# Patient Record
Sex: FEMALE | Race: BLACK OR AFRICAN AMERICAN | Employment: OTHER | URBAN - METROPOLITAN AREA
[De-identification: names, ages, dates, MRNs, and addresses within clinical notes are randomized per-mention and may not be internally consistent; named-entity substitution may affect disease eponyms.]

---

## 2017-03-21 ENCOUNTER — HOSPITAL ENCOUNTER (OUTPATIENT)
Dept: CT IMAGING | Age: 72
Discharge: HOME | End: 2017-03-21
Attending: INTERNAL MEDICINE
Payer: MEDICARE

## 2017-03-21 DIAGNOSIS — Z12.11 ENCOUNTER FOR SCREENING FOR MALIGNANT NEOPLASM OF COLON: ICD-10-CM

## 2017-03-21 PROCEDURE — 74261 CT COLONOGRAPHY DX: CPT

## 2017-04-10 ENCOUNTER — HOSPITAL ENCOUNTER (OUTPATIENT)
Dept: MAMMOGRAPHY | Age: 72
Discharge: HOME | End: 2017-04-10
Attending: NURSE PRACTITIONER
Payer: MEDICARE

## 2017-04-10 DIAGNOSIS — Z78.0 POSTMENOPAUSAL: ICD-10-CM

## 2017-04-10 PROCEDURE — 77080 DXA BONE DENSITY AXIAL: CPT

## 2017-12-08 ENCOUNTER — HOSPITAL ENCOUNTER (OUTPATIENT)
Dept: PHYSICAL THERAPY | Age: 72
Discharge: HOME | End: 2017-12-08
Attending: NURSE PRACTITIONER
Payer: MEDICARE

## 2017-12-08 DIAGNOSIS — R42 VERTIGO: ICD-10-CM

## 2017-12-08 PROCEDURE — G8979 MOBILITY GOAL STATUS: HCPCS

## 2017-12-08 PROCEDURE — 97163 PT EVAL HIGH COMPLEX 45 MIN: CPT

## 2017-12-08 PROCEDURE — G8978 MOBILITY CURRENT STATUS: HCPCS

## 2017-12-08 NOTE — PROGRESS NOTES
Ambulatory/Rehab Services H2 Model Falls Risk Assessment    Risk Factor Pts. ·   Confusion/Disorientation/Impulsivity  []    4 ·   Symptomatic Depression  []   2 ·   Altered Elimination  []   1 ·   Dizziness/Vertigo  [x]   1 ·   Gender (Male)  []   1 ·   Any administered antiepileptics (anticonvulsants):  []   2 ·   Any administered benzodiazepines:  []   1 ·   Visual Impairment (specify):  []   1 ·   Portable Oxygen Use  []   1 ·   Orthostatic ? BP  []   1 ·   History of Recent Falls (within 3 mos.)  []   5     Ability to Rise from Chair (choose one) Pts. ·   Ability to rise in a single movement  [x]   0 ·   Pushes up, successful in one attempt  []   1 ·   Multiple attempts, but successful  []   3 ·   Unable to rise without assistance  []   4   Total: (5 or greater = High Risk) 1     Falls Prevention Plan:   []                Physical Limitations to Exercise (specify):   []                Mobility Assistance Device (type):   []                Exercise/Equipment Adaptation (specify):    ©2010 [facility] [address] Patent #[id number].  Federal Law prohibits the replication, distribution or use without written permission from [facility] [employer]

## 2017-12-08 NOTE — THERAPY EVALUATION
Hien Lowe  : 1945  Primary: [place]  Secondary:  Therapy Center at [facility], [address] [address],[address], [address] [facility]  [phone]   [phone]        OUTPATIENT PHYSICAL THERAPY:Initial Assessment 2017    ICD-10: Treatment Diagnosis: Difficulty in walking, not elsewhere classified (R26.2)  Precautions/Allergies:   Sulfa (sulfonamide antibiotics)   Fall Risk Score: 1 (? 5 = High Risk)  MD Orders: Evaluate and treat MEDICAL/REFERRING DIAGNOSIS:  Vertigo [R42]   DATE OF ONSET: Around two months   REFERRING PHYSICIAN: Maryann Ball NP  RETURN PHYSICIAN APPOINTMENT: unknown      INITIAL ASSESSMENT:  Ms. Hans Hoang presents with dizziness, imbalance, and difficulty walking due to peripheral weakness. Patient is at higher fall risk based on score received on Dynamic Gait Index and multiple falls on SMART Equitest Sensory Organization test.  Patient would benefit from skilled physical therapy to address problems and goals. Thank you for this referral.     PROBLEM LIST (Impacting functional limitations):  1. Decreased Ambulation Ability/Technique  2. Decreased Balance  3. Decreased [place] with Home Exercise Program  4. Increased dizziness INTERVENTIONS PLANNED:  1. Balance Exercise  2. Gait Training  3. Home Exercise Program (HEP)  4. Habituation exercises   TREATMENT PLAN:  Effective Dates: 2017 TO 3/2/2018. Frequency/Duration: 1-2 times a week for 12 weeks  GOALS: (Goals have been discussed and agreed upon with patient.)  Short-Term Functional Goals: Time Frame: 2 weeks   1. Patient will be independent with home exercise program to improve balance. Discharge Goals: Time Frame: 12 weeks   1. Patient will increase score on Dynamic Gait Index to greater than or equal to 22/24 demonstrating improved balance and decreased fall risk with daily activities.    2. Patient will be able to maintain her balance two out of three times on conditions 5 and 6 of SMART Equitest Sensory Organization test indicating improved vestibular function. 3. Patient will report less dizziness with daily activities indicating improvement. Rehabilitation Potential For Stated Goals: Excellent  Regarding Kaylin Lowe's therapy, I certify that the treatment plan above will be carried out by a therapist or under their direction. Thank you for this referral,  Gerda Daniel PT     Referring Physician Signature: Maryann Ball NP              Date                    The information in this section was collected on 12/8/2017 (except where otherwise noted). HISTORY:   History of Present Injury/Illness (Reason for Referral):  Patient reports dizziness began around two months ago. Dizziness comes and goes. Aggravating factor are laying down and sitting up. Patient describes it as room spinning lasting a few seconds. Patient rates current dizziness as 0/10 and current pain level as 0/10. Patient uses no assistive device and has had no falls. Patient denies tinnitus. Past Medical History/Comorbidities:   Ms. Hans Hoang  has a past medical history of Arthritis; Glaucoma; Sarcoidosis; and Sarcoidosis of lung ([address] She also has no past medical history of Aneurysm ([address] Coagulation disorder ([address] Difficult intubation; Heart failure ([address] Malignant hyperthermia due to anesthesia; Morbid obesity ([address] Nausea & vomiting; Other ill-defined conditions([address]); Pseudocholinesterase deficiency; Psychiatric disorder; Unspecified adverse effect of anesthesia; or Unspecified sleep apnea. Ms. Hans Hoang  has a past surgical history that includes hysterectomy (1979); open cholecystectomy (1978); breast biopsy (Left, 1980); appendectomy; and colonoscopy (2017). Social History/Living Environment:     Lives with spouse. Prior Level of Function/Work/Activity:  Independent.   Retired   Dominant Side:         RIGHT  Personal Factors:          Sex:  female        Age:  67 y.o.  Current Medications:       Current Outpatient Prescriptions:     brimonidine-timolol (COMBIGAN) 0.2-0.5 % drop ophthalmic solution, 1 Drop every twelve (12) hours. , Disp: , Rfl:     LOTEPREDNOL ETABONATE (LOTEMAX OP), Apply  to eye., Disp: , Rfl:     PYRIDOXINE HCL, VITAMIN B6, (VITAMIN B-6 PO), Take  by mouth., Disp: , Rfl:     cholecalciferol, VITAMIN D3, (VITAMIN D3) 5,000 unit tab tablet, Take 1 Tab by mouth daily. , Disp: 90 Tab, Rfl: 3    multivitamin (ONE A DAY) tablet, Take 1 Tab by mouth daily. , Disp: , Rfl:    Date Last Reviewed:  12/8/2017   Number of Personal Factors/Comorbidities that affect the Plan of Care: 3+: HIGH COMPLEXITY   EXAMINATION:   Observation/Orthostatic Postural Assessment: Forward head/rounded shoulders posture   Functional Mobility:         Gait/Ambulation:  Patient ambulates with normal gait pattern except walking with head turns and walking with pivot turns. Postural Control & Balance:  · Hernandez Balance Scale:  Not tested. · Dynamic Gait Index:  19/24.   (A score less than or equal to19/24 is abnormal and predictive of falls)     · [employer] Sensory Organization Test:  Objective findings indicate Normal use of somatosensory, Normal use of visual, & Decreased use of vestibular inputs to balance. Center of gravity is shifted to the left. See scanned report. Patient is unable to maintain her balance on conditions 5 and 6 of this test.    Oculomotor Exam:  · Eye Range of Motion:  full range of motion  · Cervical Range of Motion:   full range of motion  · Spontaneous nystagmus:  NO  · Gaze holding nystagmus:  NO  · Smooth Pursuit:      []Smooth Eye Movements    []Delayed Eye Movements     [x]Within Normal Limits     []Other(comment): · Voluntary Saccades:      []Smooth Eye Movements    []Delayed Eye Movements     [x]Within Normal Limits     []Other(comment):   Vestibular Ocular Reflex Testing:  · Dynamic Visual Acuity Test:  Not tested.   · Head Thrust Test: Negative to the right. Negative to the left. Position Tests:  · Hallpike-Chon testing presented as negative indicating that Benign Paroxysmal Positional Vertigo (BPPV) is absent bilaterally. Body Structures Involved:  1. Nerves  2. Eyes and Ears  3. Muscles Body Functions Affected:  1. Neuromusculoskeletal Activities and Participation Affected:  1. General Tasks and Demands  2. Mobility  3. Community, Social and [place]   Number of elements (examined above) that affect the Plan of Care: 4+: HIGH COMPLEXITY   CLINICAL PRESENTATION:   Presentation: Evolving clinical presentation with unstable and unpredictable characteristics: HIGH COMPLEXITY   CLINICAL DECISION MAKING:   Outcome Measure: Tool Used: Dynamic Gait Index  Score:  Initial: 19/24 Most Recent: X/24 (Date: -- )   Interpretation of Score: Each section is scored on a 0-3 scale, 0 representing the patients inability to perform the task and 3 representing independence. The scores of each section are added together for a total score of 24. Any score below 19 indicates increased risk for falls. Score 24 23-19 18-15 14-10 9-5 4-1 0   Modifier CH CI CJ CK CL CM CN     ? Mobility - Walking and Moving Around:     - CURRENT STATUS: CI - 1%-19% impaired, limited or restricted    - GOAL STATUS: CI - 1%-19% impaired, limited or restricted    - D/C STATUS:  ---------------To be determined---------------  Medical Necessity:   · Patient is expected to demonstrate progress in balance and dizziness to improve safety during activities of daily living. Reason for Services/Other Comments:  · Patient continues to require skilled intervention due to higher fall risk with daily activities.    Use of outcome tool(s) and clinical judgement create a POC that gives a: Difficult prediction of patient's progress: HIGH COMPLEXITY            TREATMENT:   (In addition to Assessment/Re-Assessment sessions the following treatments were rendered)  Pre-treatment Symptoms/Complaints:  Imbalance, vertigo, and difficulty walking  Pain: Initial: Pain Intensity 1: 0  Post Session:  0     Initial Evaluation:  60 minutes  Patient given home exercise program consisting of habituation exercises. [employer]  Treatment/Session Assessment:    · Response to Treatment:  Tolerated without issues. · Compliance with Program/Exercises: Will assess as treatment progresses. · Recommendations/Intent for next treatment session: \"Next visit will focus on advancements to more challenging activities\".   Total Treatment Duration:  PT Patient Time In/Time Out  Time In: 0800  Time Out: 0900    Oswaldo Worley PT

## 2017-12-12 ENCOUNTER — HOSPITAL ENCOUNTER (OUTPATIENT)
Dept: MAMMOGRAPHY | Age: 72
Discharge: HOME | End: 2017-12-12
Attending: NURSE PRACTITIONER
Payer: MEDICARE

## 2017-12-12 DIAGNOSIS — Z12.31 ENCOUNTER FOR SCREENING MAMMOGRAM FOR BREAST CANCER: ICD-10-CM

## 2017-12-12 PROCEDURE — 77067 SCR MAMMO BI INCL CAD: CPT

## 2017-12-15 ENCOUNTER — HOSPITAL ENCOUNTER (OUTPATIENT)
Dept: PHYSICAL THERAPY | Age: 72
Discharge: HOME | End: 2017-12-15
Attending: NURSE PRACTITIONER
Payer: MEDICARE

## 2017-12-15 PROCEDURE — 97112 NEUROMUSCULAR REEDUCATION: CPT

## 2017-12-15 NOTE — PROGRESS NOTES
Oracio Lowe  : 1945  Primary: [place]  Secondary:  Therapy Center at [address] [place] [address],[address], [address] [facility]  [phone]   [phone]        OUTPATIENT PHYSICAL THERAPY:Daily Note 12/15/2017    ICD-10: Treatment Diagnosis: Difficulty in walking, not elsewhere classified (R26.2)  Precautions/Allergies:   Sulfa (sulfonamide antibiotics)   Fall Risk Score: 1 (? 5 = High Risk)  MD Orders: Evaluate and treat MEDICAL/REFERRING DIAGNOSIS:  Dizziness and giddiness [R42]   DATE OF ONSET: Around two months   REFERRING PHYSICIAN: Bruno Dykes NP  RETURN PHYSICIAN APPOINTMENT: unknown      INITIAL ASSESSMENT:  Ms. Makeda Willard presents with dizziness, imbalance, and difficulty walking due to peripheral weakness. Patient is at higher fall risk based on score received on Dynamic Gait Index and multiple falls on SMART Equitest Sensory Organization test.  Patient would benefit from skilled physical therapy to address problems and goals. Thank you for this referral.     PROBLEM LIST (Impacting functional limitations):  1. Decreased Ambulation Ability/Technique  2. Decreased Balance  3. Decreased [place] with Home Exercise Program  4. Increased dizziness INTERVENTIONS PLANNED:  1. Balance Exercise  2. Gait Training  3. Home Exercise Program (HEP)  4. Habituation exercises   TREATMENT PLAN:  Effective Dates: 2017 TO 3/2/2018. Frequency/Duration: 1-2 times a week for 12 weeks  GOALS: (Goals have been discussed and agreed upon with patient.)  Short-Term Functional Goals: Time Frame: 2 weeks   1. Patient will be independent with home exercise program to improve balance. Discharge Goals: Time Frame: 12 weeks   1. Patient will increase score on Dynamic Gait Index to greater than or equal to 22/24 demonstrating improved balance and decreased fall risk with daily activities.    2. Patient will be able to maintain her balance two out of three times on conditions 5 and 6 of SMART Equitest Sensory Organization test indicating improved vestibular function. 3. Patient will report less dizziness with daily activities indicating improvement. Rehabilitation Potential For Stated Goals: Excellent  Regarding Kaylin Lowe's therapy, I certify that the treatment plan above will be carried out by a therapist or under their direction. Thank you for this referral,  Joel Hoffman PT     Referring Physician Signature: Len Gonzalez NP              Date                    The information in this section was collected on 12/8/2017 (except where otherwise noted). HISTORY:   History of Present Injury/Illness (Reason for Referral):  Patient reports dizziness began around two months ago. Dizziness comes and goes. Aggravating factor are laying down and sitting up. Patient describes it as room spinning lasting a few seconds. Patient rates current dizziness as 0/10 and current pain level as 0/10. Patient uses no assistive device and has had no falls. Patient denies tinnitus. Past Medical History/Comorbidities:   Ms. Brittany Moreland  has a past medical history of Arthritis; Glaucoma; Sarcoidosis; and Sarcoidosis of lung ([address] She also has no past medical history of Aneurysm ([address] Coagulation disorder ([address] Difficult intubation; Heart failure ([address] Malignant hyperthermia due to anesthesia; Morbid obesity ([address] Nausea & vomiting; Other ill-defined conditions([address]); Pseudocholinesterase deficiency; Psychiatric disorder; Unspecified adverse effect of anesthesia; or Unspecified sleep apnea. Ms. Brittany Moreland  has a past surgical history that includes hysterectomy (1979); open cholecystectomy (1978); breast biopsy (Left, 1980); appendectomy; and colonoscopy (2017). Social History/Living Environment:     Lives with spouse. Prior Level of Function/Work/Activity:  Independent.   Retired   Dominant Side:         RIGHT  Personal Factors:          Sex:  female        Age:  67 y.o.  Current Medications:       Current Outpatient Prescriptions:     brimonidine-timolol (COMBIGAN) 0.2-0.5 % drop ophthalmic solution, 1 Drop every twelve (12) hours. , Disp: , Rfl:     LOTEPREDNOL ETABONATE (LOTEMAX OP), Apply  to eye., Disp: , Rfl:     PYRIDOXINE HCL, VITAMIN B6, (VITAMIN B-6 PO), Take  by mouth., Disp: , Rfl:     cholecalciferol, VITAMIN D3, (VITAMIN D3) 5,000 unit tab tablet, Take 1 Tab by mouth daily. , Disp: 90 Tab, Rfl: 3    multivitamin (ONE A DAY) tablet, Take 1 Tab by mouth daily. , Disp: , Rfl:    Date Last Reviewed:  12/15/2017   Number of Personal Factors/Comorbidities that affect the Plan of Care: 3+: HIGH COMPLEXITY   EXAMINATION:   Observation/Orthostatic Postural Assessment: Forward head/rounded shoulders posture   Functional Mobility:         Gait/Ambulation:  Patient ambulates with normal gait pattern except walking with head turns and walking with pivot turns. Postural Control & Balance:  · Hernandez Balance Scale:  Not tested. · Dynamic Gait Index:  19/24.   (A score less than or equal to19/24 is abnormal and predictive of falls)     · [employer] Sensory Organization Test:  Objective findings indicate Normal use of somatosensory, Normal use of visual, & Decreased use of vestibular inputs to balance. Center of gravity is shifted to the left. See scanned report. Patient is unable to maintain her balance on conditions 5 and 6 of this test.    Oculomotor Exam:  · Eye Range of Motion:  full range of motion  · Cervical Range of Motion:   full range of motion  · Spontaneous nystagmus:  NO  · Gaze holding nystagmus:  NO  · Smooth Pursuit:      []Smooth Eye Movements    []Delayed Eye Movements     [x]Within Normal Limits     []Other(comment): · Voluntary Saccades:      []Smooth Eye Movements    []Delayed Eye Movements     [x]Within Normal Limits     []Other(comment):   Vestibular Ocular Reflex Testing:  · Dynamic Visual Acuity Test:  Not tested.   · Head Thrust Test: Negative to the right. Negative to the left. Position Tests:  · Hallpike-Chon testing presented as negative indicating that Benign Paroxysmal Positional Vertigo (BPPV) is absent bilaterally. Body Structures Involved:  1. Nerves  2. Eyes and Ears  3. Muscles Body Functions Affected:  1. Neuromusculoskeletal Activities and Participation Affected:  1. General Tasks and Demands  2. Mobility  3. Community, Social and [place]   Number of elements (examined above) that affect the Plan of Care: 4+: HIGH COMPLEXITY   CLINICAL PRESENTATION:   Presentation: Evolving clinical presentation with unstable and unpredictable characteristics: HIGH COMPLEXITY   CLINICAL DECISION MAKING:   Outcome Measure: Tool Used: Dynamic Gait Index  Score:  Initial: 19/24 Most Recent: X/24 (Date: -- )   Interpretation of Score: Each section is scored on a 0-3 scale, 0 representing the patients inability to perform the task and 3 representing independence. The scores of each section are added together for a total score of 24. Any score below 19 indicates increased risk for falls. Score 24 23-19 18-15 14-10 9-5 4-1 0   Modifier CH CI CJ CK CL CM CN     ? Mobility - Walking and Moving Around:     - CURRENT STATUS: CI - 1%-19% impaired, limited or restricted    - GOAL STATUS: CI - 1%-19% impaired, limited or restricted    - D/C STATUS:  ---------------To be determined---------------  Medical Necessity:   · Patient is expected to demonstrate progress in balance and dizziness to improve safety during activities of daily living. Reason for Services/Other Comments:  · Patient continues to require skilled intervention due to higher fall risk with daily activities.    Use of outcome tool(s) and clinical judgement create a POC that gives a: Difficult prediction of patient's progress: HIGH COMPLEXITY            TREATMENT:   (In addition to Assessment/Re-Assessment sessions the following treatments were rendered)  Pre-treatment Symptoms/Complaints:  Dizziness 0/10. Feeling good. \"I want to cancel next week's appointment and then come the week after\". Pain: Initial: Pain Intensity 1: 0  Post Session:  0     NEUROMUSCULAR RE-EDUCATION: (40 minutes):  Exercise/activities per grid below to improve balance and dizziness. Required minimal verbal cues to promote dynamic balance in standing. Balance/Vestibular Treatment:   Activity   Date  12/15/18 Date Date Date Date Date   Activity/Exercise   Sets/reps/equipment Sets/reps/  equipment Sets/reps/  equipment Sets/reps/  equipment Sets/reps/  equipment Sets/reps/  equipment   Walking with head turns     4 laps        Walking with head up & down     4 laps        Step ups             Step taps             Marching           Sidestepping           Crossovers           Chunky           Walking  backwards             Tandem walking           Weaving in/out of cones     4 laps        Picking up cones             Sports cord             [employer] ball           Figure 8s    3 reps x 2        Circles right/left   3 reps x 2        Walking with 360 degree turns   2 circles x 2 reps        Spirals   2 reps        Weight shifting:    Left & Right     Tiltboard        Weight shifting: Forward & Backward      Tiltboard        Static Standing Balance     Tiltboard/sanddune eyes open with head turns; eyes closed        Standing with feet apart             Standing with feet together             Standing with feet semitandem           Standing with feet tandem           Single leg stance           X1/X2 Viewing exercises             Hallpike-Chon testing for BPPV (Benign Paroxysmal Positional Vertigo)             Carver-Daroff exercises           Canalith Repositioning treatment/Epley Maneuver  for BPPV (Benign Paroxysmal Positional Vertigo)           Smart Equitest Training: See scanned report. Patient given home exercise program consisting of habituation exercises. [employer]  Treatment/Session Assessment:    · Response to Treatment:  Patient reports no dizziness with exercises. Continue to progress to tolerance. · Compliance with Program/Exercises: Compliant. · Recommendations/Intent for next treatment session: \"Next visit will focus on advancements to more challenging activities\".   Total Treatment Duration:  PT Patient Time In/Time Out  Time In: 0805  Time Out: Ed Bryant, PT

## 2017-12-22 ENCOUNTER — APPOINTMENT (OUTPATIENT)
Dept: PHYSICAL THERAPY | Age: 72
End: 2017-12-22
Attending: NURSE PRACTITIONER
Payer: MEDICARE

## 2017-12-28 ENCOUNTER — HOSPITAL ENCOUNTER (OUTPATIENT)
Dept: PHYSICAL THERAPY | Age: 72
Discharge: HOME | End: 2017-12-28
Attending: NURSE PRACTITIONER
Payer: MEDICARE

## 2017-12-28 NOTE — PROGRESS NOTES
West Lowe  : 1945  Primary: [place]  Secondary:  [facility] at [facility], [address] [place] [address],[address], [facility].  [phone]   [phone]     OUTPATIENT DAILY NOTE    NAME/AGE/GENDER: Arleen Dodson is a 67 y.o. female. DATE: 2017    Patient cancelled for appointment today due to car trouble. Will plan to follow up on next scheduled visit.     David Rousseau, PT

## 2018-01-04 ENCOUNTER — HOSPITAL ENCOUNTER (OUTPATIENT)
Dept: PHYSICAL THERAPY | Age: 73
Discharge: HOME | End: 2018-01-04
Attending: NURSE PRACTITIONER
Payer: MEDICARE

## 2018-01-04 PROCEDURE — G8980 MOBILITY D/C STATUS: HCPCS

## 2018-01-04 PROCEDURE — 97112 NEUROMUSCULAR REEDUCATION: CPT

## 2018-01-04 PROCEDURE — G8979 MOBILITY GOAL STATUS: HCPCS

## 2018-01-04 NOTE — THERAPY DISCHARGE
Mary Lowe  : 1945  Primary: [employer] [place] [employer] [place] [employer]m*  Secondary:  [facility] [place] [id number] [facility], [address] [place] [address],[address] [facility], [place] [address].  [phone]   [phone]        OUTPATIENT PHYSICAL [address] Note and Discharge 2018    ICD-10: Treatment Diagnosis: Difficulty in walking, not elsewhere classified (R26.2)  Precautions/Allergies:   Sulfa (sulfonamide antibiotics)   Fall Risk Score: 1 (? 5 = High Risk)  MD Orders: Evaluate and treat MEDICAL/REFERRING DIAGNOSIS:  Dizziness and giddiness [R42]   DATE OF ONSET: Around two months   REFERRING PHYSICIAN: Franko Monreal NP  RETURN PHYSICIAN APPOINTMENT: unknown      INITIAL ASSESSMENT:  Ms. Jenna Crawford presents with dizziness, imbalance, and difficulty walking due to peripheral weakness. Patient is at higher fall risk based on score received on Dynamic Gait Index and multiple falls on SMART Equitest Sensory Organization test.  Patient would benefit from skilled physical therapy to address problems and goals. Thank you for this referral.    Discharge note on 2018:  Patient attended three scheduled physical therapy appointments from 2017 to 2018. Patient cancelled one appointment due to illness. Patient is very compliant with home exercise program.  Patient reports having no dizziness with daily activities. Patient discharged due to meeting all of her goals. Thank you for this referral.     PROBLEM LIST (Impacting functional limitations):  1. Decreased Ambulation Ability/Technique  2. Decreased Balance  3. Decreased [place] with Home Exercise Program  4. Increased dizziness INTERVENTIONS PLANNED:  1. Balance Exercise  2. Gait Training  3. Home Exercise Program (HEP)  4. Habituation exercises   TREATMENT PLAN:  Effective Dates: 2017 TO 3/2/2018. Frequency/Duration: 1-2 times a week for 12 weeks. Patient discharged due to meeting all of her goals.     GOALS: (Goals have been discussed and agreed upon with patient.)  Short-Term Functional Goals: Time Frame: 2 weeks   1. Patient will be independent with home exercise program to improve balance. Goal met. Discharge Goals: Time Frame: 12 weeks   1. Patient will increase score on Dynamic Gait Index to greater than or equal to 22/24 demonstrating improved balance and decreased fall risk with daily activities. Goal met. 2. Patient will be able to maintain her balance two out of three times on conditions 5 and 6 of SMART Equitest Sensory Organization test indicating improved vestibular function. Goal met. 3. Patient will report less dizziness with daily activities indicating improvement. Goal met. Rehabilitation Potential For Stated Goals: Excellent  Regarding Kaylin Lowe's therapy, I certify that the treatment plan above will be carried out by a therapist or under their direction. Thank you for this referral,  Jose M Beebe PT     Referring Physician Signature: Lyla Mcleod NP              Date                    The information in this section was collected on 12/8/2017 (except where otherwise noted). HISTORY:   History of Present Injury/Illness (Reason for Referral):  Patient reports dizziness began around two months ago. Dizziness comes and goes. Aggravating factor are laying down and sitting up. Patient describes it as room spinning lasting a few seconds. Patient rates current dizziness as 0/10 and current pain level as 0/10. Patient uses no assistive device and has had no falls. Patient denies tinnitus. Past Medical History/Comorbidities:   Ms. Mayra Pink  has a past medical history of Arthritis; Glaucoma; Sarcoidosis; and Sarcoidosis of lung ([address] She also has no past medical history of Aneurysm ([address] Coagulation disorder ([address] Difficult intubation; Heart failure ([address] Malignant hyperthermia due to anesthesia; Morbid obesity ([address] Nausea & vomiting; Other ill-defined conditions(799.89);  Pseudocholinesterase deficiency; Psychiatric disorder; Unspecified adverse effect of anesthesia; or Unspecified sleep apnea. Ms. Lyndell Blizzard  has a past surgical history that includes hx hysterectomy (1979); hx open cholecystectomy (1978); hx breast biopsy (Left, 1980); hx appendectomy; and hx colonoscopy (2017). Social History/Living Environment:     Lives with spouse. Prior Level of Function/Work/Activity:  Independent. Retired   Dominant Side:         RIGHT  Personal Factors:          Sex:  female        Age:  67 y.o. Current Medications:       Current Outpatient Prescriptions:     brimonidine-timolol (COMBIGAN) 0.2-0.5 % drop ophthalmic solution, 1 Drop every twelve (12) hours. , Disp: , Rfl:     LOTEPREDNOL ETABONATE (LOTEMAX OP), Apply  to eye., Disp: , Rfl:     PYRIDOXINE HCL, VITAMIN B6, (VITAMIN B-6 PO), Take  by mouth., Disp: , Rfl:     cholecalciferol, VITAMIN D3, (VITAMIN D3) 5,000 unit tab tablet, Take 1 Tab by mouth daily. , Disp: 90 Tab, Rfl: 3    multivitamin (ONE A DAY) tablet, Take 1 Tab by mouth daily. , Disp: , Rfl:    Date Last Reviewed:  1/4/2018   Number of Personal Factors/Comorbidities that affect the Plan of Care: 3+: HIGH COMPLEXITY   EXAMINATION:   Observation/Orthostatic Postural Assessment: Forward head/rounded shoulders posture   Functional Mobility:         Gait/Ambulation:  Patient ambulates with normal gait pattern. Postural Control & Balance:  · Hernandez Balance Scale:  Not tested. · Dynamic Gait Index:  23/24.   (A score less than or equal to19/24 is abnormal and predictive of falls). Score improved from 19/24 on initial evaluation. · [employer] Sensory Organization Test:  Objective findings indicate Normal use of somatosensory, Normal use of visual, & Decreased use of vestibular inputs to balance. Center of gravity is shifted to the left. See scanned report. Overall, score has improved. Vestibular inputs have increased compared to initial evaluation.       Oculomotor Exam:  · Eye Range of Motion:  full range of motion  · Cervical Range of Motion:   full range of motion  · Spontaneous nystagmus:  NO  · Gaze holding nystagmus:  NO  · Smooth Pursuit:      []Smooth Eye Movements    []Delayed Eye Movements     [x]Within Normal Limits     []Other(comment): · Voluntary Saccades:      []Smooth Eye Movements    []Delayed Eye Movements     [x]Within Normal Limits     []Other(comment):   Vestibular Ocular Reflex Testing:  · Dynamic Visual Acuity Test:  Not tested. · Head Thrust Test: Negative to the right. Negative to the left. Position Tests:  · Hallpike[place] testing presented as negative indicating that Benign Paroxysmal Positional Vertigo (BPPV) is absent bilaterally. Body Structures Involved:  1. Nerves  2. Eyes and Ears  3. Muscles Body Functions Affected:  1. Neuromusculoskeletal Activities and Participation Affected:  1. General Tasks and Demands  2. Mobility  3. Community, Social and [place]   Number of elements (examined above) that affect the Plan of Care: 4+: HIGH COMPLEXITY   CLINICAL PRESENTATION:   Presentation: Evolving clinical presentation with unstable and unpredictable characteristics: HIGH COMPLEXITY   CLINICAL DECISION MAKING:   Outcome Measure: Tool Used: Dynamic Gait Index  Score:  Initial: 19/24 Most Recent: 23/24 (Date: 1-4-2018 )   Interpretation of Score: Each section is scored on a 0-3 scale, 0 representing the patients inability to perform the task and 3 representing independence. The scores of each section are added together for a total score of 24. Any score below 19 indicates increased risk for falls. Score 24 23-19 18-15 14-10 9-5 4-1 0   Modifier CH CI CJ CK CL CM CN     ?  Mobility - Walking and Moving Around:     - CURRENT STATUS: CI - 1%-19% impaired, limited or restricted    - GOAL STATUS: CI - 1%-19% impaired, limited or restricted    - D/C STATUS:  CI - 1%-19% impaired, limited or restricted  ·    Use of outcome tool(s) and clinical judgement create a POC that gives a: Difficult prediction of patient's progress: HIGH COMPLEXITY            TREATMENT:   (In addition to Assessment/Re-Assessment sessions the following treatments were rendered)  Pre-treatment Symptoms/Complaints:  Dizziness 0/10. Doing well  Pain: Initial: Pain Intensity 1: 0  Post Session:  0     NEUROMUSCULAR RE-EDUCATION: (30 minutes):  Exercise/activities per grid below to improve balance and dizziness. Required minimal verbal cues to promote dynamic balance in standing. Balance/Vestibular Treatment:   Activity   Date  12/15/17 Date  1/4/18 Date Date Date Date   Activity/Exercise   Sets/reps/equipment Sets/reps/  equipment Sets/reps/  equipment Sets/reps/  equipment Sets/reps/  equipment Sets/reps/  equipment   Walking with head turns     4 laps 4 laps       Walking with head up & down     4 laps 4 laps       Walking with head diagonals      4 laps       Step taps             Marching           Sidestepping           Crossovers           [place]           Walking  backwards             Tandem walking           Weaving in/out of cones     4 laps        Picking up cones             Sports cord             [employer] ball           Figure 8s    3 reps x 2 4 reps x 2       Circles right/left   3 reps x 2 4   reps x 2       Walking with 360 degree turns   2 circles x 2 reps 3 circles x 2 reps       Spirals   2 reps        Weight shifting:    Left & Right     Tiltboard        Weight shifting:   Forward & Backward      Tiltboard        Static Standing Balance     Tiltboard/sanddune eyes open with head turns; eyes closed        Standing with feet apart             Standing with feet together             Standing with feet semitandem           Standing with feet tandem           Single leg stance           X1/X2 Viewing exercises             Hallpike-[place] testing for BPPV (Benign Paroxysmal Positional Vertigo)             Zackary medley Canalith Repositioning treatment/Epley Maneuver  for BPPV (Benign Paroxysmal Positional Vertigo)           Smart Equitest Training: See scanned report. Patient given home exercise program consisting of habituation exercises. [employer]  Treatment/Session Assessment:    · Response to Treatment:  Patient reports no dizziness with exercises. · Compliance with Program/Exercises: Compliant. · Recommendations/Intent for next treatment session: Patient discharged from physical therapy due to meeting all of her goals.   Total Treatment Duration:  PT Patient Time In/Time Out  Time In: 0815  Time Out: Alesha Huff PT

## 2018-01-05 ENCOUNTER — HOSPITAL ENCOUNTER (OUTPATIENT)
Dept: PHYSICAL THERAPY | Age: 73
End: 2018-01-05
Attending: NURSE PRACTITIONER
Payer: MEDICARE

## 2018-12-13 ENCOUNTER — HOSPITAL ENCOUNTER (OUTPATIENT)
Dept: MAMMOGRAPHY | Age: 73
Discharge: HOME | End: 2018-12-13
Attending: NURSE PRACTITIONER
Payer: MEDICARE

## 2018-12-13 DIAGNOSIS — Z12.39 SCREENING BREAST EXAMINATION: ICD-10-CM

## 2018-12-13 PROCEDURE — 77067 SCR MAMMO BI INCL CAD: CPT

## 2018-12-27 PROBLEM — E11.9 CONTROLLED TYPE 2 DIABETES MELLITUS WITHOUT COMPLICATION, WITHOUT LONG-TERM CURRENT USE OF INSULIN (HCC): Status: ACTIVE | Noted: 2018-12-27

## 2018-12-27 PROBLEM — J84.9 INTERSTITIAL LUNG DISEASE (HCC): Status: ACTIVE | Noted: 2018-12-27

## 2019-01-10 ENCOUNTER — HOSPITAL ENCOUNTER (OUTPATIENT)
Dept: GENERAL RADIOLOGY | Age: 74
Discharge: HOME | End: 2019-01-10
Payer: MEDICARE

## 2019-01-10 DIAGNOSIS — D86.9 SARCOIDOSIS: ICD-10-CM

## 2019-01-10 PROBLEM — J96.11 CHRONIC RESPIRATORY FAILURE WITH HYPOXIA (HCC): Status: ACTIVE | Noted: 2019-01-10

## 2019-01-10 PROBLEM — Z22.39 PSEUDOMONAS AERUGINOSA COLONIZATION: Chronic | Status: ACTIVE | Noted: 2019-01-10

## 2019-01-10 PROBLEM — I27.20 PULMONARY HYPERTENSION (HCC): Status: ACTIVE | Noted: 2019-01-10

## 2019-01-10 PROBLEM — J47.9 BRONCHIECTASIS WITHOUT COMPLICATION (HCC): Status: ACTIVE | Noted: 2019-01-10

## 2019-01-10 PROCEDURE — 71046 X-RAY EXAM CHEST 2 VIEWS: CPT

## 2019-12-23 ENCOUNTER — HOSPITAL ENCOUNTER (OUTPATIENT)
Dept: MAMMOGRAPHY | Age: 74
Discharge: HOME | End: 2019-12-23
Attending: NURSE PRACTITIONER
Payer: MEDICARE

## 2019-12-23 DIAGNOSIS — Z12.31 VISIT FOR SCREENING MAMMOGRAM: ICD-10-CM

## 2019-12-23 PROCEDURE — 77067 SCR MAMMO BI INCL CAD: CPT

## 2020-11-27 ENCOUNTER — TRANSCRIBE ORDER (OUTPATIENT)
Dept: SCHEDULING | Age: 75
End: 2020-11-27

## 2020-11-27 DIAGNOSIS — Z12.31 VISIT FOR SCREENING MAMMOGRAM: Primary | ICD-10-CM

## 2020-12-23 ENCOUNTER — HOSPITAL ENCOUNTER (OUTPATIENT)
Dept: GENERAL RADIOLOGY | Age: 75
Discharge: HOME | End: 2020-12-23
Attending: INTERNAL MEDICINE | Admitting: INTERNAL MEDICINE
Payer: MEDICARE

## 2020-12-23 VITALS
BODY MASS INDEX: 25.69 KG/M2 | DIASTOLIC BLOOD PRESSURE: 80 MMHG | WEIGHT: 145 LBS | HEIGHT: 63 IN | TEMPERATURE: 98 F | HEART RATE: 81 BPM | SYSTOLIC BLOOD PRESSURE: 156 MMHG | OXYGEN SATURATION: 96 % | RESPIRATION RATE: 16 BRPM

## 2020-12-23 DIAGNOSIS — R04.2 HEMOPTYSIS: ICD-10-CM

## 2020-12-23 DIAGNOSIS — J47.9 BRONCHIECTASIS WITHOUT COMPLICATION (HCC): ICD-10-CM

## 2020-12-23 PROCEDURE — 31622 DX BRONCHOSCOPE/WASH: CPT | Performed by: INTERNAL MEDICINE

## 2020-12-23 PROCEDURE — 2709999900 HC NON-CHARGEABLE SUPPLY: Performed by: INTERNAL MEDICINE

## 2020-12-23 PROCEDURE — 77030012699 HC VLV SUC CNTRL OCOA -A: Performed by: INTERNAL MEDICINE

## 2020-12-23 PROCEDURE — 99152 MOD SED SAME PHYS/QHP 5/>YRS: CPT | Performed by: INTERNAL MEDICINE

## 2020-12-23 PROCEDURE — 74011000250 HC RX REV CODE- 250: Performed by: INTERNAL MEDICINE

## 2020-12-23 PROCEDURE — 74011250636 HC RX REV CODE- 250/636: Performed by: INTERNAL MEDICINE

## 2020-12-23 PROCEDURE — 76040000019: Performed by: INTERNAL MEDICINE

## 2020-12-23 PROCEDURE — 71046 X-RAY EXAM CHEST 2 VIEWS: CPT

## 2020-12-23 RX ORDER — MIDAZOLAM HYDROCHLORIDE 1 MG/ML
.25-5 INJECTION, SOLUTION INTRAMUSCULAR; INTRAVENOUS
Status: DISCONTINUED | OUTPATIENT
Start: 2020-12-23 | End: 2020-12-23 | Stop reason: HOSPADM

## 2020-12-23 RX ORDER — FENTANYL CITRATE 50 UG/ML
50 INJECTION, SOLUTION INTRAMUSCULAR; INTRAVENOUS
Status: DISCONTINUED | OUTPATIENT
Start: 2020-12-23 | End: 2020-12-23 | Stop reason: HOSPADM

## 2020-12-23 RX ORDER — DIPHENHYDRAMINE HYDROCHLORIDE 50 MG/ML
25 INJECTION, SOLUTION INTRAMUSCULAR; INTRAVENOUS ONCE
Status: DISCONTINUED | OUTPATIENT
Start: 2020-12-23 | End: 2020-12-23 | Stop reason: HOSPADM

## 2020-12-23 RX ORDER — SODIUM CHLORIDE 0.9 % (FLUSH) 0.9 %
5-40 SYRINGE (ML) INJECTION AS NEEDED
Status: DISCONTINUED | OUTPATIENT
Start: 2020-12-23 | End: 2020-12-23 | Stop reason: HOSPADM

## 2020-12-23 RX ORDER — NALOXONE HYDROCHLORIDE 0.4 MG/ML
0.4 INJECTION, SOLUTION INTRAMUSCULAR; INTRAVENOUS; SUBCUTANEOUS
Status: DISCONTINUED | OUTPATIENT
Start: 2020-12-23 | End: 2020-12-23 | Stop reason: HOSPADM

## 2020-12-23 RX ORDER — FLUMAZENIL 0.1 MG/ML
0.2 INJECTION INTRAVENOUS
Status: DISCONTINUED | OUTPATIENT
Start: 2020-12-23 | End: 2020-12-23 | Stop reason: HOSPADM

## 2020-12-23 RX ORDER — LIDOCAINE HYDROCHLORIDE 20 MG/ML
JELLY TOPICAL ONCE
Status: COMPLETED | OUTPATIENT
Start: 2020-12-23 | End: 2020-12-23

## 2020-12-23 RX ORDER — LIDOCAINE HYDROCHLORIDE 40 MG/ML
SOLUTION TOPICAL ONCE
Status: DISCONTINUED | OUTPATIENT
Start: 2020-12-23 | End: 2020-12-23 | Stop reason: HOSPADM

## 2020-12-23 RX ORDER — SODIUM CHLORIDE 0.9 % (FLUSH) 0.9 %
5-40 SYRINGE (ML) INJECTION EVERY 8 HOURS
Status: DISCONTINUED | OUTPATIENT
Start: 2020-12-23 | End: 2020-12-23 | Stop reason: HOSPADM

## 2020-12-23 RX ADMIN — LIDOCAINE HYDROCHLORIDE: 20 JELLY TOPICAL at 15:42

## 2020-12-23 RX ADMIN — MIDAZOLAM 2 MG: 1 INJECTION INTRAMUSCULAR; INTRAVENOUS at 15:40

## 2020-12-23 RX ADMIN — FENTANYL CITRATE 50 MCG: 50 INJECTION, SOLUTION INTRAMUSCULAR; INTRAVENOUS at 15:45

## 2020-12-23 RX ADMIN — FENTANYL CITRATE 50 MCG: 50 INJECTION, SOLUTION INTRAMUSCULAR; INTRAVENOUS at 15:40

## 2020-12-23 NOTE — PROCEDURES
PROCEDURE    Bronchoscopy with airway inspection    INDICATION   Hemoptysis in background of fibrocystic sarcoidosis and bronchiectasis    EQUIPMENT:  Olympus T 180 Bronchhoscope. ANESTHESIA    Concious sedation with: Fentanyl 100 mcg IV; Versed 2mg IV; Lidocaine 180 mg to tracheo-bronchial tree and vocal cordsAIRWAY INSPECTION    After obtaining informed consent, using a bite block/ET tube adapter, an Olympus T 180 video bronchoscope was  introduced into the trachea through the vocal chords, without complication. RIGHT    LOCATION NORM/ABNORM DESCRIPTION   VOCAL CORDS NL    TRACHEA NL    ROBERTO CARLOS NL    RMSB NL    RUL NL    BI NL    RML NL    SUP SEGM RLL NL    MED BASAL NL    ANTERIOR BASAL NL    LATERAL BASAL NL    POSTERIOR BASAL NL                                              LEFT    LOCATION NORMAL/ABNORMAL TYPE   LMSB NL    DONAL NL    LINGULA NL Clot in orifice, no active bleeding either spontaneous or with provocation with suction and saline flushes   SUPERIOR DIVISION NL    SUPERIOR SEG LLL NL    GEREMIAS-MEDIAL LLL NL    LATERAL LLL NL    POSTERIOR LLL NL      The following samples were obtained:    The procedure was completed  without complication and the patient tolerated the procedure well. EBL: none    Recommendations:  Repeat bronchoscopy as needed for recurrent symptoms and if active bleeding noted- proceed with IR bronchial artery embolization  Cough suppression for now.     Manuela Boxer, MD

## 2020-12-23 NOTE — ROUTINE PROCESS
VSS. No complaints noted. Education given and reviewed with . Pt wheeled out to car via wheelchair by [address] [facility].

## 2020-12-23 NOTE — INTERVAL H&P NOTE
Update History & Physical    Date of Surgery Update:  Annabel Kingston was seen and examined. History and physical has been reviewed. The patient has been examined.  There have been no significant clinical changes since the completion of the originally dated History and Physical.    Signed By: Ginny Giordano MD     December 23, 2020 3:40 PM

## 2020-12-23 NOTE — H&P (VIEW-ONLY)
[address] [facility]. [facility], [address] [facility] 
[phone] Patient Name:  Aminata Hope YOB: 1945 Office Visit 12/23/2020 Chief Complaint Patient presents with  Hemoptysis HISTORY OF PRESENT ILLNESS:   
This is a pleasant lady who I am seeing for the first time with her . 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 She is usually followed by Dr. Oh Herndon. Please note she has basically been on a combination of albuterol inhaler in order to help clear secretions. Patient has noticed that she started having hemoptysis recently that was rather significant and she even brought a sample with her today. It is fresh red blood that is at least 3 cc. She indicated that she has been coughing up half a cup of blood per day recently. She is quite concerned about it. She indicates she does not take any aspirin or any other blood thinners. She indicates she takes turmeric which they told her in the past as a natural blood thinner. Currently denies any shortness of breath, wheezing, fever, chills, night sweats or hemoptysis. She has not been taking any steroids. She is not on antibiotics. She is wondering what to do. She denies any epistaxis or recent chest wall trauma DIAGNOSTIC TESTS: 
 CT of chest: None CXR:  12/23/20chronic diffuse changes similar to prior x-ray from a year ago. Spirometry: None Exercise oximetry: None Past Medical History:  
Diagnosis Date  Arthritis   
 osteo  Glaucoma  Sarcoidosis  Sarcoidosis of lung ([facility] Problem List  Date Reviewed: 12/23/2020 Codes Class Noted Bronchiectasis without complication ([facility] TJU-08-IA: J47.9 ICD-9-CM: 494.0  1/10/2019 Pulmonary hypertension (HCC) ICD-10-CM: I27.20 ICD-9-CM: 416.8  1/10/2019 Chronic respiratory failure with hypoxia [facility]) ICD-10-CM: J96.11 
ICD-9-CM: 518.83, 799.02  1/10/2019 Pseudomonas aeruginosa colonization (Chronic) ICD-10-CM: Z22.39 ICD-9-CM: V02.59  1/10/2019 Controlled type 2 diabetes mellitus without complication, without long-term current use of insulin ([facility] ICD-10-CM: E11.9 ICD-9-CM: 250.00  12/27/2018 Interstitial lung disease ([facility] ICD-10-CM: J84.9 ICD-9-CM: 709  12/27/2018 Sarcoidosis of lung ([facility] ICD-10-CM: D86.0 ICD-9-CM: 135, 517.8  12/20/2016 Hypercholesteremia ICD-10-CM: E78.00 ICD-9-CM: 272.0  12/20/2016 Past Surgical History:  
Procedure Laterality Date  HX APPENDECTOMY [address]  HX COLONOSCOPY  2017 [address] [place] [address] [address] Social History Socioeconomic History  Marital status:  Spouse name: Not on file  Number of children: Not on file  Years of education: Not on file  Highest education level: Not on file Occupational History  Not on file Social Needs  Financial resource strain: Not on file  Food insecurity Worry: Not on file Inability: Not on file  Transportation needs Medical: Not on file Non-medical: Not on file Tobacco Use  Smoking status: Never Smoker  Smokeless tobacco: Never Used Substance and Sexual Activity  Alcohol use: Yes Comment: occassional wine  Drug use: No  
 Sexual activity: Not on file Lifestyle  Physical activity Days per week: Not on file Minutes per session: Not on file  Stress: Not on file Relationships  Social connections Talks on phone: Not on file Gets together: Not on file Attends [employer] service: Not on file Active member of club or organization: Not on file Attends meetings of clubs or organizations: Not on file Relationship status: Not on file  Intimate partner violence Fear of current or ex partner: Not on file Emotionally abused: Not on file Physically abused: Not on file Forced sexual activity: Not on file Other Topics Concern  Not on file Social History Narrative  Not on file Family History Problem Relation Age of Onset  Breast Cancer Mother 79  
 Cancer Mother  Hypertension Mother  Diabetes Mother  High Cholesterol Mother  Elevated Lipids Father  Hypertension Sister  High Cholesterol Sister  Diabetes Maternal Grandmother  Diabetes Maternal Grandfather  Hypertension Sister Allergies Allergen Reactions  Sulfa (Sulfonamide Antibiotics) Itching Current Outpatient Medications Medication Sig  
 HYDROcodone-homatropine (HYCODAN) 5-1.5 mg/5 mL (5 mL) oral solution Take 5 mL by mouth four (4) times daily as needed for Cough (hemoptysis) for up to 30 days. Max Daily Amount: 20 mL.  cholecalciferol, vitamin D3, (Vitamin D3) 50 mcg (2,000 unit) tab Take 1 Tab by mouth daily.  albuterol (PROVENTIL VENTOLIN) 2.5 mg /3 mL (0.083 %) nebu Take 3 mL by inhalation every four (4) hours. Dx D86.9  
 turmeric/turmeric ext/pepr ext (TURMERIC-TURMERIC EXT-PEPPER) 500-3 mg cap Take  by mouth.  calcium-cholecalciferol, d3, (CALCIUM 600 + D) 600-125 mg-unit tab Take  by mouth.  OXYGEN-AIR DELIVERY SYSTEMS 2 L by Nasal route nightly.  pyridoxine, vitamin B6, (VITAMIN B-6) 100 mg tablet Take 100 mg by mouth.  Coenzyme Q10 400 mg cap Take 400 mg by mouth.  OTHER 500 mg daily. Indications: Tumeric  brimonidine-timolol (COMBIGAN) 0.2-0.5 % drop ophthalmic solution 1 Drop every twelve (12) hours.  multivitamin (ONE A DAY) tablet Take 1 Tab by mouth daily.  LOTEPREDNOL ETABONATE (LOTEMAX OP) Apply  to eye. No current facility-administered medications for this visit. Review of Systems Constitutional: Negative for chills, diaphoresis, fever, malaise/fatigue and weight loss. Respiratory: Positive for cough, hemoptysis and wheezing. Negative for sputum production and shortness of breath. Cardiovascular: Positive for chest pain. Negative for palpitations, claudication, leg swelling and PND. Gastrointestinal: Negative for abdominal pain, constipation, diarrhea, heartburn, nausea and vomiting. Neurological: Negative for dizziness, tremors, seizures, weakness and headaches. PHYSICAL EXAM: 
 
Visit Vitals /80 Pulse 84 Temp 98 °F (36.7 °C) (Temporal) Resp 20 Ht 5' 3\" (1.6 m) Wt 145 lb (65.8 kg) SpO2 91% BMI 25.69 kg/m² Constitutional:  the patient is well developed and in no acute distress EENMT:  Sclera clear, pupils equal, oral mucosa moist, no blood in nares or posterior pharynx Respiratory: Mild crackles bilaterally. No overt rhonchi. No wheezing Cardiovascular:  RRR without M,G,R 
Gastrointestinal: soft and non-tender; with positive bowel sounds. Musculoskeletal: warm without cyanosis. There is no lower leg edema. Skin:  no jaundice or rashes, no wounds Neurologic: no gross neuro deficits Psychiatric:  alert and oriented x 3 
 
 
 
ASSESSMENT:  (Medical Decision Making) ICD-10-CM ICD-9-CM 1. Hemoptysis  R04.2 786.30 HYDROcodone-homatropine (HYCODAN) 5-1.5 mg/5 mL (5 mL) oral solution 2. Sarcoidosis  D86.9 135 3. Bronchiectasis without complication ([facility]  A91.4 494.0   
4. Interstitial lung disease ([facility]  J84.9 515   
5. Cough  R05 786.2 HYDROcodone-homatropine (HYCODAN) 5-1.5 mg/5 mL (5 mL) oral solution PLAN: 
 
 --Patient currently with moderate her marker for but usually can become massive if patient keeps on bleeding. Trying to coordinate with bronchoscopy suite if we need to do a bronchoscopy now to localize if she does have any area of bleeding. Please note she has been n.p.o. If not able to do so may need to admit patient. If hemoptysis persists may even need interventional radiology for bronchial artery embolization. Further recommendations pending discussion with bronchoscopy sleep and Dr. Rell Willams shortly. --will give hycodan as a cough suppressant 
--check CBC/INR 
--may need CT chest 
 
I was able to get in touch with Dr. Noemy Finney. Agrees patient will need bronchoscopy for localization of bleeding. If this is more significant, will likely need embolization by interventional radiology. Staff is now arranging for the patient to get the bronchoscopy done today. I conveyed this to the patient and her  today. Did go to the [employer]. Patient is using schedule II medications appropriately with no evidence of abuse/misuse. New medications ordered appropriately Nallely Smalls MD 
 
Over 50% of today's office visit was spent in face to face time reviewing test results, prognosis, importance of compliance, education about disease process, benefits of medications, instructions for management of acute flare-ups, and follow up plans. Total face to face time spent with patient was 40 minutes. Electronically signed and dictated. Please note if there are errors this is  likely a result of the dictation software. Orders Placed This Encounter  HYDROcodone-homatropine (HYCODAN) 5-1.5 mg/5 mL (5 mL) oral solution Sig: Take 5 mL by mouth four (4) times daily as needed for Cough (hemoptysis) for up to 30 days. Max Daily Amount: 20 mL. Dispense:  240 mL   Refill:  0

## 2020-12-23 NOTE — DISCHARGE INSTRUCTIONS
RESPIRATORY CARE - BRONCHOSCOPY - DISCHARGE INSTRUCTIONS      You received a lot of numbing medication for your throat and nose, and you also received medication to make you sleepy during your procedure. Because of this and because the bronchoscopy may have irritated your airways, we ask that you follow these directions:    1. Do not eat or drink until  5:00 PM .   After that, you may have what you please. You may want to try some liquids first, because your throat may be a little sore. 2. Medication may cause drowsiness for several hours, therefore:  · Do not drive or operate machinery for remainder of the day. · No alcohol today  · Do not make any important or legal decisions for 24 hours  · Do not sign any legal documents for 24 hours    3. You may cough up more mucus than usual and you may see some blood, but                   this is expected and should subside by the following day. 4. If severe throat irritation, coughing, or bleeding continue, call your doctor. 5.         If you run a fever greater than 102, call [facility] at [phone]. 6.         Dr. Faby Cardona has asked that you:                A. Call the doctor's office at [phone] for follow up appointment ***. Discharge Medications:  {Medication reconciliation information is now added to the patient's AVS automatically when it is printed. There is no need to use this SmartLink in discharge instructions.   Highlight this text and delete it to clear this message}

## 2021-01-05 ENCOUNTER — HOSPITAL ENCOUNTER (OUTPATIENT)
Dept: CT IMAGING | Age: 76
Discharge: HOME | End: 2021-01-05
Attending: NURSE PRACTITIONER
Payer: MEDICARE

## 2021-01-05 DIAGNOSIS — R04.2 HEMOPTYSIS: ICD-10-CM

## 2021-01-05 PROCEDURE — 71250 CT THORAX DX C-: CPT

## 2021-01-06 ENCOUNTER — HOSPITAL ENCOUNTER (OUTPATIENT)
Dept: INTERVENTIONAL RADIOLOGY/VASCULAR | Age: 76
Discharge: HOME | End: 2021-01-06
Attending: INTERNAL MEDICINE
Payer: MEDICARE

## 2021-01-06 VITALS
DIASTOLIC BLOOD PRESSURE: 70 MMHG | TEMPERATURE: 99.9 F | SYSTOLIC BLOOD PRESSURE: 120 MMHG | HEART RATE: 103 BPM | WEIGHT: 139 LBS | BODY MASS INDEX: 24.63 KG/M2 | RESPIRATION RATE: 16 BRPM | OXYGEN SATURATION: 100 % | HEIGHT: 63 IN

## 2021-01-06 DIAGNOSIS — R04.2 HEMOPTYSIS: ICD-10-CM

## 2021-01-06 LAB — GLUCOSE BLD STRIP.AUTO-MCNC: 146 MG/DL (ref 65–100)

## 2021-01-06 PROCEDURE — 37244 VASC EMBOLIZE/OCCLUDE BLEED: CPT

## 2021-01-06 PROCEDURE — C1894 INTRO/SHEATH, NON-LASER: HCPCS

## 2021-01-06 PROCEDURE — 74011000250 HC RX REV CODE- 250: Performed by: RADIOLOGY

## 2021-01-06 PROCEDURE — 74011250636 HC RX REV CODE- 250/636: Performed by: RADIOLOGY

## 2021-01-06 PROCEDURE — 36245 INS CATH ABD/L-EXT ART 1ST: CPT

## 2021-01-06 PROCEDURE — C1769 GUIDE WIRE: HCPCS

## 2021-01-06 PROCEDURE — 77030004524 HC CATH ANGI DX FLS COOK -B

## 2021-01-06 PROCEDURE — 82962 GLUCOSE BLOOD TEST: CPT

## 2021-01-06 PROCEDURE — 99153 MOD SED SAME PHYS/QHP EA: CPT

## 2021-01-06 PROCEDURE — 77030016064 HC PARTIC EMBSPHR BSPH -D

## 2021-01-06 PROCEDURE — C1887 CATHETER, GUIDING: HCPCS

## 2021-01-06 PROCEDURE — 74011000636 HC RX REV CODE- 636: Performed by: RADIOLOGY

## 2021-01-06 PROCEDURE — 75605 CONTRAST EXAM THORACIC AORTA: CPT

## 2021-01-06 PROCEDURE — 77030010507 HC ADH SKN DERMBND J&J -B

## 2021-01-06 PROCEDURE — 77030004521 HC CATH ANGI DX COOK -B

## 2021-01-06 PROCEDURE — C1760 CLOSURE DEV, VASC: HCPCS

## 2021-01-06 PROCEDURE — 99152 MOD SED SAME PHYS/QHP 5/>YRS: CPT

## 2021-01-06 RX ORDER — SODIUM CHLORIDE 9 MG/ML
25 INJECTION, SOLUTION INTRAVENOUS CONTINUOUS
Status: DISCONTINUED | OUTPATIENT
Start: 2021-01-06 | End: 2021-01-10 | Stop reason: HOSPADM

## 2021-01-06 RX ORDER — SODIUM CHLORIDE 9 MG/ML
50 INJECTION, SOLUTION INTRAVENOUS CONTINUOUS
Status: DISCONTINUED | OUTPATIENT
Start: 2021-01-06 | End: 2021-01-10 | Stop reason: HOSPADM

## 2021-01-06 RX ORDER — FENTANYL CITRATE 50 UG/ML
25-100 INJECTION, SOLUTION INTRAMUSCULAR; INTRAVENOUS
Status: DISCONTINUED | OUTPATIENT
Start: 2021-01-06 | End: 2021-01-10 | Stop reason: HOSPADM

## 2021-01-06 RX ORDER — HEPARIN SODIUM 200 [USP'U]/100ML
20-40 INJECTION, SOLUTION INTRAVENOUS
Status: DISCONTINUED | OUTPATIENT
Start: 2021-01-06 | End: 2021-01-10 | Stop reason: HOSPADM

## 2021-01-06 RX ORDER — MIDAZOLAM HYDROCHLORIDE 1 MG/ML
.5-2 INJECTION, SOLUTION INTRAMUSCULAR; INTRAVENOUS
Status: DISCONTINUED | OUTPATIENT
Start: 2021-01-06 | End: 2021-01-10 | Stop reason: HOSPADM

## 2021-01-06 RX ORDER — LIDOCAINE HYDROCHLORIDE 20 MG/ML
1-10 INJECTION, SOLUTION INFILTRATION; PERINEURAL ONCE
Status: COMPLETED | OUTPATIENT
Start: 2021-01-06 | End: 2021-01-06

## 2021-01-06 RX ADMIN — HEPARIN SODIUM 20 UNITS/HR: 200 INJECTION, SOLUTION INTRAVENOUS at 10:42

## 2021-01-06 RX ADMIN — FENTANYL CITRATE 25 MCG: 50 INJECTION, SOLUTION INTRAMUSCULAR; INTRAVENOUS at 10:50

## 2021-01-06 RX ADMIN — MIDAZOLAM 0.5 MG: 1 INJECTION INTRAMUSCULAR; INTRAVENOUS at 11:06

## 2021-01-06 RX ADMIN — IOPAMIDOL 210 ML: 612 INJECTION, SOLUTION INTRAVENOUS at 11:43

## 2021-01-06 RX ADMIN — HEPARIN SODIUM 20 UNITS/HR: 200 INJECTION, SOLUTION INTRAVENOUS at 10:40

## 2021-01-06 RX ADMIN — MIDAZOLAM 1 MG: 1 INJECTION INTRAMUSCULAR; INTRAVENOUS at 10:29

## 2021-01-06 RX ADMIN — MIDAZOLAM 0.5 MG: 1 INJECTION INTRAMUSCULAR; INTRAVENOUS at 11:13

## 2021-01-06 RX ADMIN — FENTANYL CITRATE 25 MCG: 50 INJECTION, SOLUTION INTRAMUSCULAR; INTRAVENOUS at 11:06

## 2021-01-06 RX ADMIN — LIDOCAINE HYDROCHLORIDE 7 ML: 20 INJECTION, SOLUTION INFILTRATION; PERINEURAL at 10:46

## 2021-01-06 RX ADMIN — MIDAZOLAM 0.5 MG: 1 INJECTION INTRAMUSCULAR; INTRAVENOUS at 10:50

## 2021-01-06 RX ADMIN — HEPARIN SODIUM 20 UNITS/HR: 200 INJECTION, SOLUTION INTRAVENOUS at 10:41

## 2021-01-06 RX ADMIN — HEPARIN SODIUM 20 UNITS/HR: 200 INJECTION, SOLUTION INTRAVENOUS at 10:43

## 2021-01-06 RX ADMIN — FENTANYL CITRATE 25 MCG: 50 INJECTION, SOLUTION INTRAMUSCULAR; INTRAVENOUS at 11:24

## 2021-01-06 RX ADMIN — FENTANYL CITRATE 50 MCG: 50 INJECTION, SOLUTION INTRAMUSCULAR; INTRAVENOUS at 10:29

## 2021-01-06 RX ADMIN — SODIUM CHLORIDE 25 ML/HR: 900 INJECTION, SOLUTION INTRAVENOUS at 10:37

## 2021-01-06 NOTE — H&P
History and Physical    Patient: Tommy Diaz MRN: [id number]  SSN: [id number]    YOB: 1945  Age: 76 y.o. Sex: female      Subjective:      Tommy Diaz is a 76 y.o. female who has pulmonary fibrosis and persistent hemoptysis. Recent bronchoscopy showing source is from lingula. Presents for JOAQUIN.  NPO. Past Medical History:   Diagnosis Date    Arthritis     osteo    Glaucoma     Sarcoidosis     Sarcoidosis of lung ([address]      Past Surgical History:   Procedure Laterality Date    HX APPENDECTOMY      HX BREAST BIOPSY Left 1980    HX COLONOSCOPY  2017    HX HYSTERECTOMY  1979    HX OPEN CHOLECYSTECTOMY  1978      Family History   Problem Relation Age of Onset    Breast Cancer Mother 79    Cancer Mother     Hypertension Mother     Diabetes Mother     High Cholesterol Mother     Elevated Lipids Father     Hypertension Sister     High Cholesterol Sister     Diabetes Maternal Grandmother     Diabetes Maternal Grandfather     Hypertension Sister      Social History     Tobacco Use    Smoking status: Never Smoker    Smokeless tobacco: Never Used   Substance Use Topics    Alcohol use: Yes     Comment: occassional wine      Prior to Admission medications    Medication Sig Start Date End Date Taking? Authorizing Provider   HYDROcodone-homatropine (HYCODAN) 5-1.5 mg/5 mL (5 mL) oral solution Take 5 mL by mouth four (4) times daily as needed for Cough (hemoptysis) for up to 30 days. Max Daily Amount: 20 mL. 12/23/20 1/22/21  Elba James MD   cholecalciferol, vitamin D3, (Vitamin D3) 50 mcg (2,000 unit) tab Take 1 Tab by mouth daily. 6/23/20   Chayito Mace NP   albuterol (PROVENTIL VENTOLIN) 2.5 mg /3 mL (0.083 %) nebu Take 3 mL by inhalation every four (4) hours. Dx D86.9 2/11/20   Jacoby Landrum MD   turmeric/turmeric ext/pepr ext (TURMERIC-TURMERIC EXT-PEPPER) 500-3 mg cap Take  by mouth.     Provider, Historical   calcium-cholecalciferol, d3, (CALCIUM 600 + D) 600-125 mg-unit tab Take  by mouth. Provider, Historical   OXYGEN-AIR DELIVERY SYSTEMS 2 L by Nasal route nightly. Provider, Historical   pyridoxine, vitamin B6, (VITAMIN B-6) 100 mg tablet Take 100 mg by mouth. Provider, Historical   Coenzyme Q10 400 mg cap Take 400 mg by mouth. Provider, Historical   OTHER 500 mg daily. Indications: Tumeric    Provider, Historical   brimonidine-timolol (COMBIGAN) 0.2-0.5 % drop ophthalmic solution 1 Drop every twelve (12) hours. Provider, Historical   LOTEPREDNOL ETABONATE (LOTEMAX OP) Apply  to eye. Provider, Historical   multivitamin (ONE A DAY) tablet Take 1 Tab by mouth daily. Provider, Historical        Allergies   Allergen Reactions    Sulfa (Sulfonamide Antibiotics) Itching       Review of Systems:  Pertinent items are noted in the History of Present Illness. Objective:     Vitals:    01/06/21 0918   BP: 110/68   Pulse: 91   Resp: 18   Temp: 99.9 °F (37.7 °C)   SpO2: 97%   Weight: 63 kg (139 lb)   Height: 5' 3\" (1.6 m)        Physical Exam:  LUNG: clear to auscultation bilaterally  HEART: murmur    Assessment:     Hospital Problems  Date Reviewed: 12/23/2020    None          Plan:     Bronchial arteriogram with possible embolization. Moderate sedation.     Signed By: Supriya Peters MD     January 6, 2021

## 2021-01-06 NOTE — DISCHARGE INSTRUCTIONS
[address]  Department of Interventional Radiology  [facility]  [phone] Office  [phone] Fax    Embolization Discharge Instructions          Home Care Instructions: You can resume your regular diet. Do not tub bathe, swim, drink alcohol, or make any important legal decisions for the next 48 hours. Do not lift anything heavier than a gallon of milk for 5 days. If you take Glucophage (metformin) for diabetes, do not take it for the next 48 hours. Follow-Up Instructions:  Please see your ordering doctor as he/she has requested. Call If:   You should call your Physician and/or the Radiology Nurse if you have any signs of infection like fever, drainage, redness, and/or swelling. If the puncture site should ooze, please call. Also call if you have any pain, decreased sensation, numbness, tingling, swelling, or change in color to the affected extremity. SEEK IMMEDIATE MEDICAL CARE IF YOUR PUNCTURE SITE STARTS TO BLEED. APPLY ENOUGH FIRM PRESSURE TO THE SITE WITH THE TIPS OF YOUR FINGERS TO STOP THE BLEEDING. Arterial bleeding is a medical emergency and should be evaluated immediately. Call your doctor immediately if you develop a fever greater than 101.5°F, shaking, chills, or dizziness. Also notify your doctor if you develop severe right upper abdominal pain or nausea/vomiting and the symptoms are not relieved by medication. To Reach Us: If you have any questions about your procedure, please call the Interventional Radiology department at [phone]. After business hours (5pm) and weekends, call the answering service at [phone] and ask for the Radiologist on call to be paged. Si tiene Preguntas acerca del procedimiento, por favor llame al departamento de Radiología Intervencional al [phone]. Después de horas de oficina (5 pm) y los fines de [place], llamar al Javed Saxena al [phone] y pregunte por el [facility]. Interventional Radiology General Nurse Discharge    After general anesthesia or intravenous sedation, for 24 hours or while taking prescription Narcotics:  · Limit your activities  · Do not drive and operate hazardous machinery  · Do not make important personal or business decisions  · Do  not drink alcoholic beverages  · If you have not urinated within 8 hours after discharge, please contact your surgeon on call. * Please give a list of your current medications to your Primary Care Provider. * Please update this list whenever your medications are discontinued, doses are     changed, or new medications (including over-the-counter products) are added. * Please carry medication information at all times in case of emergency situations. These are general instructions for a healthy lifestyle:    No smoking/ No tobacco products/ Avoid exposure to second hand smoke  Surgeon General's Warning:  Quitting smoking now greatly reduces serious risk to your health. Obesity, smoking, and sedentary lifestyle greatly increases your risk for illness  A healthy diet, regular physical exercise & weight monitoring are important for maintaining a healthy lifestyle    You may be retaining fluid if you have a history of heart failure or if you experience any of the following symptoms:  Weight gain of 3 pounds or more overnight or 5 pounds in a week, increased swelling in our hands or feet or shortness of breath while lying flat in bed. Please call your doctor as soon as you notice any of these symptoms; do not wait until your next office visit. Recognize signs and symptoms of STROKE:  F-face looks uneven    A-arms unable to move or move unevenly    S-speech slurred or non-existent    T-time-call 911 as soon as signs and symptoms begin-DO NOT go       Back to bed or wait to see if you get better-TIME IS BRAIN.

## 2021-01-06 NOTE — PROCEDURES
Department of Interventional Radiology  [phone]        Interventional Radiology Brief Procedure Note    Patient: Steve Lee MRN: [id number]  SSN: [id number]    YOB: 1945  Age: 76 y.o. Sex: female      Date of Procedure: 1/6/2021    Pre-Procedure Diagnosis: hemoptysis    Post-Procedure Diagnosis: SAME    Procedure(s): Bronchial artery embolization    Brief Description of Procedure: as above    Performed By: Meng Stanford MD     Assistants: None    Anesthesia:Moderate Sedation    Estimated Blood Loss: Less than 10ml    Specimens:  None    Implants: microspheres    Findings: hypertrophied abnormal left bronchial artery.   Successful embolization    Complications: None    Recommendations: routine post care     Follow Up: as needed    Signed By: Meng Stanford MD     January 6, 2021

## 2021-02-12 ENCOUNTER — APPOINTMENT (OUTPATIENT)
Dept: ULTRASOUND IMAGING | Age: 76
End: 2021-02-12
Payer: MEDICARE

## 2021-02-12 ENCOUNTER — HOSPITAL ENCOUNTER (EMERGENCY)
Age: 76
Discharge: HOME | End: 2021-02-12
Attending: EMERGENCY MEDICINE
Payer: MEDICARE

## 2021-02-12 VITALS
TEMPERATURE: 98 F | HEART RATE: 88 BPM | RESPIRATION RATE: 16 BRPM | OXYGEN SATURATION: 96 % | SYSTOLIC BLOOD PRESSURE: 161 MMHG | BODY MASS INDEX: 25.69 KG/M2 | HEIGHT: 63 IN | DIASTOLIC BLOOD PRESSURE: 81 MMHG | WEIGHT: 145 LBS

## 2021-02-12 DIAGNOSIS — M79.89 LEG SWELLING: Primary | ICD-10-CM

## 2021-02-12 PROCEDURE — 93971 EXTREMITY STUDY: CPT

## 2021-02-12 PROCEDURE — 99284 EMERGENCY DEPT VISIT MOD MDM: CPT

## 2021-02-12 NOTE — ED NOTES
I have reviewed discharge instructions with the patient. The patient verbalized understanding. Patient left ED via Discharge Method: ambulatory to Home with (spouse). Opportunity for questions and clarification provided. Patient given 0 scripts. To continue your aftercare when you leave the hospital, you may receive an automated call from our care team to check in on how you are doing. This is a free service and part of our promise to provide the best care and service to meet your aftercare needs.  If you have questions, or wish to unsubscribe from this service please call [phone]. Thank you for Choosing our [facility] Emergency Department.

## 2021-02-12 NOTE — DISCHARGE INSTRUCTIONS
Salt/sodium restriction  We have given you referral to cardiology for follow-up of this and some recurring mild chest issues  Ultrasound shows no evidence of deep venous thrombosis/clot in your leg

## 2021-02-12 NOTE — ED TRIAGE NOTES
Patient advises that she had IR procedure on 1/6 for bronchial arteriogram with access in right groin. Patient advises she started with swelling to right leg with worse area being her foot. Patient also complaining of some shortness of breath. Patient noted with oxygen at 79% initially and then states she was suppose to be on oxygen as needed. Patient placed on oxygen at 3 lpm via NC with increase to 96%. Patient advises complete relief of her breathing difficulty at this time. Mask on during triage.

## 2021-02-13 NOTE — ED PROVIDER NOTES
Here with some soreness to right foot. No acute injury. Had vasc eval quite some time ago . Very slight swelling to right ankle. No CP or SOB. Very slight right ankle region swelling with less on left. No trauma. No other changes. The history is provided by the patient. Post-Op Problem  The problem has not changed since onset. Pertinent negatives include no chest pain, no abdominal pain and no shortness of breath. Nothing aggravates the symptoms. Nothing relieves the symptoms.         Past Medical History:   Diagnosis Date    Arthritis     osteo    Glaucoma     Sarcoidosis     [address]osis of lung [address] [address]        Past Surgical History:   Procedure Laterality Date    HX APPENDECTOMY      HX BREAST BIOPSY Left 1980    HX COLONOSCOPY  2017    HX HYSTERECTOMY  1979    HX OPEN CHOLECYSTECTOMY  1978    IR OCCL [address]  1/6/2021         Family History:   Problem Relation Age of Onset    Breast Cancer Mother 79    Cancer Mother     Hypertension Mother     Diabetes Mother     High Cholesterol Mother     Elevated Lipids Father     Hypertension Sister     High Cholesterol Sister     Diabetes Maternal Grandmother     Diabetes Maternal Grandfather     Hypertension Sister        Social History     Socioeconomic History    Marital status:      Spouse name: Not on file    Number of children: Not on file    Years of education: Not on file    Highest education level: Not on file   Occupational History    Not on file   Social Needs    Financial resource strain: Not on file    Food insecurity     Worry: Not on file     Inability: Not on file    Transportation needs     Medical: Not on file     Non-medical: Not on file   Tobacco Use    Smoking status: Never Smoker    Smokeless tobacco: Never Used   Substance and Sexual Activity    Alcohol use: Yes     Comment: occassional wine    Drug use: No    Sexual activity: Not on file   Lifestyle    Physical activity     Days per week: Not on file     Minutes per session: Not on file    Stress: Not on file   Relationships    Social connections     Talks on phone: Not on file     Gets together: Not on file     Attends [employer] service: Not on file     Active member of club or organization: Not on file     Attends meetings of clubs or organizations: Not on file     Relationship status: Not on file    Intimate partner violence     Fear of current or ex partner: Not on file     Emotionally abused: Not on file     Physically abused: Not on file     Forced sexual activity: Not on file   Other Topics Concern    Not on file   Social History Narrative    Not on file         ALLERGIES: Sulfa (sulfonamide antibiotics)    Review of Systems   Constitutional: Negative for chills and fever. Respiratory: Negative for cough and shortness of breath. Cardiovascular: Negative for chest pain. Gastrointestinal: Negative for abdominal pain. All other systems reviewed and are negative. Vitals:    02/12/21 1518 02/12/21 1526 02/12/21 1725 02/12/21 1823   BP: (!) 174/92   (!) 161/81   Pulse: 99   88   Resp: 16   16   Temp: 97.9 °F (36.6 °C)   98 °F (36.7 °C)   SpO2: (!) 79% 96% 96% 96%   Weight: 65.8 kg (145 lb)      Height: 5' 3\" (1.6 m)               Physical Exam  Vitals signs and nursing note reviewed. Constitutional:       General: She is not in acute distress. Appearance: Normal appearance. She is well-developed and normal weight. She is not ill-appearing, toxic-appearing or diaphoretic. Comments: Please note low oxygen sats correct with correct placement and actual waveform to mid-90s on RA. HENT:      Head: Atraumatic. Eyes:      General: No scleral icterus. Neck:      Musculoskeletal: Neck supple. Cardiovascular:      Rate and Rhythm: Normal rate. Pulmonary:      Effort: Pulmonary effort is normal. No respiratory distress. Skin:     General: Skin is warm and dry. Capillary Refill: Capillary refill takes 2 to 3 seconds. Neurological:      General: No focal deficit present. Mental Status: She is alert. Mental status is at baseline. Motor: No weakness. Coordination: Coordination normal.   Psychiatric:         Thought Content: Thought content normal.          MDM  Number of Diagnoses or Management Options  Leg swelling  Diagnosis management comments: Symmetrical LE exam with slight delay capillary bilaterall but no ischemic changes/pallor/ ... Sent for ltrasound and no DVT. Exam actually quite benign at present.  Will have follow as outpatient       Amount and/or Complexity of Data Reviewed  Tests in the radiology section of CPT®: reviewed and ordered    Risk of Complications, Morbidity, and/or Mortality  Presenting problems: moderate  Diagnostic procedures: low  Management options: moderate    Patient Progress  Patient progress: stable         Procedures

## 2021-03-12 ENCOUNTER — HOSPITAL ENCOUNTER (OUTPATIENT)
Dept: LAB | Age: 76
Discharge: HOME | End: 2021-03-12
Payer: MEDICARE

## 2021-03-12 DIAGNOSIS — R06.09 DYSPNEA ON EXERTION: ICD-10-CM

## 2021-03-12 LAB
ALBUMIN SERPL-MCNC: 3.3 G/DL (ref 3.2–4.6)
ALBUMIN/GLOB SERPL: 0.6 {RATIO} (ref 1.2–3.5)
ALP SERPL-CCNC: 94 U/L (ref 50–136)
ALT SERPL-CCNC: 19 U/L (ref 12–65)
ANION GAP SERPL CALC-SCNC: 1 MMOL/L (ref 7–16)
AST SERPL-CCNC: 22 U/L (ref 15–37)
BASOPHILS # BLD: 0.1 K/UL (ref 0–0.2)
BASOPHILS NFR BLD: 1 % (ref 0–2)
BILIRUB SERPL-MCNC: 0.3 MG/DL (ref 0.2–1.1)
BNP SERPL-MCNC: 550 PG/ML
BUN SERPL-MCNC: 10 MG/DL (ref 8–23)
CALCIUM SERPL-MCNC: 10.1 MG/DL (ref 8.3–10.4)
CHLORIDE SERPL-SCNC: 98 MMOL/L (ref 98–107)
CO2 SERPL-SCNC: 39 MMOL/L (ref 21–32)
CREAT SERPL-MCNC: 0.65 MG/DL (ref 0.6–1)
DIFFERENTIAL METHOD BLD: ABNORMAL
EOSINOPHIL # BLD: 0.2 K/UL (ref 0–0.8)
EOSINOPHIL NFR BLD: 3 % (ref 0.5–7.8)
ERYTHROCYTE [DISTWIDTH] IN BLOOD BY AUTOMATED COUNT: 14.5 % (ref 11.9–14.6)
GLOBULIN SER CALC-MCNC: 5.3 G/DL (ref 2.3–3.5)
GLUCOSE SERPL-MCNC: 108 MG/DL (ref 65–100)
HCT VFR BLD AUTO: 38.6 % (ref 35.8–46.3)
HGB BLD-MCNC: 11 G/DL (ref 11.7–15.4)
IMM GRANULOCYTES # BLD AUTO: 0 K/UL (ref 0–0.5)
IMM GRANULOCYTES NFR BLD AUTO: 0 % (ref 0–5)
LYMPHOCYTES # BLD: 2.6 K/UL (ref 0.5–4.6)
LYMPHOCYTES NFR BLD: 34 % (ref 13–44)
MCH RBC QN AUTO: 24.6 PG (ref 26.1–32.9)
MCHC RBC AUTO-ENTMCNC: 28.5 G/DL (ref 31.4–35)
MCV RBC AUTO: 86.4 FL (ref 79.6–97.8)
MONOCYTES # BLD: 0.7 K/UL (ref 0.1–1.3)
MONOCYTES NFR BLD: 9 % (ref 4–12)
NEUTS SEG # BLD: 4.3 K/UL (ref 1.7–8.2)
NEUTS SEG NFR BLD: 55 % (ref 43–78)
NRBC # BLD: 0 K/UL (ref 0–0.2)
PLATELET # BLD AUTO: 305 K/UL (ref 150–450)
PMV BLD AUTO: 10.6 FL (ref 9.4–12.3)
POTASSIUM SERPL-SCNC: 4.3 MMOL/L (ref 3.5–5.1)
PROT SERPL-MCNC: 8.6 G/DL (ref 6.3–8.2)
RBC # BLD AUTO: 4.47 M/UL (ref 4.05–5.2)
SODIUM SERPL-SCNC: 138 MMOL/L (ref 136–145)
WBC # BLD AUTO: 7.9 K/UL (ref 4.3–11.1)

## 2021-03-12 PROCEDURE — 36415 COLL VENOUS BLD VENIPUNCTURE: CPT

## 2021-03-12 PROCEDURE — 85025 COMPLETE CBC W/AUTO DIFF WBC: CPT

## 2021-03-12 PROCEDURE — 80053 COMPREHEN METABOLIC PANEL: CPT

## 2021-03-12 PROCEDURE — 83880 ASSAY OF NATRIURETIC PEPTIDE: CPT

## 2021-03-15 NOTE — PROGRESS NOTES
Please call patient. LAbs OK. She was supposed to have echo and see me after. I dont see that scheduled. Did it get set up?   Thank you

## 2021-04-02 NOTE — PROGRESS NOTES
Patient pre-assessment complete for [place] [facility] scheduled for [facility], arrival time 0600. Patient verified using . Patient instructed to bring all home medications in labeled bottles on the day of procedure. NPO status reinforced. Patient informed to take a full dose aspirin 325mg  or 81 mg x 4 on the day of procedure. Patient instructed to HOLD all medications including vitamins & supplements. Patient instructed ok to take morning eyedrops. Patient verbalizes understanding of all instructions & denies any questions at this time.

## 2021-04-05 ENCOUNTER — HOSPITAL ENCOUNTER (OUTPATIENT)
Dept: CARDIAC CATH/INVASIVE PROCEDURES | Age: 76
Discharge: HOME | End: 2021-04-05
Attending: INTERNAL MEDICINE | Admitting: INTERNAL MEDICINE
Payer: MEDICARE

## 2021-04-05 VITALS
HEART RATE: 76 BPM | DIASTOLIC BLOOD PRESSURE: 73 MMHG | WEIGHT: 139 LBS | SYSTOLIC BLOOD PRESSURE: 124 MMHG | HEIGHT: 63 IN | BODY MASS INDEX: 24.63 KG/M2 | TEMPERATURE: 98.6 F | OXYGEN SATURATION: 96 %

## 2021-04-05 DIAGNOSIS — D86.0 SARCOIDOSIS OF LUNG (HCC): ICD-10-CM

## 2021-04-05 DIAGNOSIS — R06.09 DYSPNEA ON EXERTION: ICD-10-CM

## 2021-04-05 DIAGNOSIS — J84.9 INTERSTITIAL LUNG DISEASE (HCC): ICD-10-CM

## 2021-04-05 DIAGNOSIS — I27.20 PULMONARY HYPERTENSION (HCC): ICD-10-CM

## 2021-04-05 LAB
ALBUMIN SERPL-MCNC: 2.9 G/DL (ref 3.2–4.6)
ALBUMIN/GLOB SERPL: 0.6 {RATIO} (ref 1.2–3.5)
ALP SERPL-CCNC: 74 U/L (ref 50–136)
ALT SERPL-CCNC: 17 U/L (ref 12–65)
ANION GAP SERPL CALC-SCNC: 5 MMOL/L (ref 7–16)
AST SERPL-CCNC: 17 U/L (ref 15–37)
ATRIAL RATE: 67 BPM
BILIRUB SERPL-MCNC: 0.3 MG/DL (ref 0.2–1.1)
BUN SERPL-MCNC: 14 MG/DL (ref 8–23)
CALCIUM SERPL-MCNC: 9.1 MG/DL (ref 8.3–10.4)
CALCULATED P AXIS, ECG09: 49 DEGREES
CALCULATED R AXIS, ECG10: -39 DEGREES
CALCULATED T AXIS, ECG11: -34 DEGREES
CHLORIDE SERPL-SCNC: 102 MMOL/L (ref 98–107)
CO2 SERPL-SCNC: 34 MMOL/L (ref 21–32)
CREAT SERPL-MCNC: 0.69 MG/DL (ref 0.6–1)
DIAGNOSIS, 93000: NORMAL
ERYTHROCYTE [DISTWIDTH] IN BLOOD BY AUTOMATED COUNT: 15.3 % (ref 11.9–14.6)
GLOBULIN SER CALC-MCNC: 5 G/DL (ref 2.3–3.5)
GLUCOSE SERPL-MCNC: 116 MG/DL (ref 65–100)
HCT VFR BLD AUTO: 34.8 % (ref 35.8–46.3)
HGB BLD-MCNC: 10.3 G/DL (ref 11.7–15.4)
INR PPP: 1.1
MAGNESIUM SERPL-MCNC: 2 MG/DL (ref 1.8–2.4)
MCH RBC QN AUTO: 24.3 PG (ref 26.1–32.9)
MCHC RBC AUTO-ENTMCNC: 29.6 G/DL (ref 31.4–35)
MCV RBC AUTO: 82.1 FL (ref 79.6–97.8)
NRBC # BLD: 0 K/UL (ref 0–0.2)
P-R INTERVAL, ECG05: 130 MS
PLATELET # BLD AUTO: 241 K/UL (ref 150–450)
PMV BLD AUTO: 10.1 FL (ref 9.4–12.3)
POTASSIUM SERPL-SCNC: 3.9 MMOL/L (ref 3.5–5.1)
PROT SERPL-MCNC: 7.9 G/DL (ref 6.3–8.2)
PROTHROMBIN TIME: 14.3 SEC (ref 12.5–14.7)
Q-T INTERVAL, ECG07: 404 MS
QRS DURATION, ECG06: 74 MS
QTC CALCULATION (BEZET), ECG08: 426 MS
RBC # BLD AUTO: 4.24 M/UL (ref 4.05–5.2)
SODIUM SERPL-SCNC: 141 MMOL/L (ref 136–145)
VENTRICULAR RATE, ECG03: 67 BPM
WBC # BLD AUTO: 6.8 K/UL (ref 4.3–11.1)

## 2021-04-05 PROCEDURE — 93460 R&L HRT ART/VENTRICLE ANGIO: CPT

## 2021-04-05 PROCEDURE — 85027 COMPLETE CBC AUTOMATED: CPT

## 2021-04-05 PROCEDURE — 99152 MOD SED SAME PHYS/QHP 5/>YRS: CPT

## 2021-04-05 PROCEDURE — C1894 INTRO/SHEATH, NON-LASER: HCPCS

## 2021-04-05 PROCEDURE — 99153 MOD SED SAME PHYS/QHP EA: CPT

## 2021-04-05 PROCEDURE — C1751 CATH, INF, PER/CENT/MIDLINE: HCPCS

## 2021-04-05 PROCEDURE — 93463 DRUG ADMIN & HEMODYNMIC MEAS: CPT

## 2021-04-05 PROCEDURE — C1769 GUIDE WIRE: HCPCS

## 2021-04-05 PROCEDURE — 93460 R&L HRT ART/VENTRICLE ANGIO: CPT | Performed by: INTERNAL MEDICINE

## 2021-04-05 PROCEDURE — 74011000636 HC RX REV CODE- 636: Performed by: INTERNAL MEDICINE

## 2021-04-05 PROCEDURE — 77030016699 HC CATH ANGI DX INFN1 CARD -A

## 2021-04-05 PROCEDURE — 77030015766

## 2021-04-05 PROCEDURE — 77030042317 HC BND COMPR HEMSTAT -B

## 2021-04-05 PROCEDURE — 83735 ASSAY OF MAGNESIUM: CPT

## 2021-04-05 PROCEDURE — 80053 COMPREHEN METABOLIC PANEL: CPT

## 2021-04-05 PROCEDURE — 74011250636 HC RX REV CODE- 250/636: Performed by: INTERNAL MEDICINE

## 2021-04-05 PROCEDURE — 93463 DRUG ADMIN & HEMODYNMIC MEAS: CPT | Performed by: INTERNAL MEDICINE

## 2021-04-05 PROCEDURE — 93005 ELECTROCARDIOGRAM TRACING: CPT | Performed by: INTERNAL MEDICINE

## 2021-04-05 PROCEDURE — 85610 PROTHROMBIN TIME: CPT

## 2021-04-05 PROCEDURE — 74011000250 HC RX REV CODE- 250: Performed by: INTERNAL MEDICINE

## 2021-04-05 RX ORDER — ONDANSETRON 2 MG/ML
4 INJECTION INTRAMUSCULAR; INTRAVENOUS
Status: DISCONTINUED | OUTPATIENT
Start: 2021-04-05 | End: 2021-04-05 | Stop reason: HOSPADM

## 2021-04-05 RX ORDER — ACETAMINOPHEN 325 MG/1
650 TABLET ORAL
Status: DISCONTINUED | OUTPATIENT
Start: 2021-04-05 | End: 2021-04-05 | Stop reason: HOSPADM

## 2021-04-05 RX ORDER — HYDROCODONE BITARTRATE AND ACETAMINOPHEN 5; 325 MG/1; MG/1
1 TABLET ORAL
Status: DISCONTINUED | OUTPATIENT
Start: 2021-04-05 | End: 2021-04-05 | Stop reason: HOSPADM

## 2021-04-05 RX ORDER — HEPARIN SODIUM 200 [USP'U]/100ML
2 INJECTION, SOLUTION INTRAVENOUS CONTINUOUS
Status: DISCONTINUED | OUTPATIENT
Start: 2021-04-05 | End: 2021-04-05 | Stop reason: HOSPADM

## 2021-04-05 RX ORDER — LIDOCAINE HYDROCHLORIDE 10 MG/ML
1-20 INJECTION, SOLUTION EPIDURAL; INFILTRATION; INTRACAUDAL; PERINEURAL ONCE
Status: COMPLETED | OUTPATIENT
Start: 2021-04-05 | End: 2021-04-05

## 2021-04-05 RX ORDER — SODIUM CHLORIDE 0.9 % (FLUSH) 0.9 %
5-40 SYRINGE (ML) INJECTION EVERY 8 HOURS
Status: DISCONTINUED | OUTPATIENT
Start: 2021-04-05 | End: 2021-04-05 | Stop reason: HOSPADM

## 2021-04-05 RX ORDER — ADENOSINE 3 MG/ML
140 INJECTION, SOLUTION INTRAVENOUS ONCE
Status: COMPLETED | OUTPATIENT
Start: 2021-04-05 | End: 2021-04-05

## 2021-04-05 RX ORDER — SODIUM CHLORIDE 0.9 % (FLUSH) 0.9 %
5-40 SYRINGE (ML) INJECTION AS NEEDED
Status: DISCONTINUED | OUTPATIENT
Start: 2021-04-05 | End: 2021-04-05 | Stop reason: HOSPADM

## 2021-04-05 RX ORDER — FENTANYL CITRATE 50 UG/ML
25-100 INJECTION, SOLUTION INTRAMUSCULAR; INTRAVENOUS
Status: DISCONTINUED | OUTPATIENT
Start: 2021-04-05 | End: 2021-04-05 | Stop reason: HOSPADM

## 2021-04-05 RX ORDER — SODIUM CHLORIDE 9 MG/ML
75 INJECTION, SOLUTION INTRAVENOUS CONTINUOUS
Status: DISCONTINUED | OUTPATIENT
Start: 2021-04-05 | End: 2021-04-05 | Stop reason: HOSPADM

## 2021-04-05 RX ORDER — MIDAZOLAM HYDROCHLORIDE 1 MG/ML
.5-5 INJECTION, SOLUTION INTRAMUSCULAR; INTRAVENOUS
Status: DISCONTINUED | OUTPATIENT
Start: 2021-04-05 | End: 2021-04-05 | Stop reason: HOSPADM

## 2021-04-05 RX ORDER — GUAIFENESIN 100 MG/5ML
81-324 LIQUID (ML) ORAL ONCE
Status: DISCONTINUED | OUTPATIENT
Start: 2021-04-05 | End: 2021-04-05 | Stop reason: HOSPADM

## 2021-04-05 RX ADMIN — FENTANYL CITRATE 25 MCG: 50 INJECTION, SOLUTION INTRAMUSCULAR; INTRAVENOUS at 11:43

## 2021-04-05 RX ADMIN — IOPAMIDOL 50 ML: 755 INJECTION, SOLUTION INTRAVENOUS at 12:23

## 2021-04-05 RX ADMIN — HEPARIN SODIUM 2 ML: 10000 INJECTION, SOLUTION INTRAVENOUS; SUBCUTANEOUS at 11:44

## 2021-04-05 RX ADMIN — HEPARIN SODIUM 2 UNITS/HR: 5000 INJECTION, SOLUTION INTRAVENOUS; SUBCUTANEOUS at 11:44

## 2021-04-05 RX ADMIN — SODIUM CHLORIDE 75 ML/HR: 900 INJECTION, SOLUTION INTRAVENOUS at 07:12

## 2021-04-05 RX ADMIN — MIDAZOLAM 1 MG: 1 INJECTION INTRAMUSCULAR; INTRAVENOUS at 11:43

## 2021-04-05 RX ADMIN — LIDOCAINE HYDROCHLORIDE 3 ML: 10 INJECTION, SOLUTION EPIDURAL; INFILTRATION; INTRACAUDAL; PERINEURAL at 11:43

## 2021-04-05 RX ADMIN — ADENOSINE 8.83 MG/MIN: 3 INJECTION, SOLUTION INTRAVENOUS at 12:00

## 2021-04-05 NOTE — DISCHARGE INSTRUCTIONS

## 2021-04-05 NOTE — PROCEDURES
[facility]  CARDIAC CATH    Name:  Vikas Carter  MR#:  [id number]  :  1945  ACCOUNT #:  [de-identified]  DATE OF SERVICE:  2021    PROCEDURES PERFORMED:  1. Left heart catheterization, selective coronary arteriography, and left ventriculogram via the right radial approach. 2.  Right heart catheterization with hemodynamic assessment, pulse oximetry run, and thermodilution cardiac outputs. 3.  Vasodilatation challenge with intravenous adenosine with continuous assessment of right heart pressures. PREOPERATIVE DIAGNOSES:  1. Exertional dyspnea concerning for anginal equivalent in a patient with multiple cardiovascular risk factors. Cardiac catheterization recommended. 2.  Severe pulmonary hypertension noted on echocardiogram.  Assess for right heart catheterization vasoreactivity challenge recommended after discussion with the primary pulmonologist.    POSTOPERATIVE DIAGNOSES:  Severe pulmonary HTN. SURGEON:  Leandro Weiner MD    ASSISTANT:  None. ESTIMATED BLOOD LOSS:  Less than 5 mL. SPECIMENS REMOVED:  None. COMPLICATIONS:  None. IMPLANTS:  None. ANESTHESIA:  The patient received moderate supervised conscious sedation with a total of 1 mg of Versed, 25 mcg of fentanyl. Sedation start time was 11:41 a.m. with a procedure completion time of 12:26 p.m. Sedation was administered by Susanne Blancas RN, under my supervision. The patient was monitored continuously in regards to level of consciousness, hemodynamic status, and respiratory status. She remained stable. FINDINGS:  Below. TECHNIQUE:  After informed consent was obtained, the patient was brought to the cardiac catheterization lab. The right radial artery was prepped and draped in the usual sterile fashion. She had an existing right brachial IV in place. Utilizing modified Seldinger technique and micropuncture needle, the right radial artery was entered.   A 6-Georgian Terumo Slender sheath was placed without difficulty. A radial cocktail consisting of 2000 units of heparin, 2 mg of verapamil, and 200 mcg of nitroglycerin was administered. At this point, using the existing brachial IV and a micropuncture kit, this was exchanged for a 7-Urdu sheath. Left heart catheterization was then performed via the right radial sheath with a Tiger 4.0 catheter. This was used to engage the ostium of the left main coronary artery and right coronary artery, respectively. Selective injection verification performed. A pigtail catheter was used to cross the aortic valve and the left ventricle. Hemodynamic measurements and left ventriculogram were obtained. Left ventricular aortic pressure gradient was obtained by pullback technique. At the conclusion of left heart catheterization, a Hermon-Jayshree catheter was advanced up from the right brachial vein and ultimately into the wedge position. Pulse oximetry run, thermodilution cardiac output, and hemodynamic assessment were obtained. The patient then underwent intravenous adenosine administration as outlined below. At max dose of adenosine at 300 mcg/kg/minute, the patient had significant dyspnea and neck tightness. She was very uncomfortable and could not continue the procedure. The pullback gradients across the pulmonic and tricuspid valves were obtained. CONTRAST:  Isovue 50. HEMODYNAMICS:  BASELINE RIGHT HEART HEMODYNAMICS:  1. Right atrial pressure showed an A-wave of 13, V-wave of 10, and a mean pressure of 6.  2.  Right ventricular pressure was 65/6.  3.  PA pressure was 74/21. 4.  Pulmonary capillary wedge pressure showed an A-wave of 10, V-wave of 9, and a mean of 7.  5.  Thermodilution cardiac output was 3.6. Milind cardiac output was 3.7 with cardiac index 2.1.  6.  There is no significant gradient across the pulmonic or tricuspid valves. LEFT HEART HEMODYNAMICS:  1. Aortic pressure 118/71.   2.  Left ventricular end-diastolic pressure was 5.  3.  There is no significant gradient across the aortic valve. ANGIOGRAPHIC RESULTS:  1. Left main coronary artery:  Large-caliber vessel. Angiographically normal.  2.  LAD:  Large-caliber vessel, 20% mid stenosis. 3.  First diagonal artery:  Small-caliber vessel. Patent. 4.  Second diagonal artery:  Small-caliber vessel. Patent. 5.  Left circumflex is a medium-caliber nondominant vessel, 20% mid stenosis. 6.  First obtuse marginal artery:  Small-caliber vessel. Patent. 7.  Second obtuse marginal artery:  Medium-caliber vessel. Patent. 8.  Right coronary artery: It is a medium to large-caliber dominant vessel. Angiographically normal.  9.  Right posterolateral branch:  Medium-caliber vessel. Patent. 10.  Right PDA:  Medium-caliber vessel, 20% proximal stenosis. 11.  Left ventriculogram performed in MARX projection shows normal left ventricular systolic function. EF 70% to 75%. The LV appears hyperdynamic. No mitral regurgitation. The aortic root is not dilated. VASOREACTIVITY CHALLENGE:  1.  At 50 mcg/kg/minute of intravenous adenosine, systemic blood pressure 131/71. PA pressure is 74/23. Thermodilution cardiac output 4.0 and heart rate 64.  2.  At 100 mcg/kg/minute of adenosine, systemic blood pressure 124/71, heart rate of 73. PA pressure 75/21 with a cardiac output of 4.2.  3.  At 150 mcg, systemic blood pressure is 137/77 with a heart rate of 68. PA pressure 66/18 with a cardiac output of 3.9.  4.  At 200 mcg/kg/minute showed systemic blood pressure 147/76 with a heart rate of 80. The PA pressure is 73/18. Cardiac output 4.5.  5.  At 250 mcg/kg/minute, systemic blood pressure 150/77 with a heart rate of 80. PA pressure 73/19 with a cardiac output of 4.8.  6.  At 300 mcg/kg/minute shows a systemic blood pressure of 146/77 with a heart rate of 81. PA pressure 72/18 and a cardiac output 4.6. CONCLUSIONS:  1.  Mild nonobstructive coronary artery disease.   2.  Normal left ventricular systolic function. 3.  Normal filling pressures with left ventricular end-diastolic pressure of 5 and mean pulmonary catheter wedge pressure of 7.  4.  Severe pulmonary hypertension with no evidence of vasoreactivity with the intravenous adenosine challenge. PLAN:  Follow up with Pulmonology for options in regards to treatment.       Kerry Briseno MD      [id number]  D:  04/05/2021 12:35  T:  04/05/2021 13:18  JOB #:  [id number]

## 2021-04-05 NOTE — PROGRESS NOTES
Report received from Jw Isaacs Ray Procedural findings communicated. Intra procedural  medication administration reviewed. Progression of care discussed.      Patient received into [address] [facility] 2 post sheath removal.     right Radial access site without bleeding or swelling     TR band dry and intact     Patient instructed to limit movement to right upper extremity    Routine post procedural vital signs and site assessment initiated

## 2021-04-05 NOTE — PROGRESS NOTES
TRANSFER - OUT REPORT:  R/LHC by Dr. Rowan Bhatia  Right radial and brachial access  No interventions   Adenosine run from 2430-6937  Right wrist TR band with 12ml  Right brachial 7FR sheath covered with tegaderm  Versed 1mg IV  Fentanyl 25mcg IV  Access site soft, clean, dry, and intact; with no signs of bleeding or hematoma  Pt. Tolerated procedure    Verbal report given to Carolyn(name) tiff Snyder  being transferred to CPRU(unit) for routine progression of care       Report consisted of patients Situation, Background, Assessment and   Recommendations(SBAR). Information from the following report(s) Procedure Summary and MAR was reviewed with the receiving nurse. Lines:       Opportunity for questions and clarification was provided.       Patient transported with:   [employer]

## 2021-04-05 NOTE — PROGRESS NOTES
7FR sheath removed from right femoral vein at 1248. Manual pressure held for 12 minutes until hemostasis achieved. No bleeding or hematoma noted afterwards. Sterile dressing placed. Pt instructed to keep right arm straight and to remain flat. Pt verbalized understanding of post procedural instructions. Call bell within reach. Will continue to monitor. Hemostasis achieved at 1300.

## 2021-04-05 NOTE — PROCEDURES
Brief Cardiac Procedure Note    Patient: Leatha Owens MRN: [id number]  SSN: [id number]    YOB: 1945  Age: 76 y.o. Sex: female      Date of Procedure: 4/5/2021     Pre-procedure Diagnosis: Shortness of Breath    Post-procedure Diagnosis: Pulmonary HTN    Procedure: Right and Left Heart Catheterization    Brief Description of Procedure: As above    Performed By: Nancy Gomez MD     Assistants: None    Anesthesia: Moderate Sedation    Estimated Blood Loss: Less than 10 mL      Specimens: None    Implants: None    Findings: Mild CAD. Normal LV function. Severe Pulmonary HTN. Complications: None    Recommendations: Continue medical therapy.     Signed By: Nancy Gomez MD     April 5, 2021

## 2021-04-05 NOTE — PROGRESS NOTES
Report received from Jw Isaacs Ray Procedural findings communicated. Intra procedural  medication administration reviewed. Progression of care discussed.      Patient received into [id number] [facility] 2 post sheath removal.     Right Radial access site without bleeding or swelling     TR band dry and intact     Patient instructed to limit movement to right upper extremity    Routine post procedural vital signs and site assessment initiated

## 2021-04-05 NOTE — PROGRESS NOTES
Pt arrived, ambulated to room with no visible problems, planned [facility] for Dr Harry Fernandez. Consent signed, Procedure discussed with pt all questions answered voiced understanding. Medications and history discussed with pt. Pt prepped per ordersThe patient has a fraility score of 3-MANAGING WELL, based on ability to complete ADls without assistance.       Patient took Aspirin 324mg today at 0500 prior to arrival.

## 2021-04-27 ENCOUNTER — HOSPITAL ENCOUNTER (OUTPATIENT)
Dept: LAB | Age: 76
Discharge: HOME | End: 2021-04-27
Payer: MEDICARE

## 2021-04-27 DIAGNOSIS — I27.20 PULMONARY HYPERTENSION (HCC): ICD-10-CM

## 2021-04-27 LAB
ALBUMIN SERPL-MCNC: 3.3 G/DL (ref 3.2–4.6)
ALBUMIN/GLOB SERPL: 0.6 {RATIO} (ref 1.2–3.5)
ALP SERPL-CCNC: 81 U/L (ref 50–136)
ALT SERPL-CCNC: 17 U/L (ref 12–65)
AST SERPL-CCNC: 14 U/L (ref 15–37)
BILIRUB DIRECT SERPL-MCNC: <0.1 MG/DL
BILIRUB SERPL-MCNC: 0.3 MG/DL (ref 0.2–1.1)
GLOBULIN SER CALC-MCNC: 5.2 G/DL (ref 2.3–3.5)
HCV AB SER QL: NONREACTIVE
HIV 1+2 AB+HIV1 P24 AG SERPL QL IA: NONREACTIVE
HIV12 RESULT COMMENT, HHIVC: ABNORMAL
PROT SERPL-MCNC: 8.5 G/DL (ref 6.3–8.2)

## 2021-04-27 PROCEDURE — 87389 HIV-1 AG W/HIV-1&-2 AB AG IA: CPT

## 2021-04-27 PROCEDURE — 86235 NUCLEAR ANTIGEN ANTIBODY: CPT

## 2021-04-27 PROCEDURE — 36415 COLL VENOUS BLD VENIPUNCTURE: CPT

## 2021-04-27 PROCEDURE — 86803 HEPATITIS C AB TEST: CPT

## 2021-04-27 PROCEDURE — 86038 ANTINUCLEAR ANTIBODIES: CPT

## 2021-04-27 PROCEDURE — 80076 HEPATIC FUNCTION PANEL: CPT

## 2021-04-28 LAB
ANA SER QL: POSITIVE
CENTROMERE B AB SER-ACNC: <0.2 AI (ref 0–0.9)
CHROMATIN AB SERPL-ACNC: <0.2 AI (ref 0–0.9)
DSDNA AB SER-ACNC: <1 IU/ML (ref 0–9)
ENA JO1 AB SER-ACNC: <0.2 AI (ref 0–0.9)
ENA RNP AB SER-ACNC: 3.7 AI (ref 0–0.9)
ENA SCL70 AB SER-ACNC: <0.2 AI (ref 0–0.9)
ENA SM AB SER-ACNC: <0.2 AI (ref 0–0.9)
ENA SS-A AB SER-ACNC: <0.2 AI (ref 0–0.9)
ENA SS-B AB SER-ACNC: <0.2 AI (ref 0–0.9)
SEE BELOW, 164869: ABNORMAL

## 2021-05-06 ENCOUNTER — HOSPITAL ENCOUNTER (OUTPATIENT)
Dept: GENERAL RADIOLOGY | Age: 76
Discharge: HOME | End: 2021-05-06
Attending: INTERNAL MEDICINE
Payer: MEDICARE

## 2021-05-06 ENCOUNTER — HOSPITAL ENCOUNTER (OUTPATIENT)
Dept: NUCLEAR MEDICINE | Age: 76
Discharge: HOME | End: 2021-05-06
Attending: INTERNAL MEDICINE
Payer: MEDICARE

## 2021-05-06 DIAGNOSIS — I27.20 PULMONARY HYPERTENSION (HCC): ICD-10-CM

## 2021-05-06 PROCEDURE — 71046 X-RAY EXAM CHEST 2 VIEWS: CPT

## 2021-05-06 PROCEDURE — 77030032008 NM LUNG VENT/PERF

## 2021-05-13 ENCOUNTER — HOSPITAL ENCOUNTER (OUTPATIENT)
Dept: SLEEP MEDICINE | Age: 76
Discharge: HOME | End: 2021-05-13
Payer: MEDICARE

## 2021-05-13 PROCEDURE — 95806 SLEEP STUDY UNATT&RESP EFFT: CPT

## 2021-05-28 ENCOUNTER — HOSPITAL ENCOUNTER (OUTPATIENT)
Dept: CARDIAC REHAB | Age: 76
Discharge: HOME | End: 2021-05-28

## 2021-05-28 NOTE — CARDIO/PULMONARY
May 28 , 2021      Dear Dr. Duncan Fitting: Thank you for referring your patient, Fern Cortes (: 1945), to the Pulmonary Rehabilitation Program at [facility]. Mrs. Lowe is a good candidate for the program and should see improvements with regular participation. We will be working to increase your patient's endurance and self care over the next 12 weeks. We will contact you with any issues or concerns that may arise, or you can follow your patient's progress through [address] [place] [address] at any time. We will send you a final summary report when the program is completed. Again, thank you for your referral. If we can be of further assistance, please feel free to contact the Cardiopulmonary Rehab staff at [phone].     Sincerely,    Gladys Baca, RRT, RCP  Pulmonary Rehab Specialist   [facility]    CC: File

## 2021-06-04 ENCOUNTER — HOSPITAL ENCOUNTER (OUTPATIENT)
Dept: LAB | Age: 76
Discharge: HOME | End: 2021-06-04
Payer: MEDICARE

## 2021-06-04 DIAGNOSIS — J47.1 BRONCHIECTASIS WITH (ACUTE) EXACERBATION (HCC): ICD-10-CM

## 2021-06-04 PROCEDURE — 87070 CULTURE OTHR SPECIMN AEROBIC: CPT

## 2021-06-04 PROCEDURE — 87206 SMEAR FLUORESCENT/ACID STAI: CPT

## 2021-06-04 PROCEDURE — 87116 MYCOBACTERIA CULTURE: CPT

## 2021-06-09 ENCOUNTER — HOSPITAL ENCOUNTER (OUTPATIENT)
Dept: LAB | Age: 76
Discharge: HOME | End: 2021-06-09
Payer: MEDICARE

## 2021-06-09 PROCEDURE — 87102 FUNGUS ISOLATION CULTURE: CPT

## 2021-06-09 PROCEDURE — 87106 FUNGI IDENTIFICATION YEAST: CPT

## 2021-06-10 ENCOUNTER — HOSPITAL ENCOUNTER (OUTPATIENT)
Dept: CARDIAC REHAB | Age: 76
Discharge: HOME | End: 2021-06-10
Payer: MEDICARE

## 2021-06-10 VITALS — BODY MASS INDEX: 24.34 KG/M2 | HEIGHT: 63 IN | WEIGHT: 137.4 LBS

## 2021-06-10 PROCEDURE — G0239 OTH RESP PROC, GROUP: HCPCS

## 2021-06-13 LAB
ANTIMICROBIAL SUSCEPTIBILITY, 080569: ABNORMAL
ANTIMICROBIAL SUSCEPTIBILITY, 080575: ABNORMAL
BACTERIA ISLT: ABNORMAL
BACTERIA SPEC CULT: ABNORMAL
GRAM STN SPEC: ABNORMAL
RESULT 1, 080565: ABNORMAL
RESULT 1, 080571: ABNORMAL
RESULT 2, 080566: ABNORMAL
RESULT 3, 080567: ABNORMAL
RESULT 4, 080568: ABNORMAL
SERVICE CMNT-IMP: ABNORMAL
SPECIMEN SOURCE: ABNORMAL

## 2021-06-15 ENCOUNTER — HOSPITAL ENCOUNTER (OUTPATIENT)
Dept: CARDIAC REHAB | Age: 76
Discharge: HOME | End: 2021-06-15
Payer: MEDICARE

## 2021-06-15 VITALS — BODY MASS INDEX: 24.13 KG/M2 | WEIGHT: 136.2 LBS

## 2021-06-15 PROCEDURE — G0239 OTH RESP PROC, GROUP: HCPCS

## 2021-06-17 ENCOUNTER — APPOINTMENT (OUTPATIENT)
Dept: CARDIAC REHAB | Age: 76
End: 2021-06-17
Payer: MEDICARE

## 2021-06-22 ENCOUNTER — HOSPITAL ENCOUNTER (OUTPATIENT)
Dept: CARDIAC REHAB | Age: 76
Discharge: HOME | End: 2021-06-22
Payer: MEDICARE

## 2021-06-22 VITALS — BODY MASS INDEX: 23.84 KG/M2 | WEIGHT: 134.6 LBS

## 2021-06-22 PROCEDURE — G0239 OTH RESP PROC, GROUP: HCPCS

## 2021-06-24 ENCOUNTER — HOSPITAL ENCOUNTER (OUTPATIENT)
Dept: CARDIAC REHAB | Age: 76
Discharge: HOME | End: 2021-06-24
Payer: MEDICARE

## 2021-06-24 PROCEDURE — G0239 OTH RESP PROC, GROUP: HCPCS

## 2021-06-29 ENCOUNTER — HOSPITAL ENCOUNTER (OUTPATIENT)
Dept: CARDIAC REHAB | Age: 76
Discharge: HOME | End: 2021-06-29
Payer: MEDICARE

## 2021-06-29 VITALS — WEIGHT: 137.2 LBS | BODY MASS INDEX: 24.3 KG/M2

## 2021-06-29 PROCEDURE — G0239 OTH RESP PROC, GROUP: HCPCS

## 2021-07-01 ENCOUNTER — HOSPITAL ENCOUNTER (OUTPATIENT)
Dept: CARDIAC REHAB | Age: 76
Discharge: HOME | End: 2021-07-01
Payer: MEDICARE

## 2021-07-01 LAB
FUNGUS CULTURE, RFCO2T: POSITIVE
FUNGUS ID 2, RFIM2T: NORMAL
FUNGUS SMEAR, RFCO1T: ABNORMAL
FUNGUS SPEC CULT: NORMAL
FUNGUS STAIN, 188244: NORMAL
REFLEX TO ID, RFCO3T: ABNORMAL
SPECIMEN SOURCE: ABNORMAL
SPECIMEN SOURCE: NORMAL
SPECIMEN SOURCE: NORMAL

## 2021-07-01 PROCEDURE — G0239 OTH RESP PROC, GROUP: HCPCS

## 2021-07-02 LAB
FUNGUS ID 1, (DID), RFIM1T: NORMAL
SPECIMEN SOURCE: NORMAL

## 2021-07-06 ENCOUNTER — HOSPITAL ENCOUNTER (OUTPATIENT)
Dept: CARDIAC REHAB | Age: 76
Discharge: HOME | End: 2021-07-06
Payer: MEDICARE

## 2021-07-06 VITALS — BODY MASS INDEX: 24.27 KG/M2 | WEIGHT: 137 LBS

## 2021-07-06 PROCEDURE — G0239 OTH RESP PROC, GROUP: HCPCS

## 2021-07-13 ENCOUNTER — HOSPITAL ENCOUNTER (OUTPATIENT)
Dept: CARDIAC REHAB | Age: 76
Discharge: HOME | End: 2021-07-13
Payer: MEDICARE

## 2021-07-13 VITALS — BODY MASS INDEX: 24.3 KG/M2 | WEIGHT: 137.2 LBS

## 2021-07-13 PROCEDURE — G0239 OTH RESP PROC, GROUP: HCPCS

## 2021-07-13 NOTE — PROGRESS NOTES
76year old female with sarcoidosis enrolled in pulmonary rehabilitation, seen today for initial nutrition counseling. States notable improvement with SOB since exercising, cooking 1x daily now. Stated Nutrition Goals: no specific goal at the start of session, by the end of session would like to decrease intake of added sugars and increase water intake. Medical History: arthritis, pulmonary HTN   Nutrition related medications/supplements: MVT, Calcium, Vitamin D3, B6 and B12, liquid turmeric. Nutrition Related Labs: EMR reviewed. Social History/Support System: supported by her  Dangelo. Physical Activity: Ambulates independently with oxygen support ; participates in 45-60 min of supervised rehabilitation 2x per week, off days pt would like to increase activity. Lifestyle, Culture Family Influence: no concerns voiced  NFPE:  No indication to perform today   Food and Nutrition Intake History:   Denies chewing/swallowing difficulties, or SOB when eating. Eats 3 meals per day. Typically cooks 1 meal per day. Cook methods includes grilled, baked, roasted, avoids fried, uses added sugars in baking, uses canola oil, enjoys many vegetables, and salads. Watches sodium in her diet. Stated 1 day diet recall includes   Breakfast: eggs, whole wheat toast with cheese, coffee with sugar. AM Snack: few pieces of pinapple  Lunch: skinless baked chicken, sweet tea  Snack: watermelon  Dinner: sauteed red cabbage (vinager, brown sugar), potatoes salad stuffing with gravy  Beverages: coffee, sweet tea, 8 ounces water. Alcohol use: occasional   One day recall food recall appears high in added sugars/simple carbohydrates. One day food recall appears inadequate in vegetables    GI Hx: /Digestive Issues: no concerns    Anthropometric Data:  BMI:  24.3 kg/m²   Height: 5' 3\" (1.6 m). Weight  62.2 kg (137 lb 3.2 oz). - weight has been stable and would like to maintain current body weight, and build lean muscle.   WC=34- healthy    Estimated Nutritional Needs:  Calories: MSJ x 1.3 (increased metabolic demand): 6239YFEI/PANTERA  Protein: 1.2-1.5g/kg BW for protein/muscle synthesis: 74-93g  Stage of Behavior Change: preparation     Nutrition Diagnosis:    Increased intake of carbohydrates related to food preferences/nutrition knowledge evidenced by diet recall     NUTRITION INTERVENTIONS:  1. MNT for Advanced Lung Disease: Antinflammatory Nutrition  Nutrition Prescription to Recommendation:   Daily Recommended Nutrients to slightly excess basal energy expenditure:  o Calories: 1400kcal.day  o Protein: 73-94grams per day  o Minimally Processed CHO:   o Fluids: 64 ounces of water day.   o Daily Multivitamin: Continue 1 tablet daily    Nutrition Education for Better Breathing:   Reviewed optimal nutrition to improve breathing and improve immune function. - Goals provided today   Reviewed, including sources of empty calories and portion sizes. o Guidelines for sodium ( < 2000mg/day or follow MD recommendations), and added sugars ( < 25 grams for women/<36 grams for men) and high sources of each reviewed    Handouts provided for home use:    Protein food sources   Recipe modification tips.  [employer] plate method   Tips for reducing sodium   Meal planning guide    Nutrition Goals:    To improve diet quality, by decreasing intake of added sugars, sodium and refined CHO, and increasing intake of quality protein foods and f/v by end of cardiopulmonary rehabilitation.  Increase protein and fluid intake to improve strength, stamina, and breathing by the end of pulmonary rehabilitation      Monitoring/Evaluation: RD to follow up with participant during rehab sessions for questions and assessment of progression toward goals. Anticipated Compliance:  Good; pt involved with goal planning today. Pt verbalizes understanding to recommendations discussed.    Barriers: None identified at this time     Magaly Ames, MS, RD, LD  Cardiopulmonary Rehab Dietitian

## 2021-07-15 ENCOUNTER — APPOINTMENT (OUTPATIENT)
Dept: CARDIAC REHAB | Age: 76
End: 2021-07-15
Payer: MEDICARE

## 2021-07-20 ENCOUNTER — HOSPITAL ENCOUNTER (OUTPATIENT)
Dept: CARDIAC REHAB | Age: 76
Discharge: HOME | End: 2021-07-20
Payer: MEDICARE

## 2021-07-20 VITALS — BODY MASS INDEX: 24.2 KG/M2 | WEIGHT: 136.6 LBS

## 2021-07-20 PROCEDURE — G0239 OTH RESP PROC, GROUP: HCPCS

## 2021-07-22 ENCOUNTER — APPOINTMENT (OUTPATIENT)
Dept: CARDIAC REHAB | Age: 76
End: 2021-07-22
Payer: MEDICARE

## 2021-07-26 LAB
ACID FAST STN SPEC: NEGATIVE
MYCOBACTERIUM SPEC QL CULT: NEGATIVE
SPECIMEN PREPARATION: NORMAL
SPECIMEN SOURCE: NORMAL

## 2021-07-27 ENCOUNTER — HOSPITAL ENCOUNTER (OUTPATIENT)
Dept: CARDIAC REHAB | Age: 76
Discharge: HOME | End: 2021-07-27
Payer: MEDICARE

## 2021-07-27 VITALS — BODY MASS INDEX: 24.16 KG/M2 | WEIGHT: 136.4 LBS

## 2021-07-27 PROCEDURE — G0239 OTH RESP PROC, GROUP: HCPCS

## 2021-07-29 ENCOUNTER — HOSPITAL ENCOUNTER (OUTPATIENT)
Dept: CARDIAC REHAB | Age: 76
Discharge: HOME | End: 2021-07-29
Payer: MEDICARE

## 2021-07-29 PROCEDURE — G0239 OTH RESP PROC, GROUP: HCPCS

## 2021-08-03 ENCOUNTER — HOSPITAL ENCOUNTER (OUTPATIENT)
Dept: CARDIAC REHAB | Age: 76
Discharge: HOME | End: 2021-08-03
Payer: MEDICARE

## 2021-08-03 PROCEDURE — G0239 OTH RESP PROC, GROUP: HCPCS

## 2021-08-05 ENCOUNTER — APPOINTMENT (OUTPATIENT)
Dept: CARDIAC REHAB | Age: 76
End: 2021-08-05
Payer: MEDICARE

## 2021-08-10 ENCOUNTER — APPOINTMENT (OUTPATIENT)
Dept: CARDIAC REHAB | Age: 76
End: 2021-08-10
Payer: MEDICARE

## 2021-08-12 ENCOUNTER — APPOINTMENT (OUTPATIENT)
Dept: CARDIAC REHAB | Age: 76
End: 2021-08-12
Payer: MEDICARE

## 2021-08-17 ENCOUNTER — APPOINTMENT (OUTPATIENT)
Dept: CARDIAC REHAB | Age: 76
End: 2021-08-17
Payer: MEDICARE

## 2021-08-19 ENCOUNTER — APPOINTMENT (OUTPATIENT)
Dept: CARDIAC REHAB | Age: 76
End: 2021-08-19
Payer: MEDICARE

## 2021-08-24 ENCOUNTER — APPOINTMENT (OUTPATIENT)
Dept: CARDIAC REHAB | Age: 76
End: 2021-08-24
Payer: MEDICARE

## 2021-08-26 ENCOUNTER — APPOINTMENT (OUTPATIENT)
Dept: CARDIAC REHAB | Age: 76
End: 2021-08-26
Payer: MEDICARE

## 2021-08-31 ENCOUNTER — APPOINTMENT (OUTPATIENT)
Dept: CARDIAC REHAB | Age: 76
End: 2021-08-31
Payer: MEDICARE

## 2021-09-02 ENCOUNTER — APPOINTMENT (OUTPATIENT)
Dept: CARDIAC REHAB | Age: 76
End: 2021-09-02

## 2021-09-07 ENCOUNTER — APPOINTMENT (OUTPATIENT)
Dept: CARDIAC REHAB | Age: 76
End: 2021-09-07

## 2021-09-09 ENCOUNTER — APPOINTMENT (OUTPATIENT)
Dept: CARDIAC REHAB | Age: 76
End: 2021-09-09

## 2021-10-11 ENCOUNTER — HOSPITAL ENCOUNTER (OUTPATIENT)
Dept: PHYSICAL THERAPY | Age: 76
Discharge: HOME | End: 2021-10-11
Payer: MEDICARE

## 2021-10-11 PROCEDURE — 97161 PT EVAL LOW COMPLEX 20 MIN: CPT

## 2021-10-11 NOTE — THERAPY EVALUATION
Nhung Liu  : 1945  Primary: Anabella Reece Medicare Hmo  Secondary:  [address]  at [facility] LIZ MENDIOLA  [address] [facility], [address] [place] [address],[address], [address] [facility]  [phone]   [phone]         OUTPATIENT PHYSICAL THERAPY:Initial Assessment 10/11/2021   ICD-10: Treatment Diagnosis:  Difficulty in walking, not elsewhere classified (R26.2); Pain in left hip (M25.552)  Precautions/Allergies:   Sulfa (sulfonamide antibiotics)   TREATMENT PLAN:  Effective Dates: 10/11/2021 TO 2021  Frequency/Duration: 1-2 visits per week for 6 weeks  MEDICAL/REFERRING DIAGNOSIS:  Other intervertebral disc degeneration, lumbar region [M51.36]  Radiculopathy, lumbar region [M54.16]  Trochanteric bursitis, left hip [M70.62]   DATE OF ONSET: Approximately 1 month ago  REFERRING PHYSICIAN: GABE Hunter MD Orders: Evaluate and treat, include modalities soft tissue mobilization and dry needling  Return MD Appointment: 2021     INITIAL ASSESSMENT:  Ms. Liu presents with L hip/buttock pain of insidious origin. She exhibits slight L hip abduction weakness, mild gait impairments, significant point tenderness to L greater trochanter and L gluteal musculature. She reports that symptoms have improved since receiving injection into L LE. She is likely to benefit from PT intervention in order to reduce L hip pain. PROBLEM LIST (Impacting functional limitations):  1. Decreased Strength  2. Decreased ADL/Functional Activities  3. Decreased Ambulation Ability/Technique  4. Increased Pain  5. Decreased Activity Tolerance  6. Decreased Flexibility/Joint Mobility  7. Decreased [place] with Home Exercise Program INTERVENTIONS PLANNED: (Treatment may consist of any combination of the following)  1. Home Exercise Program (HEP)  2. Manual Therapy  3. Neuromuscular Re-education/Strengthening  4. Range of Motion (ROM)  5. Therapeutic Activites  6. Therapeutic Exercise/Strengthening  7. Ultrasound (US)  8.  Dry needling     GOALS: (Goals have been discussed and agreed upon with patient.)    Discharge Goals: Time Frame: 6 weeks     1. Patient will report 50% improvement in L hip pain. 2. Patient will have 5/5 L hip abduction strength. 3. Patient will be able to lay on L side at night. 4. Patient will be independent with HEP. OUTCOME MEASURE:   Tool Used: Modified Oswestry Low Back Pain Questionnaire  Score:  Initial: 8/50  Most Recent: X/50 (Date: -- )   Interpretation of Score: Each section is scored on a 0-5 scale, 5 representing the greatest disability. The scores of each section are added together for a total score of 50. Score 0 1-10 11-20 21-30 31-40 41-49 50   Modifier CH CI CJ CK CL CM CN       MEDICAL NECESSITY:   · Skilled intervention continues to be required due to L LE pain. .   · Patient has to negotiate stairs in her   REASON FOR SERVICES/OTHER COMMENTS:  · Patient continues to require skilled intervention due to L hip pain which impairs her mobility and quality of life. .  Total Duration: 40 minutes  PT Patient Time In/Time Out  Time In: 1525  Time Out: 1605    Rehabilitation Potential For Stated Goals: Good  Regarding [facility] therapy, I certify that the treatment plan above will be carried out by a therapist or under their direction. Thank you for this referral,  Pilar Alfaro, PT     Referring Physician Signature: GABE Garcia _______________________________ Date _____________      PAIN/SUBJECTIVE:   Initial:   2-3/10 Post Session:  Not rated after   HISTORY:   History of Injury/Illness (Reason for Referral):  Patient reports that approximately 1 month ago she developed an insidious pain to L hip/buttock that felt like a \"catch\" and it became painful over time. It is hard to sleep on her L hip due to pain. Does not have pain when walking but at her first steps she does have pain after a period of rest. Does not have any numbness or tingling into L LE.  Describes pain as an \"aching\" sensation. Pain increase when going up stairs. Prolonged standing is ok. Does not have any pain in her back, just to her L hip/buttock and outside of L leg. Pain has been much less since the injection she received 2 weeks ago. She wants to be able to sleep on her side again and not have the pain in her leg. Past Medical History/Comorbidities:   Ms. Adelia Uribe  has a past medical history of Arthritis, Glaucoma, Ill-defined condition, Sarcoidosis, and Sarcoidosis of lung ([address] She also has no past medical history of Aneurysm ([address] Arrhythmia, Asthma, Autoimmune disease ([address] CAD (coronary artery disease), Cancer ([address] Chronic kidney disease, Chronic obstructive pulmonary disease ([address] Chronic pain, Coagulation disorder ([address] Difficult intubation, Endocarditis, GERD (gastroesophageal reflux disease), Heart failure ([address] Hypertension, Liver disease, Malignant hyperthermia due to anesthesia, Morbid obesity ([address] Nausea & vomiting, Other ill-defined conditions(799.89), Pseudocholinesterase deficiency, Psychiatric disorder, PUD (peptic ulcer disease), Rheumatic fever, Seizures ([address] Stroke ([address] Thromboembolus ([address] Thyroid disease, Unspecified adverse effect of anesthesia, or Unspecified sleep apnea. Ms. Adelia Uribe  has a past surgical history that includes hx hysterectomy (1979); hx open cholecystectomy (1978); hx breast biopsy (Left, 1980); hx appendectomy; hx colonoscopy (2017); and ir occl txcath hemmorage w si (1/6/2021). Social History/Living Environment:    Patient lives with her  in a two-story house. Prior Level of Function/Work/Activity:  Patient is retired. Ambulatory/Rehab Services H2 Model Falls Risk Assessment   Risk Factors:       No Risk Factors Identified Ability to Rise from Chair:       (0)  Ability to rise in a single movement   Falls Prevention Plan:       No modifications necessary   Total: (5 or greater = High Risk): 0   ©2010 [facility]. All Rights Reserved. [employer] States Patent #[id number]. Federal Law prohibits the replication, distribution or use without written permission from [facility] [employer] [facility]   Current Medications: per review with patient      Current Outpatient Medications:     treprostiniL (Tyvaso) 1.74 mg/2.9 mL (0.6 mg/mL) nebu nebulizer solution, Take 3 Inhalation by inhalation every 4 hours daily while awake resp., Disp: , Rfl:     albuterol (PROVENTIL VENTOLIN) 2.5 mg /3 mL (0.083 %) nebu, 3 mL by Nebulization route three (3) times daily. Indications: bronchospasm prevention, Disp: 270 Each, Rfl: 3    cholecalciferol, vitamin D3, (Vitamin D3) 50 mcg (2,000 unit) tab, Take 1 Tab by mouth daily. , Disp: 90 Tab, Rfl: 3    turmeric/turmeric ext/pepr ext (TURMERIC-TURMERIC EXT-PEPPER) 500-3 mg cap, Take  by mouth., Disp: , Rfl:     calcium-cholecalciferol, d3, (CALCIUM 600 + D) 600-125 mg-unit tab, Take  by mouth., Disp: , Rfl:     OXYGEN-AIR DELIVERY SYSTEMS, 2 L by Nasal route nightly., Disp: , Rfl:     pyridoxine, vitamin B6, (VITAMIN B-6) 100 mg tablet, Take 100 mg by mouth., Disp: , Rfl:     brimonidine-timolol (COMBIGAN) 0.2-0.5 % drop ophthalmic solution, 1 Drop every twelve (12) hours. , Disp: , Rfl:     multivitamin (ONE A DAY) tablet, Take 1 Tab by mouth daily. , Disp: , Rfl:    Date Last Reviewed:  10/11/2021   Number of Personal Factors/Comorbidities that affect the Plan of Care: 1-2: MODERATE COMPLEXITY   EXAMINATION:   10/11/2021    Observation/Orthostatic Postural Assessment:          Patient ambulates without evidence of analgic gait pattern. Mild genu valgus bilaterally, mild pronation bilaterally, and mild Trendelenberg gait pattern with his more noticeable on the left. Palpation:          Very tender to L greater trochanter, gluteus kathie, gluteus medius, and tensor fascia latae. No sensory deficits to light touch throughout LE dermatomes. ROM:         Lumbar flexion, extension and rotation AROM all WNL.         Bilateral hip range of motion within normal limits for flexion, abduction, internal and external rotation. No significant asymmetries noted. Reduced hip extension range of motion per visual observation with ambulation and increased tightness noted to L hip flexors in side-lying with passive motion. B knee ROM WNL  Strength:          Hip flexion 5/5 B        Hip abduction L 4/5, R 5/5        Knee extension 5/5 B        Knee flexion 5/5 B        Ankle DF 5/5 B   Body Structures Involved:  1. Nerves  2. Bones  3. Joints  4. Muscles  5. Ligaments Body Functions Affected:  1. Sensory/Pain  2. Neuromusculoskeletal  3. Movement Related Activities and Participation Affected:  1. General Tasks and Demands  2. Mobility  3. Domestic Life  4.  Community, Social and [place]   Number of elements (examined above) that affect the Plan of Care: 4+: HIGH COMPLEXITY   CLINICAL PRESENTATION:   Presentation: Stable and uncomplicated: LOW COMPLEXITY   CLINICAL DECISION MAKING:   Use of outcome tool(s) and clinical judgement create a POC that gives a: Clear prediction of patient's progress: LOW COMPLEXITY

## 2021-10-14 ENCOUNTER — HOSPITAL ENCOUNTER (OUTPATIENT)
Dept: MAMMOGRAPHY | Age: 76
Discharge: HOME | End: 2021-10-14
Attending: NURSE PRACTITIONER
Payer: MEDICARE

## 2021-10-14 DIAGNOSIS — Z12.31 VISIT FOR SCREENING MAMMOGRAM: ICD-10-CM

## 2021-10-14 PROCEDURE — 77067 SCR MAMMO BI INCL CAD: CPT

## 2021-11-01 ENCOUNTER — HOSPITAL ENCOUNTER (OUTPATIENT)
Dept: PHYSICAL THERAPY | Age: 76
Discharge: HOME | End: 2021-11-01
Payer: MEDICARE

## 2021-11-01 DIAGNOSIS — M54.16 LUMBAR RADICULOPATHY: ICD-10-CM

## 2021-11-01 DIAGNOSIS — M70.62 TROCHANTERIC BURSITIS OF LEFT HIP: ICD-10-CM

## 2021-11-01 DIAGNOSIS — M51.36 DDD (DEGENERATIVE DISC DISEASE), LUMBAR: ICD-10-CM

## 2021-11-01 PROCEDURE — 97110 THERAPEUTIC EXERCISES: CPT

## 2021-11-01 PROCEDURE — 97140 MANUAL THERAPY 1/> REGIONS: CPT

## 2021-11-01 NOTE — PROGRESS NOTES
Jaden Concepcion Blachly  : 1945  Payor: Garrick Jones / Plan: [address] HMO / Product Type: Managed Care Medicare /  [address] [place] [address]  at [facility] LIZ MENDIOLA  [address] [facility], [address], [place] [address].  [phone]   [phone]       OUTPATIENT PHYSICAL THERAPY: Daily Treatment Note 2021  Visit Count: 2     ICD-10: Treatment Diagnosis: Difficulty in walking, not elsewhere classified (R26.2); Pain in left hip (M25.552)  Precautions/Allergies:   Sulfa (sulfonamide antibiotics)   TREATMENT PLAN:  Effective Dates: 10/11/21 TO 21  Frequency/Duration: 1-2 times a week for 6 weeks. MEDICAL/REFERRING DIAGNOSIS:  Other intervertebral disc degeneration, lumbar region [M51.36]  Radiculopathy, lumbar region [M54.16]  Trochanteric bursitis, left hip [M70.62]   DATE OF ONSET: Approximately one month before the evaluation  REFERRING PHYSICIAN: GABE Chaparro MD Orders: Evaluate and treat  Return MD Appointment: 21       Pre-treatment Symptoms/Complaints:  Pt report increased pain with ambulation and sleeping. Pain: Initial:  5/10 L hip with walking  Post Session:  5/10   Medications Last Reviewed: 21    Updated Objective Findings:        TREATMENT:   Manual Therapy (15 min): instrument assisted massage to L hip and IT band with roller to decrease pain and increase mobility. Soft tissue mobilization to L hip. Therapeutic Exercise: ( 30 min):  Exercises per grid below to improve mobility and strength. Required MOD verbal and tactile cues to promote proper body mechanics. Progressed resistance and repetitions as indicated.       Date:  21 Date:   Date:     Activity/Exercise Parameters Parameters Parameters   Lower trunk rotation x30 supine     B SLR 2x10      bridges 2x10     clamshells x20 side lying     Side lying hip abduction x20 B     Long arc quad x20 B     Sit to stand 3x10 from mat table                 HEP[facility]   [facility]    Treatment/Session Summary: · Response to Treatment:  Pt was educated on sleeping position to decrease pain. She did not report increased pain during exercise. Continue to progress hip strengthening. · Communication/Consultation:  None today  · Equipment provided today:  None today  · Recommendations/Intent for next treatment session: Next visit will focus on decreasing hip pain and increasing strength.     Total Treatment Billable Duration:  45 min  PT Patient Time In/Time Out  Time In: 1345  Time Out: [address], PTA    Future Appointments   Date Time Provider Alesha Brumfield   11/4/2021  8:45 AM Michael Patches, PTA [facility]   11/4/2021  2:45 PM Oly Manuel PA PO[facility] POA   11/5/2021  9:45 AM Michael Patches, PTA [facility]   11/8/2021  8:15 AM Richardean Krill., PT [facility]   11/10/2021  9:00 AM Richardean Krill., PT [facility]   11/12/2021  9:45 AM Michael Patches, PTA [facility]   11/15/2021  8:15 AM Richardean Krill., PT [facility]   11/17/2021  9:00 AM Richardean Krill., PT [facility]   11/18/2021 10:45 AM Richardean Krill., PT [facility]   11/22/2021  8:45 AM Michael Patches, PTA [facility]   11/24/2021  9:00 AM Richardean Krill., PT [facility]   12/2/2021  9:30 AM SFD DT ECHO [facility] SFD   1/11/2022  9:00 AM PP PFT LAB SSA PP PP   1/11/2022  9:30 AM GABE Merrlil SSA PP PP

## 2021-11-04 ENCOUNTER — APPOINTMENT (OUTPATIENT)
Dept: PHYSICAL THERAPY | Age: 76
End: 2021-11-04
Payer: MEDICARE

## 2021-11-05 ENCOUNTER — HOSPITAL ENCOUNTER (OUTPATIENT)
Dept: PHYSICAL THERAPY | Age: 76
Discharge: HOME | End: 2021-11-05
Payer: MEDICARE

## 2021-11-05 PROCEDURE — 97110 THERAPEUTIC EXERCISES: CPT

## 2021-11-05 NOTE — PROGRESS NOTES
Ari Woods Bagwell  : 1945  Payor: Chika Maliny / Plan: [address] HMO / Product Type: Managed Care Medicare /  [address] [place] [address]  at [address] [place] [address] Rd  1101 [facility], [address], [address] [place] [address]  [phone]   [phone]       OUTPATIENT PHYSICAL THERAPY: Daily Treatment Note 2021  Visit Count: 3     ICD-10: Treatment Diagnosis: Difficulty in walking, not elsewhere classified (R26.2); Pain in left hip (M25.552)  Precautions/Allergies:   Sulfa (sulfonamide antibiotics)   TREATMENT PLAN:  Effective Dates: 10/11/21 TO 21  Frequency/Duration: 1-2 times a week for 6 weeks. MEDICAL/REFERRING DIAGNOSIS:  Other intervertebral disc degeneration, lumbar region [M51.36]  Radiculopathy, lumbar region [M54.16]  Trochanteric bursitis, left hip [M70.62]   DATE OF ONSET: Approximately one month before the evaluation  REFERRING PHYSICIAN: GABE Alford MD Orders: Evaluate and treat  Return MD Appointment: 21       Pre-treatment Symptoms/Complaints:  Pt states \"I hurt worse after you massaged me. It was puffy and swollen. \"  Pain: Initial:  5/10 L hip with walking  Post Session:  0/10 at rest, 5/10 with ambulation   Medications Last Reviewed: 21    Updated Objective Findings:        TREATMENT:     Therapeutic Exercise: ( 45 min):  Exercises per grid below to improve mobility and strength. Required MOD verbal and tactile cues to promote proper body mechanics. Progressed resistance and repetitions as indicated. Date:  21 Date:  21 Date:     Activity/Exercise Parameters Parameters Parameters   Lower trunk rotation x30 supine x30 supine    B SLR 2x10  2x10    bridges 2x10 2x10    clamshells x20 side lying 2x10 B side lying    Side lying hip abduction x20 B     Long arc quad x20 B     Sit to stand 3x10 from mat table 3x10 from mat table    Psoas stretch  Hook lying with opposite leg extended and pelvic tilt. 5 sec hold x 3 ea side.  Then supine hook lying position with knees adducted to midline to release psoas, 1 min hold    Standing hip extension with band  x10 B LE with UE support at mat table    Supine hamstring stretch with strap  30 sec hold x 4 with therapist assist    Supine piriformis stretch  30 sec hold x 4 B with therapist assist    Pelvic tilt  x15 with tactile cues          HEP:   [facility]    Treatment/Session Summary:    · Response to Treatment:  Pt was educated on using a moist hot pack for pain relief. She also states that she may try sleeping in the recliner to decrease her hip pain with sleeping. Pt did not report increased pain during exercise but she required frequent verbal and tactile cues to perform exercises with correct body mechanics. Pt continues to ambulate with an antalgic gait. · Communication/Consultation:  None today  · Equipment provided today:  None today  · Recommendations/Intent for next treatment session: Next visit will focus on decreasing hip pain and increasing strength.     Total Treatment Billable Duration:  45 min  PT Patient Time In/Time Out  Time In: 0945  Time Out: [address], [place]    Future Appointments   Date Time Provider Alesha Brumfield   11/8/2021  8:15 AM Sue Clipper., PT [facility]   11/12/2021  9:45 AM Pete Spears PTA [facility]   11/15/2021  8:15 AM Sue Clipper., PT [facility]   11/18/2021 10:45 AM Sue Clipper., PT [facility]   11/22/2021  8:45 AM Pete Spears PTA [facility]   11/24/2021  9:00 AM Sue Clipper., PT [facility]   12/2/2021  9:30 AM SFD DT ECHO [facility] SFD   1/11/2022  9:00 AM PP PFT LAB [facility]SA PP PP   1/11/2022  9:30 AM GABE Levi SSA PP PP

## 2021-11-08 ENCOUNTER — HOSPITAL ENCOUNTER (OUTPATIENT)
Dept: PHYSICAL THERAPY | Age: 76
Discharge: HOME | End: 2021-11-08
Payer: MEDICARE

## 2021-11-08 PROCEDURE — 97110 THERAPEUTIC EXERCISES: CPT

## 2021-11-08 PROCEDURE — 97140 MANUAL THERAPY 1/> REGIONS: CPT

## 2021-11-08 NOTE — PROGRESS NOTES
Ann Liu  : 1945  Payor: Brian Huntley / Plan: [address] HMO / Product Type: Managed Care Medicare /  [address] [place] [address]  [address]  1101 [facility], [address], [place] [address].  [phone]   [phone]       OUTPATIENT PHYSICAL THERAPY: Daily Treatment Note 2021  Visit Count: 4     ICD-10: Treatment Diagnosis: Difficulty in walking, not elsewhere classified (R26.2); Pain in left hip (M25.552)  Precautions/Allergies:   Sulfa (sulfonamide antibiotics)   TREATMENT PLAN:  Effective Dates: 10/11/21 TO 21  Frequency/Duration: 1-2 times a week for 6 weeks. MEDICAL/REFERRING DIAGNOSIS:  Other intervertebral disc degeneration, lumbar region [M51.36]  Radiculopathy, lumbar region [M54.16]  Trochanteric bursitis, left hip [M70.62]   DATE OF ONSET: Approximately one month before the evaluation  REFERRING PHYSICIAN: GABE Villavicencio MD Orders: Evaluate and treat  Return MD Appointment: 21       Pre-treatment Symptoms/Complaints:  Pain is worse than it was after the injection, which totally relieved her pain for about 2 weeks. Pain is not really present until she walks. Does not have pain with standing or sitting. Is not sleeping on her side now to avoid pain. Pain: Initial:  None today Post Session:  0/10 at rest, 5/10 with ambulation   Medications Last Reviewed: 21    Updated Objective Findings:        TREATMENT:     Manual Therapy (10 minutes): Soft tissue mobilizations to reduce L lower quarter discomfort and tightness. Therapeutic Exercise: ( 30 min):  Exercises per grid below to improve mobility and strength. Required MOD verbal and tactile cues to promote proper body mechanics. Progressed resistance and repetitions as indicated. L hamstring stretches (3 x 20\") and L hip flexor stretches (3 x 20\") in side-lying to reduce L LE discomfort.       Date:  21 Date:  21 Date:  21   Activity/Exercise Parameters Parameters Parameters Lower trunk rotation x30 supine x30 supine    B SLR 2x10  2x10    bridges 2x10 2x10 3 x 10   clamshells x20 side lying 2x10 B side lying 2 x 10 0#  Blue  2x 10 L   Side lying hip abduction x20 B  3 x 5 isometric holds   Long arc quad x20 B     Sit to stand 3x10 from mat table 3x10 from mat table 3 x 10 from weight bench   Psoas stretch  Hook lying with opposite leg extended and pelvic tilt. 5 sec hold x 3 ea side. Then supine hook lying position with knees adducted to midline to release psoas, 1 min hold See above   Standing hip extension with band  x10 B LE with UE support at mat table    Supine hamstring stretch with strap  30 sec hold x 4 with therapist assist See above   Supine piriformis stretch  30 sec hold x 4 B with therapist assist    Pelvic tilt  x15 with tactile cues 3 x 10 with tactile cues   Lateral walking   3 trials, ~20'    Standing marches    W/UE support  3 x 10 ea LE             HEP:     Treatment/Session Summary:    · Response to Treatment:  Hip abduction weakness evident with side-lying strengthening. Patient tender to L gluteals. · Communication/Consultation:  None today  · Equipment provided today:  None today  · Recommendations/Intent for next treatment session: Next visit will focus on decreasing hip pain and increasing strength.     Total Treatment Billable Duration:  40 min  PT Patient Time In/Time Out  Time In: 0825  Time Out: [address] [facility], PT    Future Appointments   Date Time Provider Alesha Brumfield   11/12/2021  9:45 AM Donaldo Lester PTA [facility]   11/15/2021  8:15 AM Michelle May., [facility]   11/18/2021 10:45 AM Michelle May., PT [facility]   11/22/2021  8:45 AM Donaldo Lester, EDDIE [facility]   11/24/2021  9:00 AM Michelle May., PT [facility]   12/2/2021  9:30 AM SFD DT ECHO [facility]FD   1/11/2022  9:00 AM PP PFT LAB [facility] PP PP   1/11/2022  9:30 AM GABE Oliva SSA PP PP

## 2021-11-10 ENCOUNTER — APPOINTMENT (OUTPATIENT)
Dept: PHYSICAL THERAPY | Age: 76
End: 2021-11-10
Payer: MEDICARE

## 2021-11-12 ENCOUNTER — HOSPITAL ENCOUNTER (OUTPATIENT)
Dept: PHYSICAL THERAPY | Age: 76
Discharge: HOME | End: 2021-11-12
Payer: MEDICARE

## 2021-11-12 PROCEDURE — 97110 THERAPEUTIC EXERCISES: CPT

## 2021-11-12 NOTE — PROGRESS NOTES
Nhung Chase  : 1945  Payor: Danielle Starks / Plan: [address] HMO / Product Type: Managed Care Medicare /  [address] [place] [address]  at [address] [place] [address] Rd  1101 [facility], [address], [place] [address].  [phone]   [phone]       OUTPATIENT PHYSICAL THERAPY: Daily Treatment Note 2021  Visit Count: 5     ICD-10: Treatment Diagnosis: Difficulty in walking, not elsewhere classified (R26.2); Pain in left hip (M25.552)  Precautions/Allergies:   Sulfa (sulfonamide antibiotics)   TREATMENT PLAN:  Effective Dates: 10/11/21 TO 21  Frequency/Duration: 1-2 times a week for 6 weeks. MEDICAL/REFERRING DIAGNOSIS:  Other intervertebral disc degeneration, lumbar region [M51.36]  Radiculopathy, lumbar region [M54.16]  Trochanteric bursitis, left hip [M70.62]   DATE OF ONSET: Approximately one month before the evaluation  REFERRING PHYSICIAN: GABE Hunter MD Orders: Evaluate and treat  Return MD Appointment: 21       Pre-treatment Symptoms/Complaints:  Patient states \"I'm sorry I was late but I had to cough all this stuff up. \"  Pain: Initial:  5/10 with ambulation Post Session:  0/10 at rest, 5/10 with ambulation   Medications Last Reviewed: 21    Updated Objective Findings:        TREATMENT:     Manual Therapy (none): Soft tissue mobilizations to reduce L lower quarter discomfort and tightness. Therapeutic Exercise: ( 35 min):  Exercises per grid below to improve mobility and strength. Required MOD verbal and tactile cues to promote proper body mechanics. Progressed resistance and repetitions as indicated. L hamstring stretches (3 x 20\") and L hip flexor stretches (3 x 20\") in side-lying to reduce L LE discomfort.       Date:  21 Date:  21 Date:  21 Date  21   Activity/Exercise Parameters Parameters Parameters    Lower trunk rotation x30 supine x30 supine  x30 supine   B SLR 2x10  2x10     bridges 2x10 2x10 3 x 10 3x10   clamshells x20 side lying 2x10 B side lying 2 x 10 0#  Blue  2x 10 L 3x10 B in side lying   Side lying hip abduction x20 B  3 x 5 isometric holds    Long arc quad x20 B      Sit to stand 3x10 from mat table 3x10 from mat table 3 x 10 from weight bench 3x10 from mat table with UE support   Psoas stretch  Hook lying with opposite leg extended and pelvic tilt. 5 sec hold x 3 ea side. Then supine hook lying position with knees adducted to midline to release psoas, 1 min hold See above Side lying 30 sec hold x4 B with therapist assist   Standing hip extension with band  x10 B LE with UE support at mat table     Supine hamstring stretch with strap  30 sec hold x 4 with therapist assist See above 30 sec hold x 4 B in side lying with therapist assist   Supine piriformis stretch  30 sec hold x 4 B with therapist assist     Pelvic tilt  x15 with tactile cues 3 x 10 with tactile cues    Lateral walking   3 trials, ~20'  10 ft x 3 times   Standing marches    W/UE support  3 x 10 ea LE    Gait training    30 ft. Pt demonstrates increased trunk lean to R and decreased hip extension       HEP:     Treatment/Session Summary:    · Response to Treatment:  Pt leans to the R during ambulation and reports increased left buttock pain. She is able to pinpoint her pain in the left glute, possibly bursitis. · Communication/Consultation:  None today  · Equipment provided today:  None today  · Recommendations/Intent for next treatment session: Next visit will focus on decreasing hip pain and increasing strength.     Total Treatment Billable Duration:  35 min  PT Patient Time In/Time Out  Time In: 8333  Time Out: [address] [place], [place]    Future Appointments   Date Time Provider Alesha Brumfield   11/15/2021  8:15 AM Juan Antonio Marinelli., PT [facility]   11/18/2021 10:45 AM Juan Antonio Marinelli., PT [facility]   11/22/2021  8:45 AM Giovanny Montoya, EDDIE [facility]   11/24/2021  9:00 AM Juan Antonio Marinelli., PT [facility] 12/2/2021  9:30 AM SFD DT ECHO [facility] SFD   1/11/2022  9:00 AM PP PFT LAB [facility] PP PP   1/11/2022  9:30 AM Linda Birmingham PA [facility] PP PP

## 2021-11-15 ENCOUNTER — HOSPITAL ENCOUNTER (OUTPATIENT)
Dept: PHYSICAL THERAPY | Age: 76
Discharge: HOME | End: 2021-11-15
Payer: MEDICARE

## 2021-11-15 PROCEDURE — 97110 THERAPEUTIC EXERCISES: CPT

## 2021-11-15 NOTE — PROGRESS NOTES
Lizzie Ann Noe  : 1945  Payor: Leslie Neri / Plan: [address] [place] [address] HMO / Product Type: Managed Care Medicare /  [address] [place] [address]  at [facility] LIZ MENDIOLA  1101 [facility], [address], [address] [place] [address]  [phone]   [phone]       OUTPATIENT PHYSICAL THERAPY: Daily Treatment Note 11/15/2021  Visit Count: 6     ICD-10: Treatment Diagnosis: Difficulty in walking, not elsewhere classified (R26.2); Pain in left hip (M25.552)  Precautions/Allergies:   Sulfa (sulfonamide antibiotics)   TREATMENT PLAN:  Effective Dates: 10/11/21 TO 21  Frequency/Duration: 1-2 times a week for 6 weeks. MEDICAL/REFERRING DIAGNOSIS:  Other intervertebral disc degeneration, lumbar region [M51.36]  Radiculopathy, lumbar region [M54.16]  Trochanteric bursitis, left hip [M70.62]   DATE OF ONSET: Approximately one month before the evaluation  REFERRING PHYSICIAN: GABE Henderson MD Orders: Evaluate and treat  Return MD Appointment: 21       Pre-treatment Symptoms/Complaints:  Patient states \"The hip feels stiff. \"  Pain: Initial:  5/10 with ambulation Post Session:  0/10 at rest, 5/10 with ambulation   Medications Last Reviewed: 11/15/21. Lasix added PRN on 21. Updated Objective Findings:        TREATMENT:     Manual Therapy (none): Soft tissue mobilizations to reduce L lower quarter discomfort and tightness. Therapeutic Exercise: ( 35 min):  Exercises per grid below to improve mobility and strength. Required MOD verbal and tactile cues to promote proper body mechanics. Progressed resistance and repetitions as indicated. L hamstring stretches (3 x 20\") and L hip flexor stretches (3 x 20\") in side-lying to reduce L LE discomfort.       Date:  21 Date:  21 Date:  21 Date  21 Date  11/15/21   Activity/Exercise Parameters Parameters Parameters     Lower trunk rotation x30 supine x30 supine  x30 supine x30 supine   B SLR 2x10  2x10   2x10 B   bridges 2x10 2x10 3 x 10 3x10 3x10   clamshells x20 side lying 2x10 B side lying 2 x 10 0#  Blue  2x 10 L 3x10 B in side lying 2x15 B side lying   Side lying hip abduction x20 B  3 x 5 isometric holds     Long arc quad x20 B       Sit to stand 3x10 from mat table 3x10 from mat table 3 x 10 from weight bench 3x10 from mat table with UE support 3x10 from mat table with UE support   Psoas stretch  Hook lying with opposite leg extended and pelvic tilt. 5 sec hold x 3 ea side. Then supine hook lying position with knees adducted to midline to release psoas, 1 min hold See above Side lying 30 sec hold x4 B with therapist assist 30 sec hold x 4 B LE with therapist assist in side lying   Standing hip extension with band  x10 B LE with UE support at mat table      Supine hamstring stretch with strap  30 sec hold x 4 with therapist assist See above 30 sec hold x 4 B in side lying with therapist assist    Supine piriformis stretch  30 sec hold x 4 B with therapist assist   30 sec hold x4 B   Pelvic tilt  x15 with tactile cues 3 x 10 with tactile cues  x15 with tactile cues   Lateral walking   3 trials, ~20'  10 ft x 3 times    Standing marches    W/UE support  3 x 10 ea LE     Gait training    30 ft. Pt demonstrates increased trunk lean to R and decreased hip extension        HEP:     Treatment/Session Summary:    · Response to Treatment:  Pt reported increased B knee pain during sit to stands and reported increased tightness in the L hip during the piriformis stretch. · Communication/Consultation:  None today  · Equipment provided today:  None today  · Recommendations/Intent for next treatment session: Next visit will focus on decreasing hip pain and increasing strength.     Total Treatment Billable Duration:  35 min (pt was a few min late)  PT Patient Time In/Time Out  Time In: 6139  Time Out: 33 Tamica Hong PTA    Future Appointments   Date Time Provider Alesha Brumfield   11/18/2021 10:45 AM Mary Kay Amaro., PT [facility] 11/22/2021  8:45 AM [facility]ake[facility]   11/24/2021  9:00 AM Chelo Lopez., [facility]   12/2/2021  9:30 AM SFD DT ECHO [facility]IC D   1/11/2022  9:00 AM PP PFT LAB [facility]SA PP PP   1/11/2022  9:30 AM GABE Rodríguez SSA PP PP

## 2021-11-17 ENCOUNTER — APPOINTMENT (OUTPATIENT)
Dept: PHYSICAL THERAPY | Age: 76
End: 2021-11-17
Payer: MEDICARE

## 2021-11-18 ENCOUNTER — HOSPITAL ENCOUNTER (OUTPATIENT)
Dept: PHYSICAL THERAPY | Age: 76
Discharge: HOME | End: 2021-11-18
Payer: MEDICARE

## 2021-11-18 PROCEDURE — 97110 THERAPEUTIC EXERCISES: CPT

## 2021-11-18 NOTE — PROGRESS NOTES
Natalie Smith Hardin  : 1945  Payor: Radhakeisha Whiteheadpaulette / Plan: [address] HMO / Product Type: Managed Care Medicare /  [address] [place] [address]  at [facility] LIZ MENDIOLA  1101 [facility], [address], [place] [address].  [phone]   [phone]       OUTPATIENT PHYSICAL THERAPY: Daily Treatment Note 2021  Visit Count: 7     ICD-10: Treatment Diagnosis: Difficulty in walking, not elsewhere classified (R26.2); Pain in left hip (M25.552)  Precautions/Allergies:   Sulfa (sulfonamide antibiotics)   TREATMENT PLAN:  Effective Dates: 10/11/21 TO 21  Frequency/Duration: 1-2 times a week for 6 weeks. MEDICAL/REFERRING DIAGNOSIS:  Other intervertebral disc degeneration, lumbar region [M51.36]  Radiculopathy, lumbar region [M54.16]  Trochanteric bursitis, left hip [M70.62]   DATE OF ONSET: Approximately one month before the evaluation  REFERRING PHYSICIAN: GABE Sanchez MD Orders: Evaluate and treat  Return MD Appointment: TBD       Pre-treatment Symptoms/Complaints:  Patient has been sleeping in a reclining chair because she was having pain with sleeping on her back. Pain: Initial:  5/10 with ambulation Post Session:  0/10 at rest, 5/10 with ambulation   Medications Last Reviewed: 11/15/21. Lasix added PRN on 21. Updated Objective Findings:   Pelvic obliquity evident with L medial malleolus distal to R medial malleolus when in supine but this asymmetry was not present when in long sitting. TREATMENT:     Therapeutic Exercise: ( 40 min):  Exercises per grid below to improve mobility and strength. Required occasional verbal and tactile cues to promote proper body mechanics. Progressed resistance and repetitions as indicated. L hamstring stretches (3 x 20\") in supine and L hip flexor stretches (3 x 20\") in modified Hugh Test position to reduce L LE discomfort. Muscle-energy technique performed to address pelvic obliquity with apparent correction afterward.       Date:  21 Date:  11/5/21 Date:  11/8/21 Date  11/12/21 Date  11/15/21 Date  11/18/21   Activity/Exercise Parameters Parameters Parameters      Lower trunk rotation x30 supine x30 supine  x30 supine x30 supine    B SLR 2x10  2x10   2x10 B    bridges 2x10 2x10 3 x 10 3x10 3x10 3 x 10   clamshells x20 side lying 2x10 B side lying 2 x 10 0#  Blue  2x 10 L 3x10 B in side lying 2x15 B side lying 3 x 10-12 ea  *Blue band used on R LE, no resistance to L LE   Side lying hip abduction x20 B  3 x 5 isometric holds   -   Long arc quad x20 B        Sit to stand 3x10 from mat table 3x10 from mat table 3 x 10 from weight bench 3x10 from mat table with UE support 3x10 from mat table with UE support    Psoas stretch  Hook lying with opposite leg extended and pelvic tilt. 5 sec hold x 3 ea side. Then supine hook lying position with knees adducted to midline to release psoas, 1 min hold See above Side lying 30 sec hold x4 B with therapist assist 30 sec hold x 4 B LE with therapist assist in side lying See above   Standing hip extension with band  x10 B LE with UE support at mat table    Exagerrated steps when ambulating, 5 x 50'   Supine hamstring stretch with strap  30 sec hold x 4 with therapist assist See above 30 sec hold x 4 B in side lying with therapist assist  See above   Supine piriformis stretch  30 sec hold x 4 B with therapist assist   30 sec hold x4 B -   Pelvic tilt  x15 with tactile cues 3 x 10 with tactile cues  x15 with tactile cues 3 x 10     + hip ADD x 30  + hip ABD x 30   Lateral walking   3 trials, ~20'  10 ft x 3 times     Standing marches    W/UE support  3 x 10 ea LE      Gait training    30 ft. Pt demonstrates increased trunk lean to R and decreased hip extension     Shuttle leg press      Bilateral  50# 3 x 10       HEP: Provided patient with a handout for posterior pelvic tilts, clamshells and bridging for improved pelvic stability and glute strength.     Treatment/Session Summary:    · Response to Treatment:  Pt remains weak to L LE gluteals. Was able to maintain pelvic stability throughout treatment once correction was performed. · Communication/Consultation:  None today  · Equipment provided today:  None today  · Recommendations/Intent for next treatment session: Next visit will focus on decreasing hip pain and increasing strength.     Total Treatment Billable Duration: 40 minutes  PT Patient Time In/Time Out  Time In: 1050  Time Out: 1200 [facility] Bala Sanford, PT    Future Appointments   Date Time Provider Alesha Bakeri   11/22/2021  8:45 AM Jose Luis Lagos PTA [facility]   11/24/2021  9:00 AM Kory Del Cid., PT [facility]   12/2/2021  9:30 AM SFD DT ECHO SFDNIC SFD   1/11/2022  9:00 AM PP PFT LAB SSA PP PP   1/11/2022  9:30 AM GABE Oden SSA PP PP

## 2021-11-24 ENCOUNTER — HOSPITAL ENCOUNTER (OUTPATIENT)
Dept: PHYSICAL THERAPY | Age: 76
Discharge: HOME | End: 2021-11-24
Payer: MEDICARE

## 2021-11-24 PROCEDURE — 97110 THERAPEUTIC EXERCISES: CPT

## 2021-11-24 NOTE — PROGRESS NOTES
Dickson Liu  : 1945  Payor: Jeovanny Bueno / Plan: [address] HMO / Product Type: Managed Care Medicare /  [address] [place] [address]  at [facility] LIZ MENDIOLA  1101 [facility], [address], [place] [address].  [phone]   [phone]       OUTPATIENT PHYSICAL THERAPY: Daily Treatment Note 2021  Visit Count: 8     ICD-10: Treatment Diagnosis: Difficulty in walking, not elsewhere classified (R26.2); Pain in left hip (M25.552)  Precautions/Allergies:   Sulfa (sulfonamide antibiotics)   TREATMENT PLAN:  Effective Dates: 10/11/21 TO 21  Frequency/Duration: 1-2 times a week for 6 weeks. MEDICAL/REFERRING DIAGNOSIS:  Other intervertebral disc degeneration, lumbar region [M51.36]  Radiculopathy, lumbar region [M54.16]  Trochanteric bursitis, left hip [M70.62]   DATE OF ONSET: Approximately one month before the evaluation  REFERRING PHYSICIAN: GABE Taylor MD Orders: Evaluate and treat  Return MD Appointment: TBD       Pre-treatment Symptoms/Complaints:  Patient reports that she does not have much pain today at rest but she does when she ambulates. Pain: Initial:  Not rated; only has pain when she walks Post Session:  Not rated   Medications Last Reviewed: 11/15/21. Lasix added PRN on 21. Updated Objective Findings:   L ASIS distal to R ASIS. TREATMENT:     Therapeutic Exercise: ( 40 min):  Exercises per grid below to improve mobility and strength. Required occasional verbal and tactile cues to promote proper body mechanics. Progressed resistance and repetitions as indicated. L hamstring stretches (3 x 20\") in supine and L hip flexor stretches (3 x 20\") in modified Hugh Test position to reduce L LE discomfort. Muscle-energy technique performed to address pelvic obliquity with apparent correction afterward.       Date:  21 Date:  21 Date:  21 Date  21 Date  11/15/21 Date  21 Date  21   Activity/Exercise Parameters Parameters Parameters       Lower trunk rotation x30 supine x30 supine  x30 supine x30 supine     B SLR 2x10  2x10   2x10 B     bridges 2x10 2x10 3 x 10 3x10 3x10 3 x 10 3 x 12   clamshells x20 side lying 2x10 B side lying 2 x 10 0#  Blue  2x 10 L 3x10 B in side lying 2x15 B side lying 3 x 10-12 ea  *Blue band used on R LE, no resistance to L LE 3 x 10-12 ea   *No resistance on L LE   Side lying hip abduction x20 B  3 x 5 isometric holds   -    Long arc quad x20 B         Sit to stand 3x10 from mat table 3x10 from mat table 3 x 10 from weight bench 3x10 from mat table with UE support 3x10 from mat table with UE support     Psoas stretch  Hook lying with opposite leg extended and pelvic tilt. 5 sec hold x 3 ea side. Then supine hook lying position with knees adducted to midline to release psoas, 1 min hold See above Side lying 30 sec hold x4 B with therapist assist 30 sec hold x 4 B LE with therapist assist in side lying See above    Standing hip extension with band  x10 B LE with UE support at mat table    Exagerrated steps when ambulating, 5 x 50' Exagerrated steps when ambulating, 5 x 50'    Standing  2 x 10 L       Supine hamstring stretch with strap  30 sec hold x 4 with therapist assist See above 30 sec hold x 4 B in side lying with therapist assist  See above    Supine piriformis stretch  30 sec hold x 4 B with therapist assist   30 sec hold x4 B -    Pelvic tilt  x15 with tactile cues 3 x 10 with tactile cues  x15 with tactile cues 3 x 10     + hip ADD x 30  + hip ABD x 30 3 x 10    + hip ADD x 30  + hip ABD x 30   Lateral walking   3 trials, ~20'  10 ft x 3 times      Standing marches    W/UE support  3 x 10 ea LE       Gait training    30 ft. Pt demonstrates increased trunk lean to R and decreased hip extension      Shuttle leg press      Bilateral  50# 3 x 10 Bilateral  50# 3 x 12                  HEP: No changes to HEP today.     Treatment/Session Summary:    · Response to Treatment:  Pt did better with exercises, with less discomfort with clamshells with L LE. Patient continues to exhibit SI joint instability with apparent pelvic obliquity. · Communication/Consultation:  None today  · Equipment provided today:  None today  · Recommendations/Intent for next treatment session: Next visit will focus on decreasing hip pain and increasing strength.     Total Treatment Billable Duration: 40 minutes  PT Patient Time In/Time Out  Time In: 9294  Time Out: 800 [facility], PT    Future Appointments   Date Time Provider Alesha Brumfield   12/2/2021  9:30 AM SFD DT ECHO [facility] SFD   1/11/2022  9:00 AM PP PFT LAB SSA PP PP   1/11/2022  9:30 AM GABE Bazzi SSA PP PP

## 2021-11-24 NOTE — THERAPY RECERTIFICATION
Darrian Liu  : 1945  Primary: Blake Reece Medicare o  Secondary:  [address]  at [facility] LIZ MENDIOLA  [address] [facility], [address] [place] [address],[address], [place] [address].  [phone]   [phone]         OUTPATIENT PHYSICAL THERAPY:Progress Report and and Recertification    ICD-10: Treatment Diagnosis:  Difficulty in walking, not elsewhere classified (R26.2); Pain in left hip (M25.552)  Precautions/Allergies:   Sulfa (sulfonamide antibiotics)   TREATMENT PLAN:  Effective Dates: 2021 to 2022  Frequency/Duration: 1-2 visits per week for 6 weeks  MEDICAL/REFERRING DIAGNOSIS:  Other intervertebral disc degeneration, lumbar region [M51.36]  Radiculopathy, lumbar region [M54.16]  Trochanteric bursitis, left hip [M70.62]   DATE OF ONSET: Approximately 1 month ago  REFERRING PHYSICIAN: GABE Vasquez MD Orders: Evaluate and treat, include modalities soft tissue mobilization and dry needling  Return MD Appointment:       RECERTIFICATION ASSESSMENT:  Ms. Liu presents with L hip/buttock pain of insidious origin. She has attended 7 PT appointments to address L hip and low back pain. She has made good progress with her activity tolerance and is able to lay on her L side now. She remains weak to L hip abductors and has pain with ambulating, and will likely benefit from continued PT to improve her ambulation, strength and activity tolerance. PROBLEM LIST (Impacting functional limitations):  1. Decreased Strength  2. Decreased ADL/Functional Activities  3. Decreased Ambulation Ability/Technique  4. Increased Pain  5. Decreased Activity Tolerance  6. Decreased Flexibility/Joint Mobility  7. Decreased [place] with Home Exercise Program INTERVENTIONS PLANNED: (Treatment may consist of any combination of the following)  1. Home Exercise Program (HEP)  2. Manual Therapy  3. Neuromuscular Re-education/Strengthening  4. Range of Motion (ROM)  5. Therapeutic Activites  6.  Therapeutic Exercise/Strengthening  7. Ultrasound (US)  8. Dry needling     GOALS: (Goals have been discussed and agreed upon with patient.)    Discharge Goals: Time Frame: 6 weeks     1. Patient will report 50% improvement in L hip pain. MET 11/24/21     2. Patient will have 5/5 L hip abduction strength. Ongoing, progressing 11/24/21     3. Patient will be able to lay on L side at night. MET 11/24/21     4. Patient will be independent with HEP.     5. (New goal 11-24-21) Patient will be able to walk without pain. OUTCOME MEASURE:   Tool Used: Modified Oswestry Low Back Pain Questionnaire  Score:  Initial: 8/50  Most Recent: X/50 (Date: -- )   Interpretation of Score: Each section is scored on a 0-5 scale, 5 representing the greatest disability. The scores of each section are added together for a total score of 50. Score 0 1-10 11-20 21-30 31-40 41-49 50   Modifier CH CI CJ CK CL CM CN     Tool Used: Lower Extremity Functional Scale (LEFS)  Score:  Initial: /80 (11/24/21) Most Recent: X/80 (Date: -- )   Interpretation of Score: 20 questions each scored on a 5 point scale with 0 representing \"extreme difficulty or unable to perform\" and 4 representing \"no difficulty\". The lower the score, the greater the functional disability. 80/80 represents no disability. Minimal detectable change is 9 points. Score 80 79-63 62-48 47-32 31-16 15-1 0   Modifier CH CI CJ CK CL CM CN       MEDICAL NECESSITY:   · Skilled intervention continues to be required due to L LE pain. .   · Patient has to negotiate stairs in her   REASON FOR SERVICES/OTHER COMMENTS:  · Patient continues to require skilled intervention due to L hip pain which impairs her mobility and quality of life. .    Rehabilitation Potential For Stated Goals: Good  Regarding Marvin Lowe's therapy, I certify that the treatment plan above will be carried out by a therapist or under their direction.   Thank you for this referral,    Radha Ambriz, PT       Referring Physician Signature: Davie Gaytan, PA _______________________________ Date _____________

## 2021-12-02 ENCOUNTER — HOSPITAL ENCOUNTER (OUTPATIENT)
Dept: NON INVASIVE DIAGNOSTICS | Age: 76
Discharge: HOME | End: 2021-12-02
Attending: INTERNAL MEDICINE
Payer: MEDICARE

## 2021-12-02 VITALS — WEIGHT: 142 LBS | BODY MASS INDEX: 25.16 KG/M2 | HEIGHT: 63 IN

## 2021-12-02 DIAGNOSIS — I27.20 PULMONARY HTN (HCC): ICD-10-CM

## 2021-12-02 LAB
ECHO AO ASC DIAM: 2.81 CM
ECHO AO ROOT DIAM: 2.58 CM
ECHO AV AREA PEAK VELOCITY: 2.53 CM2
ECHO AV AREA VTI: 2.32 CM2
ECHO AV AREA/BSA PEAK VELOCITY: 1.5 CM2/M2
ECHO AV AREA/BSA VTI: 1.4 CM2/M2
ECHO AV MEAN GRADIENT: 7 MMHG
ECHO AV PEAK GRADIENT: 14 MMHG
ECHO AV PEAK VELOCITY: 184 CM/S
ECHO AV VTI: 42.7 CM
ECHO EST RA PRESSURE: 3 MMHG
ECHO LA AREA 2C: 20.2 CM2
ECHO LA AREA 4C: 17.8 CM2
ECHO LA MAJOR AXIS: 5.89 CM
ECHO LA MINOR AXIS: 3.53 CM
ECHO LV E' LATERAL VELOCITY: 9.09 CM/S
ECHO LV E' SEPTAL VELOCITY: 6.38 CM/S
ECHO LV EDV A2C: 70.5 CM3
ECHO LV EDV A4C: 71.4 CM3
ECHO LV ESV A2C: 26.6 CM3
ECHO LV ESV A4C: 24.7 CM3
ECHO LV INTERNAL DIMENSION DIASTOLIC: 4.12 CM (ref 3.9–5.3)
ECHO LV INTERNAL DIMENSION SYSTOLIC: 2.62 CM
ECHO LV IVSD: 1.02 CM (ref 0.6–0.9)
ECHO LV MASS 2D: 131.3 G (ref 67–162)
ECHO LV MASS INDEX 2D: 78.6 G/M2 (ref 43–95)
ECHO LV POSTERIOR WALL DIASTOLIC: 0.96 CM (ref 0.6–0.9)
ECHO LVOT DIAM: 2.08 CM
ECHO LVOT PEAK GRADIENT: 8 MMHG
ECHO LVOT VTI: 29.1 CM
ECHO MV A VELOCITY: 113 CM/S
ECHO MV E DECELERATION TIME (DT): 239 MS
ECHO MV E VELOCITY: 84.2 CM/S
ECHO MV E/A RATIO: 0.75
ECHO MV E/E' LATERAL: 9.26
ECHO MV E/E' RATIO (AVERAGED): 11.23
ECHO MV E/E' SEPTAL: 13.2
ECHO RIGHT VENTRICULAR SYSTOLIC PRESSURE (RVSP): 55 MMHG
ECHO RV INTERNAL DIMENSION: 2.21 CM
ECHO RV TAPSE: 2.65 CM (ref 1.5–2)
ECHO TV REGURGITANT MAX VELOCITY: 360 CM/S
ECHO TV REGURGITANT PEAK GRADIENT: 52 MMHG

## 2021-12-02 PROCEDURE — 93306 TTE W/DOPPLER COMPLETE: CPT

## 2021-12-29 ENCOUNTER — HOSPITAL ENCOUNTER (OUTPATIENT)
Dept: MRI IMAGING | Age: 76
Discharge: HOME | End: 2021-12-29
Attending: PHYSICIAN ASSISTANT
Payer: MEDICARE

## 2021-12-29 DIAGNOSIS — M51.36 DDD (DEGENERATIVE DISC DISEASE), LUMBAR: ICD-10-CM

## 2021-12-29 DIAGNOSIS — M54.16 LUMBAR RADICULOPATHY: ICD-10-CM

## 2021-12-29 PROCEDURE — 72148 MRI LUMBAR SPINE W/O DYE: CPT

## 2022-02-17 ENCOUNTER — HOSPITAL ENCOUNTER (OUTPATIENT)
Dept: LAB | Age: 77
Discharge: HOME | End: 2022-02-17
Payer: MEDICARE

## 2022-02-17 DIAGNOSIS — I27.20 PULMONARY HYPERTENSION (HCC): ICD-10-CM

## 2022-02-17 DIAGNOSIS — J96.11 CHRONIC RESPIRATORY FAILURE WITH HYPOXIA (HCC): ICD-10-CM

## 2022-02-17 DIAGNOSIS — R60.9 EDEMA, UNSPECIFIED TYPE: ICD-10-CM

## 2022-02-17 DIAGNOSIS — D86.0 SARCOIDOSIS OF LUNG (HCC): ICD-10-CM

## 2022-02-17 LAB
ANION GAP SERPL CALC-SCNC: 1 MMOL/L (ref 7–16)
BNP SERPL-MCNC: 99 PG/ML
BUN SERPL-MCNC: 21 MG/DL (ref 8–23)
CALCIUM SERPL-MCNC: 9.6 MG/DL (ref 8.3–10.4)
CHLORIDE SERPL-SCNC: 101 MMOL/L (ref 98–107)
CO2 SERPL-SCNC: 39 MMOL/L (ref 21–32)
CREAT SERPL-MCNC: 0.6 MG/DL (ref 0.6–1)
GLUCOSE SERPL-MCNC: 100 MG/DL (ref 65–100)
POTASSIUM SERPL-SCNC: 4 MMOL/L (ref 3.5–5.1)
SODIUM SERPL-SCNC: 141 MMOL/L (ref 136–145)

## 2022-02-17 PROCEDURE — 83880 ASSAY OF NATRIURETIC PEPTIDE: CPT

## 2022-02-17 PROCEDURE — 36415 COLL VENOUS BLD VENIPUNCTURE: CPT

## 2022-02-17 PROCEDURE — 80048 BASIC METABOLIC PNL TOTAL CA: CPT

## 2022-03-14 ENCOUNTER — HOSPITAL ENCOUNTER (OUTPATIENT)
Dept: LAB | Age: 77
Discharge: HOME | End: 2022-03-14
Payer: MEDICARE

## 2022-03-14 DIAGNOSIS — J96.11 CHRONIC RESPIRATORY FAILURE WITH HYPOXIA (HCC): ICD-10-CM

## 2022-03-14 DIAGNOSIS — R60.0 LOWER EXTREMITY EDEMA: ICD-10-CM

## 2022-03-14 DIAGNOSIS — R06.09 DYSPNEA ON EXERTION: ICD-10-CM

## 2022-03-14 DIAGNOSIS — I27.20 PULMONARY HYPERTENSION (HCC): ICD-10-CM

## 2022-03-14 LAB
ANION GAP SERPL CALC-SCNC: ABNORMAL MMOL/L (ref 7–16)
BNP SERPL-MCNC: 80 PG/ML
BUN SERPL-MCNC: 15 MG/DL (ref 8–23)
CALCIUM SERPL-MCNC: 9.2 MG/DL (ref 8.3–10.4)
CHLORIDE SERPL-SCNC: 100 MMOL/L (ref 98–107)
CO2 SERPL-SCNC: 42 MMOL/L (ref 21–32)
CREAT SERPL-MCNC: 0.7 MG/DL (ref 0.6–1)
GLUCOSE SERPL-MCNC: 117 MG/DL (ref 65–100)
POTASSIUM SERPL-SCNC: 3.9 MMOL/L (ref 3.5–5.1)
SODIUM SERPL-SCNC: 141 MMOL/L (ref 136–145)

## 2022-03-14 PROCEDURE — 80048 BASIC METABOLIC PNL TOTAL CA: CPT

## 2022-03-14 PROCEDURE — 36415 COLL VENOUS BLD VENIPUNCTURE: CPT

## 2022-03-14 PROCEDURE — 83880 ASSAY OF NATRIURETIC PEPTIDE: CPT

## 2022-03-19 PROBLEM — I27.20 PULMONARY HYPERTENSION (HCC): Status: ACTIVE | Noted: 2019-01-10

## 2022-03-19 PROBLEM — Z22.39 PSEUDOMONAS AERUGINOSA COLONIZATION: Status: ACTIVE | Noted: 2019-01-10

## 2022-03-19 PROBLEM — J84.9 INTERSTITIAL LUNG DISEASE (HCC): Status: ACTIVE | Noted: 2018-12-27

## 2022-03-19 PROBLEM — J96.11 CHRONIC RESPIRATORY FAILURE WITH HYPOXIA (HCC): Status: ACTIVE | Noted: 2019-01-10

## 2022-03-19 PROBLEM — J47.9 BRONCHIECTASIS WITHOUT COMPLICATION (HCC): Status: ACTIVE | Noted: 2019-01-10

## 2022-03-20 PROBLEM — E11.9 CONTROLLED TYPE 2 DIABETES MELLITUS WITHOUT COMPLICATION, WITHOUT LONG-TERM CURRENT USE OF INSULIN (HCC): Status: ACTIVE | Noted: 2018-12-27

## 2022-05-19 NOTE — PROGRESS NOTES
Darrian Uribe  : 1945  Primary: Blake Reece Medicare Hmo  Secondary:  [address] at [facility] LIZ MENDIOLA  [address] [facility], [address] [place] [address],[address], [place] [address].  [phone]   [phone]         OUTPATIENT PHYSICAL THERAPY:Discontinuation Summary    ICD-10: Treatment Diagnosis:  Difficulty in walking, not elsewhere classified (R26.2); Pain in left hip (M25.552)  Precautions/Allergies:   Sulfa (sulfonamide antibiotics)    MEDICAL/REFERRING DIAGNOSIS:  Other intervertebral disc degeneration, lumbar region [M51.36]  Radiculopathy, lumbar region [M54.16]  Trochanteric bursitis, left hip [M70.62]   DATE OF ONSET: Approximately 1 month ago  REFERRING PHYSICIAN: GABE Vasquez MD Orders: Evaluate and treat, include modalities soft tissue mobilization and dry needling  Return MD Appointment: 2021    Reny Evangelista has been seen in physical therapy from 10/22/21 to 21 for 8 visits. Treatment has been discontinued at this time due to patient failing to return for additional treatment. Two of the patient's goals were met prior to discontinuation, per below. Thank you for this referral.          GOALS: (Goals have been discussed and agreed upon with patient.)    Discharge Goals:      1. Patient will report 50% improvement in L hip pain. MET 21     2. Patient will have 5/5 L hip abduction strength. Ongoing, progressing 21     3. Patient will be able to lay on L side at night. MET 21     4. Patient will be independent with HEP.     5. (New goal 21) Patient will be able to walk without pain. OUTCOME MEASURE:   Tool Used: Modified Oswestry Low Back Pain Questionnaire  Score:  Initial:   Most Recent:  (Date: -- )   Interpretation of Score: Each section is scored on a 0-5 scale, 5 representing the greatest disability. The scores of each section are added together for a total score of 50.     Score 0 1-10 11-20 21-30 31-40 41-49 50   Modifier CH CI CJ CK CL CM CN     Tool Used: Lower Extremity Functional Scale (LEFS)  Score:  Initial: /80 (11/24/21) Most Recent: X/80 (Date: -- )   Interpretation of Score: 20 questions each scored on a 5 point scale with 0 representing \"extreme difficulty or unable to perform\" and 4 representing \"no difficulty\". The lower the score, the greater the functional disability. 80/80 represents no disability. Minimal detectable change is 9 points.   Score 80 79-63 62-48 47-32 31-16 15-1 0   Modifier CH CI CJ CK CL CM CN     Patrick Vasquez, PT  5/19/2022

## 2022-05-23 ENCOUNTER — TELEPHONE (OUTPATIENT)
Dept: PULMONOLOGY | Age: 77
End: 2022-05-23

## 2022-05-23 RX ORDER — DOXYCYCLINE HYCLATE 100 MG
100 TABLET ORAL 2 TIMES DAILY
Qty: 20 TABLET | Refills: 0 | Status: SHIPPED | OUTPATIENT
Start: 2022-05-23 | End: 2022-06-02

## 2022-05-23 NOTE — TELEPHONE ENCOUNTER
LOV 5/17/22 with Colette Bower---- Sarcoidosis, pHTN, chronic resp failure     Allergies: Sulfa    Pt c/o having a reaction to her antibiotic. She was placed on Levaquin 500mg [id number] for a lower respiratory infection. She states she took the first dose on Wednesday and then the reaction happened on Saturday. She stopped the medication on Saturday. Her sputum has gone from green to brown. No wheezing or SOB more than normal. Continues her diuretic and pHTN meds. Pt states that she has never had a reaction to antibiotics, just sulfa. She would like to know if she needs to continue on with a different antibiotic.

## 2022-05-23 NOTE — TELEPHONE ENCOUNTER
What reaction exactly,? And she can switch to doxycycline 100 mg p.o. twice daily for the same duration.     Fiona Acosta MD.

## 2022-05-23 NOTE — TELEPHONE ENCOUNTER
Patient is having a reaction to :  Levofloxacin. Headache. Muscle cramps and pain.   Swelling in the knees

## 2022-05-24 ENCOUNTER — HOSPITAL ENCOUNTER (OUTPATIENT)
Dept: CT IMAGING | Age: 77
Discharge: HOME | End: 2022-05-27
Payer: MEDICARE

## 2022-05-24 DIAGNOSIS — J22 LOWER RESPIRATORY INFECTION (E.G., BRONCHITIS, PNEUMONIA, PNEUMONITIS, PULMONITIS): ICD-10-CM

## 2022-05-24 PROCEDURE — 71250 CT THORAX DX C-: CPT

## 2022-06-10 ENCOUNTER — TELEPHONE (OUTPATIENT)
Dept: SLEEP MEDICINE | Age: 77
End: 2022-06-10

## 2022-06-24 ENCOUNTER — TELEPHONE (OUTPATIENT)
Dept: PULMONOLOGY | Age: 77
End: 2022-06-24

## 2022-06-24 ENCOUNTER — OFFICE VISIT (OUTPATIENT)
Dept: PULMONOLOGY | Age: 77
End: 2022-06-24
Payer: MEDICARE

## 2022-06-24 VITALS
HEART RATE: 73 BPM | WEIGHT: 156 LBS | HEIGHT: 63 IN | OXYGEN SATURATION: 100 % | SYSTOLIC BLOOD PRESSURE: 96 MMHG | DIASTOLIC BLOOD PRESSURE: 64 MMHG | TEMPERATURE: 97.5 F | BODY MASS INDEX: 27.64 KG/M2

## 2022-06-24 DIAGNOSIS — J84.10 FIBROSIS OF LUNG (HCC): ICD-10-CM

## 2022-06-24 DIAGNOSIS — J96.11 CHRONIC RESPIRATORY FAILURE WITH HYPOXIA (HCC): ICD-10-CM

## 2022-06-24 DIAGNOSIS — J47.9 BRONCHIECTASIS WITHOUT COMPLICATION (HCC): ICD-10-CM

## 2022-06-24 DIAGNOSIS — I27.20 PULMONARY HYPERTENSION (HCC): Primary | ICD-10-CM

## 2022-06-24 DIAGNOSIS — D86.9 SARCOIDOSIS: ICD-10-CM

## 2022-06-24 DIAGNOSIS — I27.20 PULMONARY HYPERTENSION (HCC): ICD-10-CM

## 2022-06-24 PROCEDURE — 1090F PRES/ABSN URINE INCON ASSESS: CPT | Performed by: INTERNAL MEDICINE

## 2022-06-24 PROCEDURE — 1036F TOBACCO NON-USER: CPT | Performed by: INTERNAL MEDICINE

## 2022-06-24 PROCEDURE — 99215 OFFICE O/P EST HI 40 MIN: CPT | Performed by: INTERNAL MEDICINE

## 2022-06-24 PROCEDURE — G8417 CALC BMI ABV UP PARAM F/U: HCPCS | Performed by: INTERNAL MEDICINE

## 2022-06-24 PROCEDURE — 1123F ACP DISCUSS/DSCN MKR DOCD: CPT | Performed by: INTERNAL MEDICINE

## 2022-06-24 PROCEDURE — G8427 DOCREV CUR MEDS BY ELIG CLIN: HCPCS | Performed by: INTERNAL MEDICINE

## 2022-06-24 PROCEDURE — G8400 PT W/DXA NO RESULTS DOC: HCPCS | Performed by: INTERNAL MEDICINE

## 2022-06-24 RX ORDER — TORSEMIDE 10 MG/1
100 TABLET ORAL DAILY
Qty: 90 TABLET | Refills: 3 | Status: SHIPPED | OUTPATIENT
Start: 2022-06-24 | End: 2022-09-22

## 2022-06-24 RX ORDER — TORSEMIDE 10 MG/1
100 TABLET ORAL DAILY
Qty: 90 TABLET | Refills: 3 | Status: SHIPPED | OUTPATIENT
Start: 2022-06-24 | End: 2022-06-24 | Stop reason: ALTCHOICE

## 2022-06-24 ASSESSMENT — ENCOUNTER SYMPTOMS
CHEST TIGHTNESS: 0
VOMITING: 0
NAUSEA: 0
SHORTNESS OF BREATH: 1
ABDOMINAL PAIN: 0
APNEA: 0
WHEEZING: 1
CONSTIPATION: 0
COUGH: 1
DIARRHEA: 0

## 2022-06-24 NOTE — Clinical Note
Hey please arrange cpft in addition to 6MWT for Mrs. Mosquera if possible before her follow up atppointment. Thanks!

## 2022-06-24 NOTE — PATIENT INSTRUCTIONS
1.  I would really like to get you transitioned from Tyvaso 12 puff inhaler to a 1 puff smaller inhaler. 2.  We will transition from sildenafil to a once daily medication called tadalafil. You should continue the sildenafil until you receive tadalafil. Then you can change over immediately. If the medicine hasn't arrived by 7/1, then call Lise Colon at our office to find out whats going on.  [phone] and ask for Real Mullins. 3. I'd like you to commit to CPAP for at least one month to see if your fatigue improves at all. 4.  Cut the torsemide in half and take 10mg a day. Monitor your weight and if its going up again, the resume 20mg. Check labs today. 5.  Will have pulmonary rehab folks call you to inquire about their maintenance program and the cost of joining as a gym.

## 2022-06-24 NOTE — PROGRESS NOTES
Dr. Audrey Maynard MD  [address] [facility] [place]. [address] [facility], [address] [facility]  [phone]    Patient Name:  Lavon Tomas  YOB: 1945  Office Visit: 6/24/2022    ASSESSMENT AND PLAN:  (Medical Decision Making)  Lavon Tomas is a 68 y.o. female with isolated precapillary pulmonary hypertension on Tyvaso 12puffs qid, sildenafil 20mg tid, and torsemide 20mg daily. She is having more fatigue. CT of chest was reviewed and does not show worsening of fibrotic changes from sarcoidosis though a mediastinal LN is more prominent. 1. Pulmonary hypertension ([address]  Would like to transition from tyvaso inhaler system to DPI inhaler. Will coordinate this with the help of Salbador Grace. Additionally, plan to change from sildenafil to tadalafil 40mg daily. Plan to reduce torsemide to 10mg daily and obtain BMP today. She would like to participate in pulmonary rehab if possible, and will place referral so they can discuss programs available. Orders:  -     Basic Metabolic Panel; Future  -     Ambulatory referral to Pulmonary Rehab  -     torsemide (DEMADEX) 10 MG tablet; Take 10 tablets by mouth daily, Disp-90 tablet, R-3Fill at time of next refill for torsemide. Currently has a full supply. Normal  2. Sarcoidosis   Will repeat cpft, though CT is stable. Labs not suggestive of hypercalcemia or renal/liver dysfunction  3. Fibrosis of lung ([address]): chronic and unchanged on most recent CT. 4. Chronic respiratory failure with hypoxia ([address]): This patient has been awaiting a portable oxygen concentrator from [place] [id number] for an extended period of time. We will arrange with [employer] if [facility] can't provide. Minh Epps MD  Electronically signed    Clinical time for encounter was 48 minutes.   _____________________________________________________________________________________________________________________    Reason for Visit:  Other (PHTN)      History of Present Illness:     Lavon Tomas is a 71yoF with h/o fibronodular sarcoidosis (FVC 56%; bx of neck cervical node in 20s), prior hemoptysis from the lingular segment with embolization by Dr. Markos Calix on 1/6/21 (left bronchial artery was hypertrophied), severe precapillary pulmonary hypertension (mPAP 39, PVR 8.8). Symptoms of lower extremity edema occurred after embolization prompted evaluation for PH. The LVEDP and PCWP were low, ruling out PH due to left heart disease. She is now on 3lpm of oxygen all the time. No prior use of anorexigens, no chemo drugs or other medications known to cause PH . Worked as an  without occupational exposures. She is a never smoker. Her V/Q scan did not suggest CTEPH. She has mild sleep apnea and is supposed to be on CPAP but hasn't been using. Today her swelling is improved but she complains of just tiredness and fatigue. This is new since December. The only real medication change has been the addition of torsemide 20mg for edema at last visit. PH medications  She uses 12puffs qid of tyvaso. sildenafil 20mg tid. Still needs portable oxygen concentrator. [facility] [employer] says still on backorder. Weight is 157lb (same as last visit), but her edema is resolved and she thinks she has gained weight due to sedentary state. Tobacco Use: Low Risk     Smoking Tobacco Use: Never Smoker    Smokeless Tobacco Use: Never Used       Review of Systems:  Review of Systems   Constitutional: Positive for fatigue. Negative for chills, fever and unexpected weight change. Respiratory: Positive for cough, shortness of breath and wheezing. Negative for apnea and chest tightness. Cardiovascular: Negative for chest pain, palpitations and leg swelling. Gastrointestinal: Negative for abdominal pain, constipation, diarrhea, nausea and vomiting. Neurological: Negative for dizziness, tremors, seizures, weakness and headaches.         Physical exam:    Vitals:    06/24/22 0823   BP: 96/64   Pulse: 73 Temp: 97.5 °F (36.4 °C)   SpO2: 100%     Body mass index is 27.63 kg/m². General Appearance: The patient is pleasant and in no respiratory distress. HEENT: PERRLA. Conjunctivae unremarkable. Nasal mucosa is without epistaxis, exudate, or polyps. Neck/Lymphatic:  Symmetrical with no elevation of jugular venous pulsation. Trachea midline. No thyroid enlargement. No cervical adenopathy. Lungs:  Normal respiratory effort with symmetrical lung expansion. Breath sounds with few crackles. Heart:  RRR  Abdomen:  Soft and non-tender. No hepatosplenomegaly. Bowel sounds are normal.    Extremity:  No edema, clubbing or cyanosis  Neuro: The patient is alert and oriented to person, place, and time. Memory appears intact and mood is normal.  No gross sensorimotor deficits are present. DIAGNOSTIC TESTS:                                                                                                             Spirometry:  Moderate restriction with severely reduced diffusing capacity    01/10/2019  5/6/2021   FVC 1.12-(50%)  1.02 (43%)   FEV1 0.96-(56%)  0.85 (47%)   FEV1/FVC 0.86  0.83   TLC    2.54 (55%)   FRC       RV       DLCO    8.3 (41%)        6MWT 10/8/18 04/29/2021 09/20/2021   distance 365m 323m 336m   Start;end O2 sat 96%;87% 98;91 100%;94%   Max CIERA dyspnea 0 3 2;10   COMMENTS   Pt walked with poc at 3lpm Pt walked with 3lpm         Right Heart Cath  4/5/2021   RA 6   RV 65/6   PA 75/21 (mPAP 39)   PCWP 7   CO 3.6   PVR 8.8   LVEDP 5      Negative vasoreactivity test 4/5/21  Adenosine PA (mPAP) CO   50 74/23 (40) 4.0   100 75/21  (39)  4.2   150 66/18 (34)  3.9   200 73/18 (36)  4.5      TTE  3/16/2021 12/2/21    LV  EF 60 to 65%; indeterminate diastolic function  EF 60 to 65%    RV  mildly dilated, normal systolic function  normal cavity size and global systolic function    Peak TRV  4.3m/s  3.6 m/s    RVSP  75  55 mmHg    COMMENTS  normal valves            Oximetry and/or sleep study result:      Chest CT 1/5/21:  IMPRESSION: Bronchiectasis and fibrosis in both lungs consistent with history of sarcoidosis.       V/Q Scan 4/27/21:   1. Low probability for pulmonary embolism. 2. Multifocal matched perfusion defects, likely consequent to the patient's  known chronic sarcoidosis and multifocal chronic fibrotic changes demonstrated  on recent chest CT.     Lab Diagnostics:  HIV neg  HCV neg  Direct DARRIUS positive:  AntiRNP ab 3.7 (slightly elevated). AntidsDNA neg  NT pro- on 3/12/21  LFTs normal     AMBULATING OXIMETRY: 9/20/2021 O2 sat HR   Room air at Rest  97%  69 bpm   Room air ambulating  83%  73 bpm   (recovery) Ambulating on 2 Lpm  94%  59 bpm        Sulfa antibiotics  Current Outpatient Medications   Medication Sig    torsemide (DEMADEX) 10 MG tablet Take 10 tablets by mouth daily    albuterol (PROVENTIL) (2.5 MG/3ML) 0.083% nebulizer solution Inhale 2.5 mg into the lungs 3 times daily    brimonidine-timolol (COMBIGAN) 0.2-0.5 % ophthalmic solution 1 drop every 12 hours    Calcium Carbonate-Vitamin D (CALCIUM-VITAMIN D) 600-125 MG-UNIT TABS Take by mouth    Cholecalciferol 50 MCG (2000 UT) TABS Take 2,000 Units by mouth daily    potassium chloride (KLOR-CON M) 20 MEQ extended release tablet Take 20 mEq by mouth daily    pyridoxine (B-6) 100 MG tablet Take 100 mg by mouth    Treprostinil (TYVASO) 0.6 MG/ML SOLN Inhale into the lungs     No current facility-administered medications for this visit.      Past Medical History:   Diagnosis Date    Arthritis     osteo    Glaucoma     Ill-defined condition     Pulmonary HTN    Pulmonary hypertension (HCC)     Sarcoidosis     Sarc[facility]osis of lung ([facility]      Family History   Problem Relation Age of Onset    Hypertension Sister     Elevated Lipids Father     Hypertension Sister     Diabetes Maternal Grandmother     Diabetes Mother     Hypertension Mother     Diabetes Maternal Grandfather     Cancer Mother     Breast Cancer Mother 79    High Cholesterol Sister     High Cholesterol Mother

## 2022-06-26 LAB
ANION GAP SERPL CALC-SCNC: 3 MMOL/L (ref 7–16)
BUN SERPL-MCNC: 24 MG/DL (ref 8–23)
CALCIUM SERPL-MCNC: 10.1 MG/DL (ref 8.3–10.4)
CHLORIDE SERPL-SCNC: 96 MMOL/L (ref 98–107)
CO2 SERPL-SCNC: 40 MMOL/L (ref 21–32)
CREAT SERPL-MCNC: 0.83 MG/DL (ref 0.6–1)
GLUCOSE SERPL-MCNC: 85 MG/DL (ref 65–100)
POTASSIUM SERPL-SCNC: 3.8 MMOL/L (ref 3.5–5.1)
SODIUM SERPL-SCNC: 139 MMOL/L (ref 136–145)

## 2022-07-25 ENCOUNTER — HOSPITAL ENCOUNTER (OUTPATIENT)
Dept: CARDIAC REHAB | Age: 77
Setting detail: RECURRING SERIES
Discharge: HOME | End: 2022-07-28
Payer: MEDICARE

## 2022-07-25 ASSESSMENT — PULMONARY FUNCTION TESTS
FEV1/FVC: 0.83
FEV1 (%PREDICTED): 47
FVC (LITERS): 1.02

## 2022-07-25 ASSESSMENT — LIFESTYLE VARIABLES: SMOKELESS_TOBACCO: NO

## 2022-07-25 NOTE — CARDIO/PULMONARY
2022      Dear Dr. Lori Lopez    Thank you for referring your patient, Zulma Mosquera (:1945 ), to the Pulmonary Rehabilitation Program at [address] [facility]. Mrs. Mosquera is a good candidate for the program and should see improvements with regular participation. We will be working to increase your patient's endurance and self care over the next 12 weeks. We will contact you with any issues or concerns that may arise, or you can follow your patient's progress through [address] [place] [address] at any time. We will send you a final summary report when the program is completed. Again, thank you for your referral. If we can be of further assistance, please feel free to contact the Cardiopulmonary Rehab staff at [phone].     Sincerely,      Ellen Vidal, RRT,   Pulmonary Rehab Specialist   [facility]    CC: File

## 2022-07-28 ENCOUNTER — HOSPITAL ENCOUNTER (OUTPATIENT)
Dept: CARDIAC REHAB | Age: 77
Setting detail: RECURRING SERIES
Discharge: HOME | End: 2022-07-31
Payer: MEDICARE

## 2022-07-28 VITALS — OXYGEN SATURATION: 100 % | BODY MASS INDEX: 27.64 KG/M2 | HEIGHT: 63 IN | WEIGHT: 156 LBS

## 2022-07-28 PROCEDURE — 94625 PHY/QHP OP PULM RHB W/O MNTR: CPT

## 2022-07-28 ASSESSMENT — EXERCISE STRESS TEST
PEAK_BP: 138/68
PEAK_METS: 2.4
PEAK_HR: 97
PEAK_RPE: 11
PEAK_HR: 97
PEAK_METS: 1.625
PEAK_HR: 97
PEAK_BP: 138/68
PEAK_RPE: 11

## 2022-07-28 ASSESSMENT — PULMONARY FUNCTION TESTS
FEV1/FVC: 0.83
FEV1 (%PREDICTED): 47
FVC (LITERS): 1.02

## 2022-07-28 ASSESSMENT — LIFESTYLE VARIABLES: SMOKELESS_TOBACCO: NO

## 2022-08-01 ENCOUNTER — HOSPITAL ENCOUNTER (OUTPATIENT)
Dept: CARDIAC REHAB | Age: 77
Setting detail: RECURRING SERIES
Discharge: HOME | End: 2022-08-04
Payer: MEDICARE

## 2022-08-01 VITALS — BODY MASS INDEX: 27.99 KG/M2 | WEIGHT: 158 LBS

## 2022-08-01 PROCEDURE — G0239 OTH RESP PROC, GROUP: HCPCS

## 2022-08-01 ASSESSMENT — EXERCISE STRESS TEST
PEAK_HR: 88
PEAK_BP: 120/80
PEAK_METS: 2.4

## 2022-08-03 ENCOUNTER — HOSPITAL ENCOUNTER (OUTPATIENT)
Dept: CARDIAC REHAB | Age: 77
Setting detail: RECURRING SERIES
Discharge: HOME | End: 2022-08-06
Payer: MEDICARE

## 2022-08-03 PROCEDURE — G0239 OTH RESP PROC, GROUP: HCPCS

## 2022-08-03 ASSESSMENT — EXERCISE STRESS TEST
PEAK_BP: 126/76
PEAK_METS: 2.4
PEAK_HR: 91

## 2022-08-05 ENCOUNTER — TELEPHONE (OUTPATIENT)
Dept: PULMONOLOGY | Age: 77
End: 2022-08-05

## 2022-08-08 ENCOUNTER — HOSPITAL ENCOUNTER (OUTPATIENT)
Dept: CARDIAC REHAB | Age: 77
Setting detail: RECURRING SERIES
Discharge: HOME | End: 2022-08-11
Payer: MEDICARE

## 2022-08-08 VITALS — WEIGHT: 154.8 LBS | BODY MASS INDEX: 27.42 KG/M2

## 2022-08-08 PROCEDURE — G0239 OTH RESP PROC, GROUP: HCPCS

## 2022-08-08 ASSESSMENT — EXERCISE STRESS TEST
PEAK_BP: 140/70
PEAK_METS: 2.4
PEAK_HR: 96

## 2022-08-08 NOTE — TELEPHONE ENCOUNTER
Talked to pt she gave me a contact for [employer]. Benita Nj [phone]     Arianna Short left him a vm to call me  back.

## 2022-08-10 ENCOUNTER — HOSPITAL ENCOUNTER (OUTPATIENT)
Dept: CARDIAC REHAB | Age: 77
Setting detail: RECURRING SERIES
Discharge: HOME | End: 2022-08-13
Payer: MEDICARE

## 2022-08-10 PROCEDURE — G0239 OTH RESP PROC, GROUP: HCPCS

## 2022-08-10 ASSESSMENT — EXERCISE STRESS TEST
PEAK_METS: 2.2
PEAK_HR: 90
PEAK_BP: 118/68

## 2022-08-11 ENCOUNTER — TELEPHONE (OUTPATIENT)
Dept: PULMONOLOGY | Age: 77
End: 2022-08-11

## 2022-08-15 ENCOUNTER — APPOINTMENT (OUTPATIENT)
Dept: CARDIAC REHAB | Age: 77
End: 2022-08-15
Payer: MEDICARE

## 2022-08-16 ENCOUNTER — HOSPITAL ENCOUNTER (OUTPATIENT)
Dept: CARDIAC REHAB | Age: 77
Setting detail: RECURRING SERIES
Discharge: HOME | End: 2022-08-19
Payer: MEDICARE

## 2022-08-16 VITALS — WEIGHT: 156.2 LBS | BODY MASS INDEX: 27.67 KG/M2

## 2022-08-16 PROCEDURE — G0239 OTH RESP PROC, GROUP: HCPCS

## 2022-08-16 ASSESSMENT — EXERCISE STRESS TEST
PEAK_METS: 2.4
PEAK_BP: 114/68
PEAK_HR: 91

## 2022-08-17 ENCOUNTER — APPOINTMENT (OUTPATIENT)
Dept: CARDIAC REHAB | Age: 77
End: 2022-08-17
Payer: MEDICARE

## 2022-08-18 ENCOUNTER — HOSPITAL ENCOUNTER (OUTPATIENT)
Dept: CARDIAC REHAB | Age: 77
Setting detail: RECURRING SERIES
Discharge: HOME | End: 2022-08-21
Payer: MEDICARE

## 2022-08-18 PROCEDURE — G0239 OTH RESP PROC, GROUP: HCPCS

## 2022-08-18 ASSESSMENT — EXERCISE STRESS TEST
PEAK_BP: 116/60
PEAK_METS: 2.4
PEAK_HR: 89

## 2022-08-22 ENCOUNTER — APPOINTMENT (OUTPATIENT)
Dept: CARDIAC REHAB | Age: 77
End: 2022-08-22
Payer: MEDICARE

## 2022-08-23 ENCOUNTER — HOSPITAL ENCOUNTER (OUTPATIENT)
Dept: CARDIAC REHAB | Age: 77
Setting detail: RECURRING SERIES
Discharge: HOME | End: 2022-08-26
Payer: MEDICARE

## 2022-08-23 VITALS — WEIGHT: 156.2 LBS | BODY MASS INDEX: 27.67 KG/M2

## 2022-08-23 PROCEDURE — G0239 OTH RESP PROC, GROUP: HCPCS

## 2022-08-23 ASSESSMENT — EXERCISE STRESS TEST
PEAK_HR: 91
PEAK_METS: 2.4
PEAK_BP: 116/74

## 2022-08-24 ENCOUNTER — APPOINTMENT (OUTPATIENT)
Dept: CARDIAC REHAB | Age: 77
End: 2022-08-24
Payer: MEDICARE

## 2022-08-24 ASSESSMENT — PULMONARY FUNCTION TESTS
FEV1/FVC: 0.83
FVC (LITERS): 1.02
FEV1 (%PREDICTED): 47

## 2022-08-24 ASSESSMENT — LIFESTYLE VARIABLES: SMOKELESS_TOBACCO: NO

## 2022-08-24 ASSESSMENT — EXERCISE STRESS TEST: PEAK_BP: 118/66

## 2022-08-25 ENCOUNTER — APPOINTMENT (OUTPATIENT)
Dept: CARDIAC REHAB | Age: 77
End: 2022-08-25
Payer: MEDICARE

## 2022-08-29 ENCOUNTER — APPOINTMENT (OUTPATIENT)
Dept: CARDIAC REHAB | Age: 77
End: 2022-08-29
Payer: MEDICARE

## 2022-08-30 ENCOUNTER — HOSPITAL ENCOUNTER (OUTPATIENT)
Dept: CARDIAC REHAB | Age: 77
Setting detail: RECURRING SERIES
Discharge: HOME | End: 2022-09-02
Payer: MEDICARE

## 2022-08-30 VITALS — WEIGHT: 156 LBS | BODY MASS INDEX: 27.63 KG/M2

## 2022-08-30 PROCEDURE — 93798 PHYS/QHP OP CAR RHAB W/ECG: CPT

## 2022-08-30 PROCEDURE — G0239 OTH RESP PROC, GROUP: HCPCS

## 2022-08-30 ASSESSMENT — EXERCISE STRESS TEST
PEAK_BP: 130/64
PEAK_HR: 88
PEAK_METS: 2.4

## 2022-08-31 ENCOUNTER — APPOINTMENT (OUTPATIENT)
Dept: CARDIAC REHAB | Age: 77
End: 2022-08-31
Payer: MEDICARE

## 2022-09-01 ENCOUNTER — HOSPITAL ENCOUNTER (OUTPATIENT)
Dept: CARDIAC REHAB | Age: 77
Setting detail: RECURRING SERIES
Discharge: HOME | End: 2022-09-04
Payer: MEDICARE

## 2022-09-01 PROCEDURE — G0239 OTH RESP PROC, GROUP: HCPCS

## 2022-09-01 ASSESSMENT — EXERCISE STRESS TEST
PEAK_METS: 2.3
PEAK_BP: 104/54
PEAK_HR: 89

## 2022-09-06 ENCOUNTER — HOSPITAL ENCOUNTER (OUTPATIENT)
Dept: CARDIAC REHAB | Age: 77
Setting detail: RECURRING SERIES
Discharge: HOME | End: 2022-09-09
Payer: MEDICARE

## 2022-09-06 VITALS — WEIGHT: 154.8 LBS | BODY MASS INDEX: 27.42 KG/M2

## 2022-09-06 PROCEDURE — G0239 OTH RESP PROC, GROUP: HCPCS

## 2022-09-06 ASSESSMENT — EXERCISE STRESS TEST
PEAK_BP: 148/76
PEAK_METS: 2.3
PEAK_HR: 90

## 2022-09-07 ENCOUNTER — APPOINTMENT (OUTPATIENT)
Dept: CARDIAC REHAB | Age: 77
End: 2022-09-07
Payer: MEDICARE

## 2022-09-08 ENCOUNTER — NURSE ONLY (OUTPATIENT)
Dept: PULMONOLOGY | Age: 77
End: 2022-09-08
Payer: MEDICARE

## 2022-09-08 DIAGNOSIS — R06.00 DYSPNEA, UNSPECIFIED TYPE: Primary | ICD-10-CM

## 2022-09-08 DIAGNOSIS — I27.20 PULMONARY HYPERTENSION (HCC): Primary | ICD-10-CM

## 2022-09-08 DIAGNOSIS — D86.9 SARCOIDOSIS: ICD-10-CM

## 2022-09-08 DIAGNOSIS — I27.20 PULMONARY HYPERTENSION (HCC): ICD-10-CM

## 2022-09-08 LAB
FEF25-27, POC: 0.82 L/S
FET, POC: NORMAL
FEV 1 , POC: 0.84 L
FEV1/FVC, POC: NORMAL
FVC, POC: NORMAL
LUNG AGE, POC: NORMAL
PEF, POC: 4.42 L/S

## 2022-09-08 PROCEDURE — 94618 PULMONARY STRESS TESTING: CPT | Performed by: INTERNAL MEDICINE

## 2022-09-08 PROCEDURE — 94729 DIFFUSING CAPACITY: CPT | Performed by: INTERNAL MEDICINE

## 2022-09-08 PROCEDURE — 94726 PLETHYSMOGRAPHY LUNG VOLUMES: CPT | Performed by: INTERNAL MEDICINE

## 2022-09-08 PROCEDURE — 94010 BREATHING CAPACITY TEST: CPT | Performed by: INTERNAL MEDICINE

## 2022-09-12 ENCOUNTER — APPOINTMENT (OUTPATIENT)
Dept: CARDIAC REHAB | Age: 77
End: 2022-09-12
Payer: MEDICARE

## 2022-09-13 ENCOUNTER — HOSPITAL ENCOUNTER (OUTPATIENT)
Dept: CARDIAC REHAB | Age: 77
Setting detail: RECURRING SERIES
Discharge: HOME | End: 2022-09-16
Payer: MEDICARE

## 2022-09-13 VITALS — BODY MASS INDEX: 27.85 KG/M2 | WEIGHT: 157.2 LBS

## 2022-09-13 PROCEDURE — G0239 OTH RESP PROC, GROUP: HCPCS

## 2022-09-13 ASSESSMENT — EXERCISE STRESS TEST
PEAK_BP: 120/70
PEAK_METS: 2.4
PEAK_HR: 93

## 2022-09-14 ENCOUNTER — APPOINTMENT (OUTPATIENT)
Dept: CARDIAC REHAB | Age: 77
End: 2022-09-14
Payer: MEDICARE

## 2022-09-15 ENCOUNTER — HOSPITAL ENCOUNTER (OUTPATIENT)
Dept: CARDIAC REHAB | Age: 77
Setting detail: RECURRING SERIES
Discharge: HOME | End: 2022-09-18
Payer: MEDICARE

## 2022-09-15 PROCEDURE — G0239 OTH RESP PROC, GROUP: HCPCS

## 2022-09-15 ASSESSMENT — EXERCISE STRESS TEST
PEAK_BP: 114/60
PEAK_METS: 2.4
PEAK_HR: 83

## 2022-09-19 ENCOUNTER — APPOINTMENT (OUTPATIENT)
Dept: CARDIAC REHAB | Age: 77
End: 2022-09-19
Payer: MEDICARE

## 2022-09-20 ENCOUNTER — HOSPITAL ENCOUNTER (OUTPATIENT)
Dept: CARDIAC REHAB | Age: 77
Setting detail: RECURRING SERIES
Discharge: HOME | End: 2022-09-23
Payer: MEDICARE

## 2022-09-20 PROCEDURE — G0239 OTH RESP PROC, GROUP: HCPCS

## 2022-09-20 ASSESSMENT — EXERCISE STRESS TEST
PEAK_HR: 88
PEAK_METS: 2.4
PEAK_BP: 140/70

## 2022-09-21 ENCOUNTER — APPOINTMENT (OUTPATIENT)
Dept: CARDIAC REHAB | Age: 77
End: 2022-09-21
Payer: MEDICARE

## 2022-09-22 ENCOUNTER — TELEPHONE (OUTPATIENT)
Dept: PULMONOLOGY | Age: 77
End: 2022-09-22

## 2022-09-22 ENCOUNTER — HOSPITAL ENCOUNTER (OUTPATIENT)
Dept: CARDIAC REHAB | Age: 77
Setting detail: RECURRING SERIES
End: 2022-09-22
Payer: MEDICARE

## 2022-09-22 ENCOUNTER — OFFICE VISIT (OUTPATIENT)
Dept: PULMONOLOGY | Age: 77
End: 2022-09-22
Payer: MEDICARE

## 2022-09-22 VITALS
HEART RATE: 85 BPM | SYSTOLIC BLOOD PRESSURE: 149 MMHG | DIASTOLIC BLOOD PRESSURE: 88 MMHG | TEMPERATURE: 97.2 F | WEIGHT: 156 LBS | BODY MASS INDEX: 27.64 KG/M2 | OXYGEN SATURATION: 100 % | HEIGHT: 63 IN

## 2022-09-22 VITALS — WEIGHT: 155.8 LBS | OXYGEN SATURATION: 91 % | BODY MASS INDEX: 27.6 KG/M2

## 2022-09-22 DIAGNOSIS — R60.9 EDEMA, UNSPECIFIED TYPE: ICD-10-CM

## 2022-09-22 DIAGNOSIS — I27.20 PULMONARY HYPERTENSION (HCC): ICD-10-CM

## 2022-09-22 DIAGNOSIS — R06.09 DYSPNEA ON EXERTION: ICD-10-CM

## 2022-09-22 DIAGNOSIS — I27.20 PULMONARY HYPERTENSION (HCC): Primary | ICD-10-CM

## 2022-09-22 LAB — NT PRO BNP: 37 PG/ML

## 2022-09-22 PROCEDURE — 1036F TOBACCO NON-USER: CPT | Performed by: INTERNAL MEDICINE

## 2022-09-22 PROCEDURE — 1123F ACP DISCUSS/DSCN MKR DOCD: CPT | Performed by: INTERNAL MEDICINE

## 2022-09-22 PROCEDURE — 99215 OFFICE O/P EST HI 40 MIN: CPT | Performed by: INTERNAL MEDICINE

## 2022-09-22 PROCEDURE — G8417 CALC BMI ABV UP PARAM F/U: HCPCS | Performed by: INTERNAL MEDICINE

## 2022-09-22 PROCEDURE — 1090F PRES/ABSN URINE INCON ASSESS: CPT | Performed by: INTERNAL MEDICINE

## 2022-09-22 PROCEDURE — G8427 DOCREV CUR MEDS BY ELIG CLIN: HCPCS | Performed by: INTERNAL MEDICINE

## 2022-09-22 PROCEDURE — G8400 PT W/DXA NO RESULTS DOC: HCPCS | Performed by: INTERNAL MEDICINE

## 2022-09-22 RX ORDER — FUROSEMIDE 20 MG/1
20 TABLET ORAL 2 TIMES DAILY
COMMUNITY
End: 2022-09-22

## 2022-09-22 RX ORDER — TADALAFIL 20 MG/1
40 TABLET ORAL DAILY
Qty: 30 TABLET | Refills: 11 | Status: SHIPPED | OUTPATIENT
Start: 2022-09-22

## 2022-09-22 ASSESSMENT — PULMONARY FUNCTION TESTS
FEV1 (%PREDICTED): 47
FVC (LITERS): 1.02
FEV1/FVC: 0.83

## 2022-09-22 ASSESSMENT — ENCOUNTER SYMPTOMS
NAUSEA: 0
SHORTNESS OF BREATH: 1
ABDOMINAL PAIN: 0
WHEEZING: 1
CONSTIPATION: 0
VOMITING: 0
DIARRHEA: 0
APNEA: 0
CHEST TIGHTNESS: 0
COUGH: 1

## 2022-09-22 ASSESSMENT — LIFESTYLE VARIABLES: SMOKELESS_TOBACCO: NO

## 2022-09-22 ASSESSMENT — EXERCISE STRESS TEST: PEAK_BP: 126/74

## 2022-09-22 NOTE — PATIENT INSTRUCTIONS
Please plan to get labs done today. We may need to adjust diuretics slightly today. Will re-do the 6MWT and TTE in December and have a visit then. We are submitting paperwork to obtain Tadalafil (ad circa) which is a once daily version of sildenafil. If this is a cost-effective alternative, we should switch.

## 2022-09-22 NOTE — TELEPHONE ENCOUNTER
Called and spoke with Gato Isaacs at [facility], [facility]. She states that the prescription for Tadalafil can be e-scribed to their pharmacy. Dr. Phu Albright will be informed.

## 2022-09-22 NOTE — PROGRESS NOTES
Dr. Marylen Kief, MD  [address] [place]yl[place] Barragan. [address] [facility], [address] [facility]  [phone]    Patient Name:  Cheryl Yoo  YOB: 1945  Office Visit: 9/22/2022    ASSESSMENT AND PLAN:  (Medical Decision Making)  Cheryl Yoo is a 68 y.o. female with isolated precapillary pulmonary hypertension on Tyvaso 12puffs qid, sildenafil 20mg tid, and torsemide 20mg daily. She is doing well at pulmonary rehab, though her 6-minute walk testing was declined with a distance of 261 m earlier this month. CT of chest was reviewed and does not show worsening of fibrotic changes from sarcoidosis though a mediastinal LN is more prominent. 1. Pulmonary hypertension ([address]  Continue pulmonary rehab. We will transition the sildenafil 20 mg 3 times daily to tadalafil 40mg daily for convenience of once daily dosing if it is affordable to the patient. Continue Tyvaso at 64mcg qid. Follow-up echocardiogram in December as well as 6-minute walk testing. Review BNP today and consider titrating up diuretic. Patient is unsure if she is on Lasix or torsemide right now and says she will confirm when we call her about her lab work. -     Brain Natriuretic Peptide; Future  -     Ambulatory referral to Cardiology    2. Edema, unspecified type  Suspect she needs slight adjustment in diuretics  -     Brain Natriuretic Peptide; Future  -     Ambulatory referral to Cardiology    3. Dyspnea on exertion  Stable per her report, though 6-minute walk test raises question of worsening. CT of chest and PFT did not suggest that fibrotic changes worsened. Arden Hopper MD  Electronically signed    Clinical time for encounter was 48 minutes.   _____________________________________________________________________________________________________________________    Reason for Visit:  Follow-up and Other (PHTN)      History of Present Illness:     Cheryl Yoo is a 71yoF with h/o fibronodular sarcoidosis (FVC 56%; bx of neck cervical node in 20s), prior hemoptysis from the lingular segment with embolization by Dr. Jimenez Moreno on 1/6/21 (left bronchial artery was hypertrophied), severe precapillary pulmonary hypertension (mPAP 39, PVR 8.8). Symptoms of lower extremity edema occurred after embolization prompted evaluation for PH. The LVEDP and PCWP were low, ruling out PH due to left heart disease. She is now on 3lpm of oxygen all the time. No prior use of anorexigens, no chemo drugs or other medications known to cause PH . Worked as an  without occupational exposures. She is a never smoker. Her V/Q scan did not suggest CTEPH. She has mild sleep apnea and is supposed to be on CPAP but hasn't been using. Today her swelling is improved but she complains of just tiredness and fatigue. This is new since December 2021. The only real medication change has been the addition of torsemide 20mg for edema at last visit. We will    PH medications  Tyvaso 64mcg qid  sildenafil 20mg tid. Tobacco Use: Low Risk     Smoking Tobacco Use: Never    Smokeless Tobacco Use: Never       Review of Systems:  Review of Systems   Constitutional:  Positive for fatigue. Negative for chills, fever and unexpected weight change. Respiratory:  Positive for cough, shortness of breath and wheezing. Negative for apnea and chest tightness. Cardiovascular:  Negative for chest pain, palpitations and leg swelling. Gastrointestinal:  Negative for abdominal pain, constipation, diarrhea, nausea and vomiting. Neurological:  Negative for dizziness, tremors, seizures, weakness and headaches.       Physical exam:    Vitals:    09/22/22 0943   BP: (!) 149/88   Pulse: 85   Temp: 97.2 °F (36.2 °C)   SpO2: 100%     Weight Metrics 9/22/2022 9/20/2022 9/13/2022 9/6/2022 8/30/2022 8/23/2022 8/16/2022   Weight 156 lb 155 lb 12.8 oz 157 lb 3.2 oz 154 lb 12.8 oz 156 lb 156 lb 3.2 oz 156 lb 3.2 oz   Waist Measure Inches - - - - - - -   Body Fat % - - - - - - - BMI (Calculated) 27.7 kg/m2 0 kg/m2 0 kg/m2 0 kg/m2 0 kg/m2 0 kg/m2 0 kg/m2       Body mass index is 27.63 kg/m². General Appearance: The patient is pleasant and in no respiratory distress. HEENT: PERRLA. Conjunctivae unremarkable. Nasal mucosa is without epistaxis, exudate, or polyps. Neck/Lymphatic:  Symmetrical with no elevation of jugular venous pulsation. Trachea midline. No thyroid enlargement. No cervical adenopathy. Lungs:  Normal respiratory effort with symmetrical lung expansion. Breath sounds with few crackles bilaterally  Heart:  RRR  Abdomen:  Soft and non-tender. No hepatosplenomegaly. Bowel sounds are normal.    Extremity:  No edema, clubbing or cyanosis  Neuro: The patient is alert and oriented to person, place, and time. Memory appears intact and mood is normal.  No gross sensorimotor deficits are present. DIAGNOSTIC TESTS:                                                                                                             Pulmonary function testing:  Moderate restriction with severely reduced diffusing capacity    01/10/2019  5/6/2021 9/8/2022   FVC 1.12-(50%)  1.02 (43%) 1.03 (43%)   FEV1 0.96-(56%)  0.85 (47%) 0.84 (47%)   FEV1/FVC 0.86  0.83 0.81   TLC    2.54 (55%) 2.65 (57%)   FRC     1.72 (65%)   RV     1.62 (75%)   DLCO    8.3 (41%) 6.8 (34%)        6MWT 10/8/18 04/29/2021 09/20/2021 9/8/22   distance 365m 323m 336m 261 meters   Start;end O2 sat 96%;87% 98;91 100%;94% 98%; 82%   Max CIERA dyspnea 0 3 2;10 0;7   COMMENTS   Pt walked with poc at 3lpm Pt walked with 3lpm  5lpm pulse        Right Heart Cath  4/5/2021   RA 6   RV 65/6   PA 75/21 (mPAP 39)   PCWP 7   CO 3.6   PVR 8.8   LVEDP 5      Negative vasoreactivity test 4/5/21  Adenosine PA (mPAP) CO   50 74/23 (40) 4.0   100 75/21  (39)  4.2   150 66/18 (34)  3.9   200 73/18 (36)  4.5      TTE  3/16/2021 12/2/21    LV  EF 60 to 65%; indeterminate diastolic function  EF 60 to 65%    RV  mildly dilated, normal systolic function  normal cavity size and global systolic function    Peak TRV  4.3m/s  3.6 m/s    RVSP  75  55 mmHg    COMMENTS  normal valves            Oximetry and/or sleep study result:      Chest CT 1/5/21:  IMPRESSION: Bronchiectasis and fibrosis in both lungs consistent with history of sarcoidosis. V/Q Scan 4/27/21:   1. Low probability for pulmonary embolism. 2. Multifocal matched perfusion defects, likely consequent to the patient's  known chronic sarcoidosis and multifocal chronic fibrotic changes demonstrated  on recent chest CT. Lab Diagnostics:  HIV neg  HCV neg  Direct DARRIUS positive:  AntiRNP ab 3.7 (slightly elevated). AntidsDNA neg  NT pro- on 3/12/21  LFTs normal     AMBULATING OXIMETRY: 9/20/2021 O2 sat HR   Room air at Rest  97%  69 bpm   Room air ambulating  83%  73 bpm   (recovery) Ambulating on 2 Lpm  94%  59 bpm        Sulfa antibiotics  Current Outpatient Medications   Medication Sig    furosemide (LASIX) 20 MG tablet Take 20 mg by mouth 2 times daily    albuterol (PROVENTIL) (2.5 MG/3ML) 0.083% nebulizer solution Inhale 2.5 mg into the lungs 3 times daily    brimonidine-timolol (COMBIGAN) 0.2-0.5 % ophthalmic solution 1 drop every 12 hours    Calcium Carbonate-Vitamin D (CALCIUM-VITAMIN D) 600-125 MG-UNIT TABS Take by mouth    Cholecalciferol 50 MCG (2000 UT) TABS Take 2,000 Units by mouth daily    Treprostinil (TYVASO) 0.6 MG/ML SOLN Inhale into the lungs    torsemide (DEMADEX) 10 MG tablet Take 10 tablets by mouth daily (Patient not taking: Reported on 9/22/2022)    potassium chloride (KLOR-CON M) 20 MEQ extended release tablet Take 20 mEq by mouth daily (Patient not taking: Reported on 9/22/2022)    pyridoxine (B-6) 100 MG tablet Take 100 mg by mouth (Patient not taking: Reported on 9/22/2022)     No current facility-administered medications for this visit.      Past Medical History:   Diagnosis Date    Arthritis     osteo    Glaucoma     Ill-defined condition Pulmonary HTN    Pulmonary hypertension (HCC)     Sarcoidosis     Sarc[address]osis of lung ([facility] [address]      Family History   Problem Relation Age of Onset    Hypertension Sister     Elevated Lipids Father     Hypertension Sister     Diabetes Maternal Grandmother     Diabetes Mother     Hypertension Mother     Diabetes Maternal Grandfather     Cancer Mother     Breast Cancer Mother 79    High Cholesterol Sister     High Cholesterol Mother

## 2022-09-26 ENCOUNTER — APPOINTMENT (OUTPATIENT)
Dept: CARDIAC REHAB | Age: 77
End: 2022-09-26
Payer: MEDICARE

## 2022-09-27 ENCOUNTER — HOSPITAL ENCOUNTER (OUTPATIENT)
Dept: CARDIAC REHAB | Age: 77
Setting detail: RECURRING SERIES
Discharge: HOME | End: 2022-09-30
Payer: MEDICARE

## 2022-09-27 VITALS — WEIGHT: 154 LBS | BODY MASS INDEX: 27.28 KG/M2

## 2022-09-27 PROCEDURE — G0239 OTH RESP PROC, GROUP: HCPCS

## 2022-09-27 ASSESSMENT — EXERCISE STRESS TEST
PEAK_BP: 114/60
PEAK_HR: 87
PEAK_METS: 2.4

## 2022-09-28 ENCOUNTER — APPOINTMENT (OUTPATIENT)
Dept: CARDIAC REHAB | Age: 77
End: 2022-09-28
Payer: MEDICARE

## 2022-09-29 ENCOUNTER — HOSPITAL ENCOUNTER (OUTPATIENT)
Dept: CARDIAC REHAB | Age: 77
Setting detail: RECURRING SERIES
Discharge: HOME | End: 2022-10-02
Payer: MEDICARE

## 2022-09-29 PROCEDURE — G0239 OTH RESP PROC, GROUP: HCPCS

## 2022-09-29 ASSESSMENT — EXERCISE STRESS TEST
PEAK_BP: 110/64
PEAK_METS: 2.4
PEAK_HR: 86

## 2022-10-03 ENCOUNTER — APPOINTMENT (OUTPATIENT)
Dept: CARDIAC REHAB | Age: 77
End: 2022-10-03
Payer: MEDICARE

## 2022-10-04 ENCOUNTER — TRANSCRIBE ORDERS (OUTPATIENT)
Dept: SCHEDULING | Age: 77
End: 2022-10-04

## 2022-10-04 ENCOUNTER — HOSPITAL ENCOUNTER (OUTPATIENT)
Dept: CARDIAC REHAB | Age: 77
Setting detail: RECURRING SERIES
Discharge: HOME | End: 2022-10-07
Payer: MEDICARE

## 2022-10-04 VITALS — WEIGHT: 156.2 LBS | BODY MASS INDEX: 27.67 KG/M2

## 2022-10-04 DIAGNOSIS — Z12.31 VISIT FOR SCREENING MAMMOGRAM: Primary | ICD-10-CM

## 2022-10-04 PROCEDURE — G0239 OTH RESP PROC, GROUP: HCPCS

## 2022-10-04 ASSESSMENT — EXERCISE STRESS TEST
PEAK_BP: 118/70
PEAK_HR: 93
PEAK_METS: 2.4

## 2022-10-05 ENCOUNTER — APPOINTMENT (OUTPATIENT)
Dept: CARDIAC REHAB | Age: 77
End: 2022-10-05
Payer: MEDICARE

## 2022-10-06 ENCOUNTER — HOSPITAL ENCOUNTER (OUTPATIENT)
Dept: CARDIAC REHAB | Age: 77
Setting detail: RECURRING SERIES
Discharge: HOME | End: 2022-10-09
Payer: MEDICARE

## 2022-10-06 PROCEDURE — G0239 OTH RESP PROC, GROUP: HCPCS

## 2022-10-06 ASSESSMENT — EXERCISE STRESS TEST
PEAK_BP: 100/56
PEAK_HR: 99
PEAK_METS: 2.4

## 2022-10-10 ENCOUNTER — APPOINTMENT (OUTPATIENT)
Dept: CARDIAC REHAB | Age: 77
End: 2022-10-10
Payer: MEDICARE

## 2022-10-11 ENCOUNTER — HOSPITAL ENCOUNTER (OUTPATIENT)
Dept: CARDIAC REHAB | Age: 77
Setting detail: RECURRING SERIES
Discharge: HOME | End: 2022-10-14
Payer: MEDICARE

## 2022-10-11 PROCEDURE — G0239 OTH RESP PROC, GROUP: HCPCS

## 2022-10-11 ASSESSMENT — EXERCISE STRESS TEST
PEAK_HR: 84
PEAK_METS: 2.4
PEAK_BP: 116/76

## 2022-10-12 ENCOUNTER — APPOINTMENT (OUTPATIENT)
Dept: CARDIAC REHAB | Age: 77
End: 2022-10-12
Payer: MEDICARE

## 2022-10-13 ENCOUNTER — APPOINTMENT (OUTPATIENT)
Dept: CARDIAC REHAB | Age: 77
End: 2022-10-13
Payer: MEDICARE

## 2022-10-17 ENCOUNTER — TELEPHONE (OUTPATIENT)
Dept: PULMONOLOGY | Age: 77
End: 2022-10-17

## 2022-10-17 ENCOUNTER — APPOINTMENT (OUTPATIENT)
Dept: CARDIAC REHAB | Age: 77
End: 2022-10-17
Payer: MEDICARE

## 2022-10-17 NOTE — TELEPHONE ENCOUNTER
Patient is having side effects for:     Tadalafil, PAH, 20 MG tablet     Having a lot of side effects from this medicine

## 2022-10-18 NOTE — TELEPHONE ENCOUNTER
Spoke with pt about Dr Lluvia Louie recommendation, reluctant but will attempt to take the medication again.

## 2022-10-18 NOTE — TELEPHONE ENCOUNTER
Recommend she try the tadalafil again, but start at half dose. Take 20mg daily x 2 weeks, then increase to 40mg daily. If muscle pains recur and are not tolerable, have her call us back.     Deya Hartley MD

## 2022-10-19 VITALS — OXYGEN SATURATION: 90 % | BODY MASS INDEX: 27.78 KG/M2 | WEIGHT: 156.8 LBS

## 2022-10-19 ASSESSMENT — EXERCISE STRESS TEST: PEAK_BP: 118/76

## 2022-10-19 ASSESSMENT — PULMONARY FUNCTION TESTS
FEV1/FVC: 0.83
FVC (LITERS): 1.02
FEV1 (%PREDICTED): 47

## 2022-10-19 ASSESSMENT — LIFESTYLE VARIABLES: SMOKELESS_TOBACCO: NO

## 2022-10-20 ENCOUNTER — HOSPITAL ENCOUNTER (OUTPATIENT)
Dept: CARDIAC REHAB | Age: 77
Setting detail: RECURRING SERIES
End: 2022-10-20
Payer: MEDICARE

## 2022-10-25 ENCOUNTER — HOSPITAL ENCOUNTER (OUTPATIENT)
Dept: CARDIAC REHAB | Age: 77
Setting detail: RECURRING SERIES
Discharge: HOME | End: 2022-10-28
Payer: MEDICARE

## 2022-10-25 VITALS — BODY MASS INDEX: 27.53 KG/M2 | WEIGHT: 155.4 LBS

## 2022-10-25 PROCEDURE — G0239 OTH RESP PROC, GROUP: HCPCS

## 2022-10-25 ASSESSMENT — EXERCISE STRESS TEST
PEAK_BP: 126/68
PEAK_METS: 2.5
PEAK_HR: 108

## 2022-10-27 ENCOUNTER — HOSPITAL ENCOUNTER (OUTPATIENT)
Dept: CARDIAC REHAB | Age: 77
Setting detail: RECURRING SERIES
Discharge: HOME | End: 2022-10-30
Payer: MEDICARE

## 2022-10-27 PROCEDURE — G0239 OTH RESP PROC, GROUP: HCPCS

## 2022-10-27 ASSESSMENT — EXERCISE STRESS TEST
PEAK_METS: 2.5
PEAK_BP: 104/58
PEAK_HR: 88

## 2022-10-31 ENCOUNTER — HOSPITAL ENCOUNTER (OUTPATIENT)
Dept: MAMMOGRAPHY | Age: 77
Discharge: HOME | End: 2022-11-03
Payer: MEDICARE

## 2022-10-31 DIAGNOSIS — Z12.31 VISIT FOR SCREENING MAMMOGRAM: ICD-10-CM

## 2022-10-31 PROCEDURE — 77067 SCR MAMMO BI INCL CAD: CPT

## 2022-11-01 ENCOUNTER — HOSPITAL ENCOUNTER (OUTPATIENT)
Dept: CARDIAC REHAB | Age: 77
Setting detail: RECURRING SERIES
Discharge: HOME | End: 2022-11-04
Payer: MEDICARE

## 2022-11-01 PROCEDURE — G0239 OTH RESP PROC, GROUP: HCPCS

## 2022-11-01 ASSESSMENT — EXERCISE STRESS TEST
PEAK_METS: 2.5
PEAK_HR: 100
PEAK_BP: 160/76

## 2022-11-03 ENCOUNTER — HOSPITAL ENCOUNTER (OUTPATIENT)
Dept: CARDIAC REHAB | Age: 77
Setting detail: RECURRING SERIES
End: 2022-11-03
Payer: MEDICARE

## 2022-11-17 VITALS — WEIGHT: 158.2 LBS | OXYGEN SATURATION: 91 % | BODY MASS INDEX: 28.02 KG/M2

## 2022-11-17 ASSESSMENT — LIFESTYLE VARIABLES: SMOKELESS_TOBACCO: NO

## 2022-11-17 ASSESSMENT — PULMONARY FUNCTION TESTS
FEV1 (%PREDICTED): 47
FVC (LITERS): 1.02
FEV1/FVC: 0.83

## 2022-11-17 ASSESSMENT — EXERCISE STRESS TEST
PEAK_HR: 108
PEAK_RPE: 2
PEAK_BP: 126/78
PEAK_BP: 126/76

## 2022-11-22 RX ORDER — TADALAFIL 20 MG/1
20 TABLET ORAL DAILY
Qty: 30 TABLET | Refills: 11 | Status: SHIPPED | OUTPATIENT
Start: 2022-11-22

## 2022-11-29 ENCOUNTER — HOSPITAL ENCOUNTER (OUTPATIENT)
Dept: CARDIAC REHAB | Age: 77
Setting detail: RECURRING SERIES
Discharge: HOME | End: 2022-12-02
Payer: MEDICARE

## 2022-11-29 VITALS — BODY MASS INDEX: 27.1 KG/M2 | WEIGHT: 153 LBS

## 2022-11-29 PROCEDURE — G0239 OTH RESP PROC, GROUP: HCPCS

## 2022-11-29 ASSESSMENT — EXERCISE STRESS TEST
PEAK_METS: 2.4
PEAK_HR: 90
PEAK_BP: 112/70

## 2022-12-01 ENCOUNTER — HOSPITAL ENCOUNTER (OUTPATIENT)
Dept: CARDIAC REHAB | Age: 77
Setting detail: RECURRING SERIES
Discharge: HOME | End: 2022-12-04
Payer: MEDICARE

## 2022-12-01 PROCEDURE — G0239 OTH RESP PROC, GROUP: HCPCS

## 2022-12-01 ASSESSMENT — EXERCISE STRESS TEST
PEAK_METS: 2.4
PEAK_HR: 91
PEAK_BP: 102/60

## 2022-12-06 ENCOUNTER — APPOINTMENT (OUTPATIENT)
Dept: CARDIAC REHAB | Age: 77
End: 2022-12-06
Payer: MEDICARE

## 2022-12-07 VITALS — OXYGEN SATURATION: 90 %

## 2022-12-07 ASSESSMENT — PATIENT HEALTH QUESTIONNAIRE - PHQ9
SUM OF ALL RESPONSES TO PHQ QUESTIONS 1-9: 4
6. FEELING BAD ABOUT YOURSELF - OR THAT YOU ARE A FAILURE OR HAVE LET YOURSELF OR YOUR FAMILY DOWN: 0
7. TROUBLE CONCENTRATING ON THINGS, SUCH AS READING THE NEWSPAPER OR WATCHING TELEVISION: 0
4. FEELING TIRED OR HAVING LITTLE ENERGY: 2
SUM OF ALL RESPONSES TO PHQ QUESTIONS 1-9: 4
SUM OF ALL RESPONSES TO PHQ9 QUESTIONS 1 & 2: 0
SUM OF ALL RESPONSES TO PHQ QUESTIONS 1-9: 4
1. LITTLE INTEREST OR PLEASURE IN DOING THINGS: 0
10. IF YOU CHECKED OFF ANY PROBLEMS, HOW DIFFICULT HAVE THESE PROBLEMS MADE IT FOR YOU TO DO YOUR WORK, TAKE CARE OF THINGS AT HOME, OR GET ALONG WITH OTHER PEOPLE: 1
SUM OF ALL RESPONSES TO PHQ QUESTIONS 1-9: 4
5. POOR APPETITE OR OVEREATING: 0
8. MOVING OR SPEAKING SO SLOWLY THAT OTHER PEOPLE COULD HAVE NOTICED. OR THE OPPOSITE, BEING SO FIGETY OR RESTLESS THAT YOU HAVE BEEN MOVING AROUND A LOT MORE THAN USUAL: 0
3. TROUBLE FALLING OR STAYING ASLEEP: 2
9. THOUGHTS THAT YOU WOULD BE BETTER OFF DEAD, OR OF HURTING YOURSELF: 0
2. FEELING DOWN, DEPRESSED OR HOPELESS: 0

## 2022-12-07 ASSESSMENT — PULMONARY FUNCTION TESTS
FEV1/FVC: 0.83
FEV1 (%PREDICTED): 47
FVC (LITERS): 1.02

## 2022-12-07 ASSESSMENT — EXERCISE STRESS TEST
PEAK_RPE: 2
PEAK_BP: 112/70
PEAK_HR: 108
PEAK_BP: 126/76

## 2022-12-07 ASSESSMENT — LIFESTYLE VARIABLES: SMOKELESS_TOBACCO: NO

## 2022-12-08 ENCOUNTER — APPOINTMENT (OUTPATIENT)
Dept: CARDIAC REHAB | Age: 77
End: 2022-12-08
Payer: MEDICARE

## 2022-12-15 ENCOUNTER — OFFICE VISIT (OUTPATIENT)
Dept: PULMONOLOGY | Age: 77
End: 2022-12-15
Payer: MEDICARE

## 2022-12-15 VITALS
SYSTOLIC BLOOD PRESSURE: 130 MMHG | WEIGHT: 159 LBS | RESPIRATION RATE: 20 BRPM | HEART RATE: 83 BPM | HEIGHT: 63 IN | DIASTOLIC BLOOD PRESSURE: 80 MMHG | TEMPERATURE: 98 F | OXYGEN SATURATION: 99 % | BODY MASS INDEX: 28.17 KG/M2

## 2022-12-15 DIAGNOSIS — Z51.81 MEDICATION MONITORING ENCOUNTER: ICD-10-CM

## 2022-12-15 DIAGNOSIS — R40.0 DAYTIME SLEEPINESS: ICD-10-CM

## 2022-12-15 DIAGNOSIS — I27.20 PULMONARY HYPERTENSION (HCC): Primary | ICD-10-CM

## 2022-12-15 DIAGNOSIS — G47.33 OSA (OBSTRUCTIVE SLEEP APNEA): ICD-10-CM

## 2022-12-15 DIAGNOSIS — D86.9 SARCOIDOSIS: ICD-10-CM

## 2022-12-15 PROCEDURE — G8427 DOCREV CUR MEDS BY ELIG CLIN: HCPCS | Performed by: INTERNAL MEDICINE

## 2022-12-15 PROCEDURE — G8400 PT W/DXA NO RESULTS DOC: HCPCS | Performed by: INTERNAL MEDICINE

## 2022-12-15 PROCEDURE — G8484 FLU IMMUNIZE NO ADMIN: HCPCS | Performed by: INTERNAL MEDICINE

## 2022-12-15 PROCEDURE — G8417 CALC BMI ABV UP PARAM F/U: HCPCS | Performed by: INTERNAL MEDICINE

## 2022-12-15 PROCEDURE — 99215 OFFICE O/P EST HI 40 MIN: CPT | Performed by: INTERNAL MEDICINE

## 2022-12-15 PROCEDURE — 1123F ACP DISCUSS/DSCN MKR DOCD: CPT | Performed by: INTERNAL MEDICINE

## 2022-12-15 PROCEDURE — 1036F TOBACCO NON-USER: CPT | Performed by: INTERNAL MEDICINE

## 2022-12-15 PROCEDURE — 1090F PRES/ABSN URINE INCON ASSESS: CPT | Performed by: INTERNAL MEDICINE

## 2022-12-15 ASSESSMENT — ENCOUNTER SYMPTOMS
CONSTIPATION: 0
WHEEZING: 1
DIARRHEA: 0
CHEST TIGHTNESS: 0
ABDOMINAL PAIN: 0
NAUSEA: 0
COUGH: 1
VOMITING: 0
APNEA: 0
SHORTNESS OF BREATH: 1

## 2022-12-15 NOTE — PROGRESS NOTES
Dr. Valentino Marino MD  [address] [facility] Sheri Mejia. [address] [facility], [address] [facility]  [phone]    Patient Name:  Akash Montoya  YOB: 1945  Office Visit: 12/15/2022    ASSESSMENT AND PLAN:  (Medical Decision Making)  Akash Montoya is a 68 y.o. female with isolated precapillary pulmonary hypertension on Tyvaso 12puffs qid, sildenafil 20mg tid, and torsemide 20mg daily. She is doing well at pulmonary rehab, though her 6-minute walk testing was declined with a distance of 261 m earlier this month. CT of chest was reviewed and does not show worsening of fibrotic changes from sarcoidosis though a mediastinal LN is more prominent. 1. Pulmonary hypertension (HCC)  Continue tyvaso 64 qid, tadalafil 20mg daily. Plan to add macitentan. 2. Daytime sleepiness  Refer to Dr. Dave Jewell to discuss positional therapy, oral device options or other ways to improve symptoms.   -     [facility] - Antoni Hall MD, Sleep Medicine, [place]    3. HAO (obstructive sleep apnea)  As above, intolerant of CPAP thus far. -     Nba Edwards MD, Sleep Medicine, [place]    4. Medication monitoring encounter  Starting ERA. Will check LFTs. Paperwork filled out for macitentan today. -     Hepatic Function Panel; Future    5. Sarcoidosis  With R knee joint and hand arthritic symptoms. Also has had RNP and DARRIUS Abs positive. ? If she has had recent rheumatology evaluation. Will refer to [place] to evaluate the cause of joint complaints. Return in 3 months. Gadiel Morris MD  Electronically signed    Clinical time for encounter was 48 minutes.   _____________________________________________________________________________________________________________________    Reason for Visit:  Other    History of Present Illness:     Akash Montoya is a 71yoF with h/o fibronodular sarcoidosis (FVC 56%; bx of neck cervical node in 20s), prior hemoptysis from the lingular segment with embolization by Dr. Rubia Acosta on Negative for dizziness, tremors, seizures, weakness and headaches. Physical exam:    Vitals:    12/15/22 0828   BP: 130/80   Pulse: 83   Resp: 20   Temp: 98 °F (36.7 °C)   SpO2: 99%     Weight Metrics 12/15/2022 12/13/2022 12/7/2022 11/29/2022 11/1/2022 10/25/2022 10/11/2022   Weight 159 lb 153 lb - 153 lb 158 lb 3.2 oz 155 lb 6.4 oz 156 lb 12.8 oz   Waist Measure Inches - - - - - - -   Body Fat % - - 29.5 - - - -   BMI (Calculated) 28.2 kg/m2 27.2 kg/m2 - 0 kg/m2 0 kg/m2 0 kg/m2 0 kg/m2       Body mass index is 28.17 kg/m². General Appearance: The patient is pleasant and in no respiratory distress. HEENT: PERRLA. Conjunctivae unremarkable. Nasal mucosa is without epistaxis, exudate, or polyps. Neck/Lymphatic:  Symmetrical with no elevation of jugular venous pulsation. Trachea midline. No thyroid enlargement. No cervical adenopathy. Lungs:  Normal respiratory effort with symmetrical lung expansion. Breath sounds with few crackles bilaterally  Heart:  RRR  Abdomen:  Soft and non-tender. No hepatosplenomegaly. Bowel sounds are normal.    Extremity: R knee is swollen. Fingers appear slightly swollen as well. Neuro: The patient is alert and oriented to person, place, and time. Memory appears intact and mood is normal.  No gross sensorimotor deficits are present. DIAGNOSTIC TESTS:                                                                                                             Pulmonary function testing:  Moderate restriction with severely reduced diffusing capacity    01/10/2019  5/6/2021 9/8/2022   FVC 1.12-(50%)  1.02 (43%) 1.03 (43%)   FEV1 0.96-(56%)  0.85 (47%) 0.84 (47%)   FEV1/FVC 0.86  0.83 0.81   TLC    2.54 (55%) 2.65 (57%)   FRC     1.72 (65%)   RV     1.62 (75%)   DLCO    8.3 (41%) 6.8 (34%)        6MWT 10/8/18 04/29/2021 09/20/2021 9/8/22   distance 365m 323m 336m 261 meters   Start;end O2 sat 96%;87% 98;91 100%;94% 98%; 82%   Max CIERA dyspnea 0 3 2;10 0;7   COMMENTS Pt walked with poc at 3lpm Pt walked with 3lpm  5lpm pulse  R knee pain        Right Heart Cath  4/5/2021   RA 6   RV 65/6   PA 75/21 (mPAP 39)   PCWP 7   CO 3.6   PVR 8.8   LVEDP 5      Negative vasoreactivity test 4/5/21  Adenosine PA (mPAP) CO   50 74/23 (40) 4.0   100 75/21  (39)  4.2   150 66/18 (34)  3.9   200 73/18 (36)  4.5      TTE  3/16/2021 12/2/21 12/13/22 (tyvaso/tadalafil)   LV  EF 60 to 65%; indeterminate diastolic function  EF 60 to 65% EF 60-65%  Abnormal diastolic function   RV  mildly dilated, normal systolic function  normal cavity size and global systolic function    Peak TRV  4.3m/s  3.6 m/s 3.46m/s   RVSP  75  55 mmHg 51mmHg   COMMENTS  normal valves   Dilated LA and RA         Oximetry and/or sleep study result 5/2021:      Chest CT 1/5/21:  IMPRESSION: Bronchiectasis and fibrosis in both lungs consistent with history of sarcoidosis. V/Q Scan 4/27/21:   1. Low probability for pulmonary embolism. 2. Multifocal matched perfusion defects, likely consequent to the patient's  known chronic sarcoidosis and multifocal chronic fibrotic changes demonstrated  on recent chest CT. Lab Diagnostics:  HIV neg  HCV neg  Direct DARRIUS positive:  AntiRNP ab 3.7 (slightly elevated).   AntidsDNA neg  NT pro- on 3/12/21  LFTs normal     AMBULATING OXIMETRY: 9/20/2021 O2 sat HR   Room air at Rest  97%  69 bpm   Room air ambulating  83%  73 bpm   (recovery) Ambulating on 2 Lpm  94%  59 bpm        Sulfa antibiotics  Current Outpatient Medications   Medication Sig    Tadalafil, PAH, 20 MG tablet Take 1 tablet by mouth daily    Treprostinil 64 MCG POWD Inhale 64 mcg into the lungs in the morning, at noon, in the evening, and at bedtime    albuterol (PROVENTIL) (2.5 MG/3ML) 0.083% nebulizer solution Inhale 2.5 mg into the lungs 3 times daily    brimonidine-timolol (COMBIGAN) 0.2-0.5 % ophthalmic solution 1 drop every 12 hours    Calcium Carbonate-Vitamin D (CALCIUM-VITAMIN D) 600-125 MG-UNIT TABS Take by mouth    Cholecalciferol 50 MCG (2000 UT) TABS Take 2,000 Units by mouth daily     No current facility-administered medications for this visit.      Past Medical History:   Diagnosis Date    Arthritis     osteo    Glaucoma     Ill-defined condition     Pulmonary HTN    Pulmonary hypertension (HCC)     Sarcoidosis     Sarc[address]osis of lung ([facility] [address]      Family History   Problem Relation Age of Onset    Hypertension Sister     Elevated Lipids Father     Hypertension Sister     Diabetes Maternal Grandmother     Diabetes Mother     Hypertension Mother     Diabetes Maternal Grandfather     Cancer Mother     Breast Cancer Mother 79    High Cholesterol Sister     High Cholesterol Mother

## 2022-12-15 NOTE — PATIENT INSTRUCTIONS
Take 400mg-600m of ibuprofen in the morning the day before a 6 minute walk test and the day of a walk test.    2.  We plan to start macitentan, another BP medication for pulmonary hypertension. Start this medication at 1/2 tab daily x 2 weeks. Then increase to 1 tab daily. It can cause swelling, so please call us if swelling becomes an issue. Do not stop taking it, as the swelling often subsides as your body adjusts to the medication. 3.  Please bring us a copy of your bill for Tyvaso so we can try to help sort things out. 4.   I am referring you to talk to Dr. Velma Brewer about your tiredness and problems with sleeping. 5.  I am referring to [address] arthritis for evaluation of knee pain and hand joint swelling.

## 2022-12-22 ENCOUNTER — TELEPHONE (OUTPATIENT)
Dept: PULMONOLOGY | Age: 77
End: 2022-12-22

## 2022-12-22 NOTE — TELEPHONE ENCOUNTER
[facility] [address] [place] [address]. She states they are missing some diagnostic information - pt authorization form as well as diagnosis. Please call [phone]. No extension. Fax [phone]  She was advised that it will be Tuesday before we get back to her.

## 2023-01-12 NOTE — TELEPHONE ENCOUNTER
PA for Opsumit sent to [facility] [employer] via [employer]. Key: [address] already on file for this request. Authorization starting on 01/01/2023 and ending on 12/31/2023.

## 2023-01-16 ENCOUNTER — TELEPHONE (OUTPATIENT)
Dept: PULMONOLOGY | Age: 78
End: 2023-01-16

## 2023-01-16 NOTE — TELEPHONE ENCOUNTER
PA for Tadalafil sent to [facility] via [employer].  Key: [id number]    Your request has been approved  PA Case: [id number], Status: Approved, Coverage Starts on: 1/1/2023 12:00:00 AM, Coverage Ends on: 12/31/2023 12:00:00 AM.

## 2023-02-17 DIAGNOSIS — J84.9 INTERSTITIAL LUNG DISEASE (HCC): Primary | ICD-10-CM

## 2023-02-17 NOTE — PROGRESS NOTES
[facility]  [facility] , [address] [place] [address] Court, [address] [facility]  [phone]    Patient Name:  Fiona Woodward  YOB: 1945      Office Visit 2/20/2023    CHIEF COMPLAINT:    Chief Complaint   Patient presents with    Sleep Apnea    Follow-up    CPAP/BiPAP          HISTORY OF PRESENT ILLNESS:        Fiona Woodward is a 71yoF with h/o fibronodular sarcoidosis, severe precapillary pulmonary hypertension (mPAP 39, PVR 8.8) as well as chronic hypoxemic respiratory failure. She is now on 3lpm of continuous oxygen or 4-5lpm pulse. She has mild sleep apnea which was noted on TISH. Kayleen Portillo 2 years ago and AHI was 11.9/hour and the lowest oxygen saturation was 63%. She has been placed on CPAP at 7-10 cm along with oxygen 3 L/min. She had a great difficulty tolerating CPAP especially her mask which is currently the DreamWear nasal cushion pillow. She has daytime sleepiness and her Mountain score today was 14/24 indicating moderate hypersomnia. Her download indicated that last year she only used it a few days out of 6 months with the average use was 5 hours and 23 minutes on the days she used it. Her pressure requirement was between 7.4 and 9.2 cm. Her AHI with that was 1.3/hour. There is no history of cataplexy, hypnagogic hallucinations, or sleep paralysis. In addition, there is no history of frequent leg movements, kicking at night, or an inability to keep the legs still. Reviewed the pathophysiology of sleep apnea with her and her  and indicated to her the importance of treating her sleep apnea especially with a history of severe precapillary pulmonary hypertension. Furthermore, using oxygen alone will not correct sleep apnea and she has dental partial made which make it difficult to have oral appliance made. I suggested to try different type of nasal pillow or nasal cushion and see if will improve her compliance.   I will send a new order to her [employer] company to help with that. In the meantime I encouraged her to continue using oxygen continuously and try to put the machine on even she went to take a nap to improve her tolerance to the CPAP.   She is willing to do so       Sleepiness Scale:     Sitting and reading 2    Watching TV 2    Sitting inactive in a public place 0    As a passenger in a car for an hour without a break 3    Lying down to rest in the afternoon when circumstances Permits 1    Sitting and talking to someone 0    Sitting quietly after lunch without alcohol 3    In a car, while stopped for a few minutes 3    Total :  14        Sleep Study- HST 5/13/21  AHI 11.9  Sao2 63%    DME-[employer] [employer]  7-10cm/h2o w/3LPM O2    Mask- FF    Past Medical History:   Diagnosis Date    Arthritis     osteo    Glaucoma     Ill-defined condition     Pulmonary HTN    Pulmonary hypertension ([address]     Sarcoidosis     Sarcoidosis of lung ([address]        Patient Active Problem List   Diagnosis    Hypercholesteremia    Pseudomonas aeruginosa colonization    Sarcoidosis of lung ([address]    Interstitial lung disease ([address]    Pulmonary hypertension ([address]    Bronchiectasis without complication ([address]    Chronic respiratory failure with hypoxia ([address]    Controlled type 2 diabetes mellitus without complication, without long-term current use of insulin (HCC)    HAO on CPAP    Difficulty with CPAP use    Hypersomnolence       Past Surgical History:   Procedure Laterality Date    APPENDECTOMY      BREAST BIOPSY Left 1980    CHOLECYSTECTOMY, OPEN  1978    COLONOSCOPY  2017    HYSTERECTOMY (CERVIX STATUS UNKNOWN)  1979    IR EMBOLIZATION HEMORRHAGE  1/6/2021    IR EMBOLIZATION HEMORRHAGE  1/6/2021    IR EMBOLIZATION HEMORRHAGE 1/6/2021 [facility]       [unfilled]    Social History     Socioeconomic History    Marital status:      Spouse name: Not on file    Number of children: Not on file    Years of education: Not on file    Highest education level: Not on file Occupational History    Not on file   Tobacco Use    Smoking status: Never    Smokeless tobacco: Never   Substance and Sexual Activity    Alcohol use: Yes    Drug use: No    Sexual activity: Not on file   Other Topics Concern    Not on file   Social History Narrative    Not on file     Social Determinants of Health     Financial Resource Strain: Not on file   Food Insecurity: Not on file   Transportation Needs: Not on file   Physical Activity: Not on file   Stress: Not on file   Social Connections: Not on file   Intimate Partner Violence: Not on file   Housing Stability: Not on file         Family History   Problem Relation Age of Onset    Hypertension Sister     Elevated Lipids Father     Hypertension Sister     Diabetes Maternal Grandmother     Diabetes Mother     Hypertension Mother     Diabetes Maternal Grandfather     Cancer Mother     Breast Cancer Mother 79    High Cholesterol Sister     High Cholesterol Mother          Allergies   Allergen Reactions    Sulfa Antibiotics Itching         Current Outpatient Medications   Medication Sig    torsemide (DEMADEX) 10 MG tablet     metFORMIN (GLUCOPHAGE-XR) 500 MG extended release tablet TAKE 1 TABLET BY MOUTH ONCE DAILY    Tadalafil, PAH, 20 MG tablet Take 1 tablet by mouth daily    Treprostinil 64 MCG POWD Inhale 64 mcg into the lungs in the morning, at noon, in the evening, and at bedtime    albuterol (PROVENTIL) (2.5 MG/3ML) 0.083% nebulizer solution Inhale 2.5 mg into the lungs 3 times daily    brimonidine-timolol (COMBIGAN) 0.2-0.5 % ophthalmic solution 1 drop every 12 hours    Calcium Carbonate-Vitamin D (CALCIUM-VITAMIN D) 600-125 MG-UNIT TABS Take by mouth    Cholecalciferol 50 MCG (2000 UT) TABS Take 2,000 Units by mouth daily     No current facility-administered medications for this visit.            REVIEW OF SYSTEMS:   CONSTITUTIONAL:   There is no history of fever, chills, night sweats, weight loss, weight gain, persistent fatigue, or lethargy/hypersomnolence. EYES:   Denies problems with eye pain, erythema, blurred vision, or visual field loss. ENTM:   Denies history of tinnitus, epistaxis, sore throat, hoarseness, or dysphonia. LYMPH:   Denies swollen glands. CARDIAC:   No chest pain, pressure, discomfort, palpitations, orthopnea, murmurs, or edema. GI:   No dysphagia, heartburn reflux, nausea/vomiting, diarrhea, abdominal pain, or bleeding. :   Denies history of dysuria, hematuria, polyuria, or decreased urine output. MS:   No history of myalgias, arthralgias, bone pain, or muscle cramps. SKIN:   No history of rashes, jaundice, cyanosis, nodules, or ulcers. ENDO:   Negative for heat or cold intolerance. No history of DM. PSYCH:   Negative for anxiety, depression, insomnia, hallucinations. NEURO:   There is no history of AMS, persistent headache, decreased level of consciousness, seizures, or motor or sensory deficits. PHYSICAL EXAM:    Vitals:    02/20/23 1259   BP: 136/74   Pulse: 74   Resp: 14   Temp: 97.5 °F (36.4 °C)   SpO2: 95%        GENERAL APPEARANCE:   The patient is normal weight and in no respiratory distress. HEENT:   PERRL. Conjunctivae unremarkable. Nasal mucosa is without epistaxis, exudate, or polyps. Gums and dentition are unremarkable. There is moderate oropharyngeal narrowing. NECK/LYMPHATIC:   Symmetrical with no elevation of jugular venous pulsation. Trachea midline. No thyroid enlargement. No cervical adenopathy. LUNGS:   Normal respiratory effort with symmetrical lung expansion. Breath sounds are clear bilaterally. HEART:   There is a regular rate and rhythm. No murmur, rub, or gallop. There is trace edema in the lower extremities. ABDOMEN:   Soft and non-tender. No hepatosplenomegaly. Bowel sounds are normal.     SKIN:   There are no rashes, cyanosis, jaundice, or ecchymosis present.    EXTREMITIES:   The extremities are unremarkable without clubbing, cyanosis, joint inflammation, degenerative, or ischemic change. MUSCULOSKELETAL:   There is no abnormal tone, muscle atrophy, or abnormal movement present. NEURO:   The patient is alert and oriented to person, place, and time. Memory appears intact and mood is normal.  No gross sensorimotor deficits are present. DIAGNOSTIC TESTS:  HST 5/13/21             ASSESSMENT:  (Medical Decision Making)         ICD-10-CM    1. HAO on CPAP 7-10 cm with significant problems tolerating CPAP in the past.  We will change her mask and encouraged her to try it when she is taking naps and with sleep at nighttime G47.33 DME - DURABLE MEDICAL EQUIPMENT    Z99.89       2. Chronic respiratory failure with hypoxia ([facility] currently on oxygen at 3 L/min continuously J96.11 DME - DURABLE MEDICAL EQUIPMENT      3. Difficulty with CPAP use, will attempt a different mask and see if will improve her CPAP tolerance Z78.9       4. Pulmonary hypertension ([facility] severe and currently being treated with Tyvaso, tadalafil and Demadex plus oxygen supplement I27.20       5.  Hypersomnolence, related to untreated sleep apnea and chronic sleep disruption G47.10              PLAN:    We will continue CPAP at 7-10 cm with humidity and EPR 3    Proper sleep hygiene are strongly recommended along with positional therapy    Appropriate handout regarding sleep hygiene, sleep education will be provided to the patient    Renew her mask and supplies order request another mask fitting    Maintain her follow-up with pulmonary and primary care    Return to the sleep center in 3 months or sooner if needed to check her compliance at that time    Pathophysiology of sleep apnea and other treatment option were discussed with the patient and her  and her questions were answered properly      Orders Placed This Encounter   Procedures    DME - [address] [facility],[address] [place] [address] PULMONARY AND CRITICAL CARE  Phone: [address] [place] [address]  Dept: [phone]      Patient Name: Daysi Cardenas  : 1945  Gender: female  Address: [address] [place] [id number]   Patient phone: [phone] (home) [phone] (work)      Primary Insurance: Payor: Wallace Rodriguez / Plan: Laurent [employer] PLUS HMO / Product Type: *No Product type* /   Subscriber ID: [id number] - (Medicare Managed)      AMB Supply Order  Order Details     DME Location:    Order Date: 2023   The primary encounter diagnosis was HAO on CPAP. A diagnosis of Chronic respiratory failure with hypoxia (HCC) was also pertinent to this visit. (  X   )Supplies Needed         Machine   (     ) CPAP Unit  (   x  ) Auto CPAP Unit  (     ) BiLevel Unit  (     ) Auto BiLevel Unit  (     ) ASV        (     ) Bilevel ST      Length of need: 12 months    Pressure:  7-10 cmH20  EPR: 3    Starting Ramp Pressure:    cm H20  Ramp Time: min         Patient had a diagnostic Apnea Hypopnea Index (AHI) of :   11.9/hr  *SUPPLIES* Replace all as needed, or per coverage guidelines     Masks Type: Please schedule this patient for mask fitting and try her on P30 I mask or N30 I mask to improve her CPAP tolerance. She cannot tolerate the DreamWear system.   (    ) -Full Face Mask (1 per 3 mon)  (    ) -Full Mask (1 per month) Interface/Cushion      ( x ) -Nasal Mask (1 per 3 mon)  ( x  ) - Nasal Mask (1 per month) Interface/Cushion  (  x   ) -Pillow (2 per mon)  (     ) -Qjcxdshdq (1 per 6 mon)            Other Supplies:    (   X  )-Wvnpeepr (1 per 6 mon)  (   X  )-Etfzlp Tubing (1 per 3 mon)  (   X  )- Disposable Filter (2 per mon)  (   x  )-Clenlb Humidifier (1 per year)     (  x   )-Zqozvlujm (sometimes used with Full Face Mask) (1 per 6 mos)  (    )-Tubing-without heat (1 per 3 mos)  (     )-Non-Disposable Filter (1 per 6 mos)  (  x   )-Water Chamber (1 per 6 mos)  ( )-Humidifier non-heated (1 per 5 yrs)      Signed Date: 2/20/2023  Electronically Signed By: Fito Osullivan MD  Electronically Dated:  2/20/2023               No orders of the defined types were placed in this encounter. Over 50% of today's office visit was spent in face to face time reviewing test results, prognosis, importance of compliance, education about disease process, benefits of medications, instructions for management of acute flare-ups, and follow up plans. Total face to face time spent with patient and charting was 43 minutes.     Fito Osullivan MD  Electronically signed

## 2023-02-20 ENCOUNTER — OFFICE VISIT (OUTPATIENT)
Dept: SLEEP MEDICINE | Age: 78
End: 2023-02-20
Payer: MEDICARE

## 2023-02-20 VITALS
RESPIRATION RATE: 14 BRPM | OXYGEN SATURATION: 95 % | SYSTOLIC BLOOD PRESSURE: 136 MMHG | WEIGHT: 158 LBS | BODY MASS INDEX: 28 KG/M2 | DIASTOLIC BLOOD PRESSURE: 74 MMHG | TEMPERATURE: 97.5 F | HEIGHT: 63 IN | HEART RATE: 74 BPM

## 2023-02-20 DIAGNOSIS — Z99.89 OSA ON CPAP: Primary | ICD-10-CM

## 2023-02-20 DIAGNOSIS — Z78.9 DIFFICULTY WITH CPAP USE: ICD-10-CM

## 2023-02-20 DIAGNOSIS — G47.10 HYPERSOMNOLENCE: ICD-10-CM

## 2023-02-20 DIAGNOSIS — G47.33 OSA ON CPAP: Primary | ICD-10-CM

## 2023-02-20 DIAGNOSIS — I27.20 PULMONARY HYPERTENSION (HCC): ICD-10-CM

## 2023-02-20 DIAGNOSIS — J96.11 CHRONIC RESPIRATORY FAILURE WITH HYPOXIA (HCC): ICD-10-CM

## 2023-02-20 PROCEDURE — G8427 DOCREV CUR MEDS BY ELIG CLIN: HCPCS | Performed by: INTERNAL MEDICINE

## 2023-02-20 PROCEDURE — 1123F ACP DISCUSS/DSCN MKR DOCD: CPT | Performed by: INTERNAL MEDICINE

## 2023-02-20 PROCEDURE — G8400 PT W/DXA NO RESULTS DOC: HCPCS | Performed by: INTERNAL MEDICINE

## 2023-02-20 PROCEDURE — 1036F TOBACCO NON-USER: CPT | Performed by: INTERNAL MEDICINE

## 2023-02-20 PROCEDURE — 1090F PRES/ABSN URINE INCON ASSESS: CPT | Performed by: INTERNAL MEDICINE

## 2023-02-20 PROCEDURE — G8484 FLU IMMUNIZE NO ADMIN: HCPCS | Performed by: INTERNAL MEDICINE

## 2023-02-20 PROCEDURE — 99215 OFFICE O/P EST HI 40 MIN: CPT | Performed by: INTERNAL MEDICINE

## 2023-02-20 PROCEDURE — G8417 CALC BMI ABV UP PARAM F/U: HCPCS | Performed by: INTERNAL MEDICINE

## 2023-02-20 RX ORDER — TORSEMIDE 10 MG/1
TABLET ORAL
COMMUNITY
Start: 2023-01-23

## 2023-02-20 RX ORDER — METFORMIN HYDROCHLORIDE 500 MG/1
TABLET, EXTENDED RELEASE ORAL
COMMUNITY
Start: 2022-12-21

## 2023-02-20 NOTE — PATIENT INSTRUCTIONS
Sleep Hygiene Instructions    Sleep only as much as you need to feel refreshed during the following day. Restricting your time in bed helps to consolidate and deepen your sleep. Excessively long times in bed lead to fragmented and shallow sleep. Get up at your regular time the next day, no matter how little your slept. Get up at the same time each day, 7 days a week. A regular wake time in the morning leads to regular times on sleep onset, and helps to set your biological clock. Exercise regularly. Schedule exercise times so that they do not occur within 3 hours of when you intend to go to bed. Exercise makes it easier to initiate sleep and deepen sleep. Don't take your problems to bed. Plan some time earlier in the evening for working on your problems or planning the next day's activities. Worrying may interfere with initiating sleep and produce shallow sleep. Train yourself to use the bedroom only for sleep and sexual activity. This will help condition your brain to see bed as the place for sleeping. Do not read, watch TV or eat in bed. Do not try and fall asleep. This only makes the problem worse. Instead, turn on the light, leave the bedroom, and do something different like reading a book. Don't engage in stimulating activity. Return to bed only when you feel sleepy. Avoid long naps. Staying awake during the day helps to fall asleep at night. Naps totalling more than 30 minutes increase your chances of having trouble sleeping at night. Make sure that your bedroom is comfortable and free from light and noise. A comfortable, noise-free sleep environment will reduce the likelihood that you will wake up during the night. Noise that does not awaken you may disturb the quality of your sleep. Carpeting, insulated curtains, and closing the door may help. Make sure that your bedroom is at a comfortable temperature during the night.  Excessively warm or cold sleep environments may disturb sleep. Eat regular meals and di not go to bed hungry. Hunger may disturb sleep. A light snack at bedtime (especially carbohydrates) may help sleep, but avoid greasy or heavy foods. Avoid excessive liquids in the evening. Reducing liquid intake will minimize the need for night-time trips to the bathroom. Cut down on all caffeine products. Caffeinated beverages and food (Coffee, tea, cola, chocolate) can cause difficulty falling asleep, awakenings during the night, and shallow sleep. Even caffeine early in the day can disrupt night-time sleep. Avoid alcohol, especially in the evening. Although alcohol helps tense people fall asleep more easily, it causes awakenings later in the night. Smoking may disturb sleep. Nicotine is a stimulant. Try not to smoke during the night when you have trouble sleeping. Learning about Sleeping Well    What does sleeping well mean? Sleeping well means getting enough sleep. How much sleep is enough varies among people. The number of hours you sleep is not as improtant as how you feel when you wake up. If you do not feel refreshed, you probably need more sleep. Another sign of not getting enough sleep is feeling tired during the day. The average totally nightly sleep time is 7 1/2 to 8 hours. Healthy adults may need a little more or a little less than this. Why is getting enough sleep important? Getting enough quality sleep is a basic part of good health. When your sleep suffers, your mood and your thoughts can suffer too. Your thoughts can suffer too. You ma find yourself feeling more grumpy or stressed. No getting enough sleep also can lead to serious problems, including injury, accidents, anxiety, and depression. What might cause poor sleeping? Many things can cause sleep problems, including:    Stress. Stress can be caused by fear about a single event, such as giving a speech.   Or you may have ongoing stress, such as worry about work or school. Depression, anxiety, and other mental or emotional conditions. Changes in your sleep habits or surroundings. This includes changes that happen where you sleep, such as noise, light, or sleeping in a different bed. It also includes changes in your sleep pattern, such as having jet lab or working a late shift. Health problems, such as pain, breathing problems, and restless legs syndrome. Lack of regular exercise. How can you help yourself? Here are some tips that may help you sleep more soundly and wake up feeling more refreshed. Your sleeping area    Use your bedroom only for sleeping and sex. A bit of light reading may help you fall asleep. But if it doesn't, do your reading elsewhere in the house. Don't watch TV in bed. Be sure your bed is big enough to stretch out comfortable, especially if you have a sleep partner. Keep your bedroom quiet, dark, and cool. Use curtains, blinds, or sleep mask to block out light. To block out noise, use earplugs, soothing music, or a \"white noise\" machine. Your evening and bedtime routine    Create a relaxing bedtime routine. You might want to take a warm shower or bath, listen to soothing music, or drink a cup of non-caffeinated tea. Go to bed at the same time every night. And get up at the same time every morning, even if you feel tired. What to avoid:    Limit caffeine (coffee, tea, caffeinated sodas) during the day, and dont have any for at least 4 to 6 hours before bedtime. Don't drink alcohol before bedtime. Alcohol can cause you to wake up more often during the night. Don't smoke or use tobacco, especially in the evening. Nicotine can keep you awake. Don't like in bed away for too long. If you can't fall asleep, or if you wake up in the middle of the night and can't get back to sleep within 15 minutes or so, get out of bed and go to another room until you feel sleepy.     Don't take medicine right before bed that may keep you awake or make you feel hyper or enegerized. Your doctor can tell you if your medicine may do this and if you can take it earlier in the day. If you can't sleep:    Imagine yourself in a peaceful, pleasant scene. Focus on the details and feelings of being in a place that is relaxing. Get up and do a quiet or boring activity until you feel sleepy. Don't drink liquids after 6 p.m. if you wake up often because you have to go to the bathroom. Where can you learn more? Go to http://www.gray.com/    Enter J176 in the search box to learn more about \"Learning About Sleeping Well. \"

## 2023-02-21 ENCOUNTER — CLINICAL DOCUMENTATION (OUTPATIENT)
Dept: SLEEP MEDICINE | Age: 78
End: 2023-02-21

## 2023-02-21 RX ORDER — ALBUTEROL SULFATE 2.5 MG/3ML
2.5 SOLUTION RESPIRATORY (INHALATION) EVERY 6 HOURS PRN
Qty: 120 EACH | Refills: 11 | Status: SHIPPED | OUTPATIENT
Start: 2023-02-21

## 2023-02-24 DIAGNOSIS — J84.9 INTERSTITIAL LUNG DISEASE (HCC): ICD-10-CM

## 2023-02-27 RX ORDER — ALBUTEROL SULFATE 2.5 MG/3ML
2.5 SOLUTION RESPIRATORY (INHALATION) EVERY 6 HOURS PRN
Qty: 120 EACH | Refills: 11 | Status: SHIPPED | OUTPATIENT
Start: 2023-02-27

## 2023-03-06 ASSESSMENT — ENCOUNTER SYMPTOMS
SHORTNESS OF BREATH: 1
COUGH: 1
NAUSEA: 0
CONSTIPATION: 0
CHEST TIGHTNESS: 0
WHEEZING: 1
ABDOMINAL PAIN: 0
DIARRHEA: 0
VOMITING: 0
APNEA: 0

## 2023-03-06 NOTE — PROGRESS NOTES
Dr. Kelsi Jimenez MD  [address] [facility]. [address], [address] [facility]  [phone]    Patient Name:  Mikie Martin  YOB: 1945  Office Visit: 3/9/2023    ASSESSMENT AND PLAN:  (Medical Decision Making)  Mikie Martin is a 68 y.o. female with isolated precapillary pulmonary hypertension on Tyvaso 12puffs qid, sildenafil 20mg tid, and torsemide 20mg daily. She is doing well at pulmonary rehab, though her 6-minute walk testing was declined with a distance of 261 m earlier this month. CT of chest was reviewed and does not show worsening of fibrotic changes from sarcoidosis though a mediastinal LN is more prominent. 1. Sarcoidosis  Concerned about possible flare given worsening knee pain, significant restriction on last PFT and worsening hypoxemia. We will obtain high-resolution CT, ACE level, CMP, CBC and rheumatology evaluation. - CT CHEST HIGH RESOLUTION; Future  - Sedimentation Rate; Future  - Angiotensin Converting Enzyme; Future  - Comprehensive Metabolic Panel; Future  - [facility], , Rheumatology, [place]  - sodium chloride, Inhalant, 3 % nebulizer solution; Take 4 mLs by nebulization as needed for Other (sputum that is excessive)  Dispense: 240 mL; Refill: 11  - CBC with Auto Differential; Future  - Full PFT Study With Bronchodilator; Future    2. Fibrosis of lung ([address]  With some increasing bronchiectasis and sputum production. Will add hypertonic saline nebulizers to try to help with airway clearance. - sodium chloride, Inhalant, 3 % nebulizer solution; Take 4 mLs by nebulization as needed for Other (sputum that is excessive)  Dispense: 240 mL; Refill: 11  - Full PFT Study With Bronchodilator; Future    3. Pulmonary hypertension (HCC)  Continue Tyvaso 64 mcg 4 times daily, tadalafil 20 mg daily and add macitentan 10 mg daily. Her medication has been approved the drug company is only awaiting a phone call to ship it to her.   - Comprehensive Metabolic Panel; Future  - Full PFT Study With Bronchodilator; Future    4. Dyspnea, unspecified type  Check BNP. 6-minute walk test stable since September, though there was a major drop-off in her distance between 2021 and 2022. As above we plan to investigate sarcoidosis. She may need prednisone therapy. - Brain Natriuretic Peptide; Future    Orders Placed This Encounter   Medications    sodium chloride, Inhalant, 3 % nebulizer solution     Sig: Take 4 mLs by nebulization as needed for Other (sputum that is excessive)     Dispense:  240 mL     Refill:  11     Fill ONLY upon patient request please. No orders of the defined types were placed in this encounter. Follow-up and Dispositions    Return in about 4 weeks (around 4/6/2023) for With me or Chelly Gutierrez. Israel Marrero MD    Total time for encounter on day of encounter was 45 minutes. This time includes chart prep, review of tests/procedures, review of other provider's notes, documentation and counseling patient regarding disease process and medications. _____________________________________________________________________________________________________________________    Reason for Visit:  Follow-up (Pulmonary Hypertension)    History of Present Illness:     Janice Noble is a 71yoF with h/o fibronodular sarcoidosis (FVC 56%; bx of neck cervical node in 20s), prior hemoptysis from the lingular segment with embolization by Dr. Slava Stockton on 1/6/21 (left bronchial artery was hypertrophied), severe precapillary pulmonary hypertension (mPAP 39, PVR 8.8). Symptoms of lower extremity edema occurred after embolization prompted evaluation for PH. The LVEDP and PCWP were low, ruling out PH due to left heart disease. She has had problems with leg swelling and fatigue as well as chronic hypoxemic respiratory failure. She is now on 3lpm of continuous oxygen or 4-5lpm pulse. No prior use of anorexigens, no chemo drugs or other medications known to cause PH. Worked as an  without occupational exposures. She is a never smoker. Her V/Q scan did not suggest CTEPH. She has mild sleep apnea and is supposed to be on CPAP but hasn't been using due to poor mask tolerance. She has     She has a worsened cough quality and her cough kept her up last night. Her R knee is causing her more pain. She is requiring more oxygen with her 6 minute walk test, which dropped last September and hasn't recovered. No signs of worsening PH on last echo and I'm concerned her sarcoidosis may be flaring. PH medications  Tyvaso 64mcg qid - causing  Tadalafil 20mg daily  (40mg made her have pains in her muscles). No current diuretics        Tobacco Use: Low Risk     Smoking Tobacco Use: Never    Smokeless Tobacco Use: Never    Passive Exposure: Not on file       Review of Systems:  Review of Systems   Constitutional:  Positive for fatigue. Negative for chills, fever and unexpected weight change. Respiratory:  Positive for cough, shortness of breath and wheezing. Negative for apnea and chest tightness. Cardiovascular:  Negative for chest pain, palpitations and leg swelling. Gastrointestinal:  Negative for abdominal pain, constipation, diarrhea, nausea and vomiting. Neurological:  Negative for dizziness, tremors, seizures, weakness and headaches. Physical exam:    Vitals:    03/09/23 0930   BP: 116/72   Pulse: 71   Resp: 18   Temp: 97 °F (36.1 °C)   SpO2: 96%     Weight Metrics 3/9/2023 2/20/2023 12/15/2022 12/13/2022 12/7/2022 11/29/2022 11/1/2022   Weight 160 lb 158 lb 159 lb 153 lb - 153 lb 158 lb 3.2 oz   Waist Measure Inches - - - - - - -   Body Fat % - - - - 29.5 - -   BMI (Calculated) 28.4 kg/m2 28 kg/m2 28.2 kg/m2 27.2 kg/m2 - 0 kg/m2 0 kg/m2       Body mass index is 28.34 kg/m². General Appearance: The patient is pleasant and in no respiratory distress. HEENT: PERRLA. Conjunctivae unremarkable.   Nasal mucosa is without epistaxis, exudate, or polyps. Neck/Lymphatic:  Symmetrical with no elevation of jugular venous pulsation. Trachea midline. No thyroid enlargement. No cervical adenopathy. Lungs:  Normal respiratory effort with symmetrical lung expansion. Breath sounds with few crackles bilaterally  Heart:  RRR  Abdomen:  Soft and non-tender. No hepatosplenomegaly. Bowel sounds are normal.    Extremity: R knee is swollen. Fingers appear slightly swollen as well. Neuro: The patient is alert and oriented to person, place, and time. Memory appears intact and mood is normal.  No gross sensorimotor deficits are present. DIAGNOSTIC TESTS:                                                                                                             Pulmonary function testing:  Moderate restriction with severely reduced diffusing capacity    01/10/2019  5/6/2021 9/8/2022   FVC 1.12-(50%)  1.02 (43%) 1.03 (43%)   FEV1 0.96-(56%)  0.85 (47%) 0.84 (47%)   FEV1/FVC 0.86  0.83 0.81   TLC    2.54 (55%) 2.65 (57%)   FRC     1.72 (65%)   RV     1.62 (75%)   DLCO    8.3 (41%) 6.8 (34%)        6MWT 10/8/18 04/29/2021 09/20/2021 9/8/22 3/9/23   distance 365m 323m 336m 261 meters 255 m (64%)   Start;end O2 sat 96%;87% 98;91 100%;94% 98%; 82% 96%; 89%   Max CIERA dyspnea 0 3 2;10 0;7 7; 8 GH   COMMENTS   Pt walked with poc at 3lpm Pt walked with 3lpm  5lpm pulse  R knee pain Leg pain on 6 L        Right Heart Cath  4/5/2021   RA 6   RV 65/6   PA 75/21 (mPAP 39)   PCWP 7   CO 3.6   PVR 8.8   LVEDP 5      Negative vasoreactivity test 4/5/21  Adenosine PA (mPAP) CO   50 74/23 (40) 4.0   100 75/21  (39)  4.2   150 66/18 (34)  3.9   200 73/18 (36)  4.5      TTE  3/16/2021 12/2/21 12/13/22 (tyvaso/tadalafil)   LV  EF 60 to 65%; indeterminate diastolic function  EF 60 to 65% EF 60-65%  Abnormal diastolic function   RV  mildly dilated, normal systolic function  normal cavity size and global systolic function    Peak TRV  4.3m/s  3.6 m/s 3.46m/s   RVSP  75  55 mmHg 51mmHg   COMMENTS  normal valves   Dilated LA and RA         Oximetry and/or sleep study result 5/2021:      Chest CT 1/5/21:  IMPRESSION: Bronchiectasis and fibrosis in both lungs consistent with history of sarcoidosis. V/Q Scan 4/27/21:   1. Low probability for pulmonary embolism. 2. Multifocal matched perfusion defects, likely consequent to the patient's  known chronic sarcoidosis and multifocal chronic fibrotic changes demonstrated  on recent chest CT. Lab Diagnostics:  HIV neg  HCV neg  Direct DARRIUS positive:  AntiRNP ab 3.7 (slightly elevated). AntidsDNA neg  NT pro- on 3/12/21  LFTs normal     AMBULATING OXIMETRY: 9/20/2021 O2 sat HR   Room air at Rest  97%  69 bpm   Room air ambulating  83%  73 bpm   (recovery) Ambulating on 2 Lpm  94%  59 bpm        Sulfa antibiotics  Current Outpatient Medications   Medication Sig    albuterol (PROVENTIL) (2.5 MG/3ML) 0.083% nebulizer solution Take 3 mLs by nebulization every 6 hours as needed for Wheezing    torsemide (DEMADEX) 10 MG tablet 10mg DAILY    metFORMIN (GLUCOPHAGE-XR) 500 MG extended release tablet TAKE 1 TABLET BY MOUTH ONCE DAILY    Tadalafil, PAH, 20 MG tablet Take 1 tablet by mouth daily    Treprostinil 64 MCG POWD Inhale 64 mcg into the lungs in the morning, at noon, in the evening, and at bedtime    brimonidine-timolol (COMBIGAN) 0.2-0.5 % ophthalmic solution 1 drop every 12 hours    Calcium Carbonate-Vitamin D (CALCIUM-VITAMIN D) 600-125 MG-UNIT TABS Take by mouth    Cholecalciferol 50 MCG (2000 UT) TABS Take 2,000 Units by mouth daily     No current facility-administered medications for this visit.      Past Medical History:   Diagnosis Date    Arthritis     osteo    Glaucoma     Ill-defined condition     Pulmonary HTN    Pulmonary hypertension (HCC)     Sarcoidosis     Sarcoidosis of lung ([address]      Family History   Problem Relation Age of Onset    Hypertension Sister     Elevated Lipids Father     Hypertension Sister     Diabetes Maternal Grandmother     Diabetes Mother     Hypertension Mother     Diabetes Maternal Grandfather     Cancer Mother     Breast Cancer Mother 79    High Cholesterol Sister     High Cholesterol Mother

## 2023-03-09 ENCOUNTER — NURSE ONLY (OUTPATIENT)
Dept: PULMONOLOGY | Age: 78
End: 2023-03-09

## 2023-03-09 ENCOUNTER — OFFICE VISIT (OUTPATIENT)
Dept: PULMONOLOGY | Age: 78
End: 2023-03-09
Payer: MEDICARE

## 2023-03-09 VITALS
OXYGEN SATURATION: 96 % | WEIGHT: 160 LBS | HEART RATE: 71 BPM | RESPIRATION RATE: 18 BRPM | TEMPERATURE: 97 F | SYSTOLIC BLOOD PRESSURE: 116 MMHG | HEIGHT: 63 IN | BODY MASS INDEX: 28.35 KG/M2 | DIASTOLIC BLOOD PRESSURE: 72 MMHG

## 2023-03-09 DIAGNOSIS — J84.10 FIBROSIS OF LUNG (HCC): ICD-10-CM

## 2023-03-09 DIAGNOSIS — J44.9 CHRONIC OBSTRUCTIVE PULMONARY DISEASE, UNSPECIFIED COPD TYPE (HCC): Primary | ICD-10-CM

## 2023-03-09 DIAGNOSIS — I27.20 PULMONARY HYPERTENSION (HCC): ICD-10-CM

## 2023-03-09 DIAGNOSIS — R06.00 DYSPNEA, UNSPECIFIED TYPE: ICD-10-CM

## 2023-03-09 DIAGNOSIS — D86.9 SARCOIDOSIS: ICD-10-CM

## 2023-03-09 DIAGNOSIS — D86.9 SARCOIDOSIS: Primary | ICD-10-CM

## 2023-03-09 LAB
ALBUMIN SERPL-MCNC: 3.1 G/DL (ref 3.2–4.6)
ALBUMIN/GLOB SERPL: ABNORMAL (ref 0.4–1.6)
ALP SERPL-CCNC: 74 U/L (ref 50–136)
ALT SERPL-CCNC: 25 U/L (ref 12–65)
ANION GAP SERPL CALC-SCNC: 0 MMOL/L (ref 2–11)
AST SERPL-CCNC: 29 U/L (ref 15–37)
BASOPHILS # BLD: 0 K/UL (ref 0–0.2)
BASOPHILS NFR BLD: 1 % (ref 0–2)
BILIRUB SERPL-MCNC: 0.2 MG/DL (ref 0.2–1.1)
BUN SERPL-MCNC: 20 MG/DL (ref 8–23)
CALCIUM SERPL-MCNC: 9.9 MG/DL (ref 8.3–10.4)
CHLORIDE SERPL-SCNC: 97 MMOL/L (ref 101–110)
CO2 SERPL-SCNC: 40 MMOL/L (ref 21–32)
CREAT SERPL-MCNC: 0.7 MG/DL (ref 0.6–1)
DIFFERENTIAL METHOD BLD: ABNORMAL
EOSINOPHIL # BLD: 0.2 K/UL (ref 0–0.8)
EOSINOPHIL NFR BLD: 4 % (ref 0.5–7.8)
ERYTHROCYTE [DISTWIDTH] IN BLOOD BY AUTOMATED COUNT: 14.2 % (ref 11.9–14.6)
ERYTHROCYTE [SEDIMENTATION RATE] IN BLOOD: 67 MM/HR (ref 0–30)
GLOBULIN SER CALC-MCNC: ABNORMAL G/DL (ref 2.8–4.5)
GLUCOSE SERPL-MCNC: 150 MG/DL (ref 65–100)
HCT VFR BLD AUTO: 33 % (ref 35.8–46.3)
HGB BLD-MCNC: 9.6 G/DL (ref 11.7–15.4)
IMM GRANULOCYTES # BLD AUTO: 0 K/UL (ref 0–0.5)
IMM GRANULOCYTES NFR BLD AUTO: 0 % (ref 0–5)
LYMPHOCYTES # BLD: 2.2 K/UL (ref 0.5–4.6)
LYMPHOCYTES NFR BLD: 44 % (ref 13–44)
MCH RBC QN AUTO: 26.4 PG (ref 26.1–32.9)
MCHC RBC AUTO-ENTMCNC: 29.1 G/DL (ref 31.4–35)
MCV RBC AUTO: 90.7 FL (ref 82–102)
MONOCYTES # BLD: 0.5 K/UL (ref 0.1–1.3)
MONOCYTES NFR BLD: 10 % (ref 4–12)
NEUTS SEG # BLD: 2.1 K/UL (ref 1.7–8.2)
NEUTS SEG NFR BLD: 41 % (ref 43–78)
NRBC # BLD: 0 K/UL (ref 0–0.2)
NT PRO BNP: 93 PG/ML
PLATELET # BLD AUTO: 200 K/UL (ref 150–450)
PMV BLD AUTO: 10.8 FL (ref 9.4–12.3)
POTASSIUM SERPL-SCNC: 4 MMOL/L (ref 3.5–5.1)
PROT SERPL-MCNC: 7.8 G/DL (ref 6.3–8.2)
RBC # BLD AUTO: 3.64 M/UL (ref 4.05–5.2)
SODIUM SERPL-SCNC: 137 MMOL/L (ref 133–143)
WBC # BLD AUTO: 5.1 K/UL (ref 4.3–11.1)

## 2023-03-09 PROCEDURE — 1123F ACP DISCUSS/DSCN MKR DOCD: CPT | Performed by: INTERNAL MEDICINE

## 2023-03-09 PROCEDURE — G8484 FLU IMMUNIZE NO ADMIN: HCPCS | Performed by: INTERNAL MEDICINE

## 2023-03-09 PROCEDURE — G8427 DOCREV CUR MEDS BY ELIG CLIN: HCPCS | Performed by: INTERNAL MEDICINE

## 2023-03-09 PROCEDURE — 1090F PRES/ABSN URINE INCON ASSESS: CPT | Performed by: INTERNAL MEDICINE

## 2023-03-09 PROCEDURE — G8417 CALC BMI ABV UP PARAM F/U: HCPCS | Performed by: INTERNAL MEDICINE

## 2023-03-09 PROCEDURE — 99215 OFFICE O/P EST HI 40 MIN: CPT | Performed by: INTERNAL MEDICINE

## 2023-03-09 PROCEDURE — G8400 PT W/DXA NO RESULTS DOC: HCPCS | Performed by: INTERNAL MEDICINE

## 2023-03-09 PROCEDURE — 1036F TOBACCO NON-USER: CPT | Performed by: INTERNAL MEDICINE

## 2023-03-09 RX ORDER — SODIUM CHLORIDE FOR INHALATION 3 %
4 VIAL, NEBULIZER (ML) INHALATION PRN
Qty: 240 ML | Refills: 11 | Status: SHIPPED | OUTPATIENT
Start: 2023-03-09

## 2023-03-09 NOTE — PATIENT INSTRUCTIONS
Today we are checking on your sarcoidosis AND your pulmonary hypertension. Sarcoidosis:  Schedule lung function  A CT of the chest is due   Get labs done  Return in one month to see if we need to start prednisone  Refer to rheumatology    Pulmonary hypertension   Start Macitentan as soon as possible. Continue tyvaso and the tadalafil as you're doing.

## 2023-03-09 NOTE — PROGRESS NOTES
[address] [facility] [place]  [address] [facility], [address] [facility]  T: [phone]  F: [phone]       6 MINUTE WALK TEST     Patient's Name Mikie Martin   Age 68 y.o. Gender female   Height 63in   Weight 160lbs lbs   Ordering Provider  Shayna Núñez MD          Time Dyspnea  (Aj Scale)  Fatigue  (Aj Scale)  Blood Pressure Heart Rate Oxygen SAT RA or   w / O2? BASELINE 0915  7 7 116 / 72 71 96% 6L                                                                              POST TEST  0921 8 7 136 / 73 80 89% 6L     Does the patient use any walking aids? NO. Medications taken before test are up to date:  No  Supplemental oxygen during the test: YES - 6 LPM.     Stopped or paused before 6 minutes? No  Other symptoms at end of exercise: Leg Pain    Number of complete laps: 3.5 x 60 meters = 210 meters + final partial lap: 45 meters = 255 meters    Total distance walked in 6 minutes:  255  Meters  Predicted distance: 396 meters    Percent of predicted distance: 64 %                Tech comments: Patient walked for 6 minutes on 6L NC without stopping. Pt complained of leg pain.     Test Performed by: Ulus Leventhal, RCP

## 2023-03-10 ENCOUNTER — HOSPITAL ENCOUNTER (OUTPATIENT)
Dept: PULMONOLOGY | Age: 78
End: 2023-03-10
Payer: MEDICARE

## 2023-03-10 VITALS — RESPIRATION RATE: 16 BRPM | OXYGEN SATURATION: 96 % | HEART RATE: 77 BPM

## 2023-03-10 DIAGNOSIS — D86.9 SARCOIDOSIS: ICD-10-CM

## 2023-03-10 DIAGNOSIS — J84.10 FIBROSIS OF LUNG (HCC): ICD-10-CM

## 2023-03-10 DIAGNOSIS — I27.20 PULMONARY HYPERTENSION (HCC): ICD-10-CM

## 2023-03-10 LAB — ACE SERPL-CCNC: 110 U/L (ref 14–82)

## 2023-03-10 PROCEDURE — 94060 EVALUATION OF WHEEZING: CPT

## 2023-03-10 PROCEDURE — 94726 PLETHYSMOGRAPHY LUNG VOLUMES: CPT

## 2023-03-10 PROCEDURE — 94729 DIFFUSING CAPACITY: CPT

## 2023-03-12 RX ORDER — PREDNISONE 20 MG/1
TABLET ORAL
Qty: 120 TABLET | Refills: 0 | Status: SHIPPED | OUTPATIENT
Start: 2023-03-12 | End: 2023-06-10

## 2023-03-13 ENCOUNTER — TELEPHONE (OUTPATIENT)
Dept: PULMONOLOGY | Age: 78
End: 2023-03-13

## 2023-03-13 NOTE — TELEPHONE ENCOUNTER
Spoke to patient and went over lab results and gave her the number to call radiology to get ct chest scheduled so she can start on prednisone and to make sure keeps appt in April and she voices understanding.

## 2023-03-13 NOTE — TELEPHONE ENCOUNTER
----- Message from Manuel Guillen MD sent at 3/12/2023  1:04 PM EDT -----  The patient's ESR and ACE are currently elevated. I think we will need to start some prednisone and follow her more closely. She should call radiology scheduling to get her HRCT arranges ASAP. I will send in an order for prednisone 40mg daily and I think she should start it immediately following her CT chest.  She should continue for one month and then decrease to 20mg until her next visit. Sent the prednisone to [facility] and it should be affordable.

## 2023-03-20 ENCOUNTER — HOSPITAL ENCOUNTER (OUTPATIENT)
Dept: CT IMAGING | Age: 78
Discharge: HOME | End: 2023-03-23
Payer: MEDICARE

## 2023-03-20 DIAGNOSIS — D86.9 SARCOIDOSIS: ICD-10-CM

## 2023-03-20 PROCEDURE — 71250 CT THORAX DX C-: CPT

## 2023-03-24 ENCOUNTER — TELEPHONE (OUTPATIENT)
Dept: PULMONOLOGY | Age: 78
End: 2023-03-24

## 2023-03-24 NOTE — TELEPHONE ENCOUNTER
Last seen: 3/9/23  Hx: sarcoid, PAH, HAO    After visit had phone call 3/13 w/ results, started prednisone w/ taper instructions until f/u visit; now that f/u visit has been postponed (due to office schedule) patient is asking for refill. Please advise if f/u appt need to remain at 4 week cortez (with other provider possibly) or if refills should be sent for additional weeks of treatment.

## 2023-03-24 NOTE — TELEPHONE ENCOUNTER
Called patient to reschedule her appt with Dr. Lieutenant Eason. (Dr. Lieutenant Eason going to be out of office on 4/14/23.) New appt is 05/11/23 at 9:10am. Patient expressed concern about her Rx for prednisone. Stated that she was only on a 30 day supply and that she would need some before her new appointment date. Please call patient at home number to advise.

## 2023-03-24 NOTE — TELEPHONE ENCOUNTER
I called pt and she is concerned that she was instructed to take prednisone 40mg for a month and then was to be seen before weaning the prednisone. She does not think that she will need more medication but wants Dr. Lori Lopez to determine her dose. Pt is aware that Dr. Lori Lopez is out of the office but I will contact pt back on Monday after speaking with Dr. Lori Lopez. Pt agrees with this plan.

## 2023-03-27 NOTE — TELEPHONE ENCOUNTER
I spoke with Dr. Nicole Epps and she agrees with plan to wean to 20mg Prednisone after one month of 40mg and see how she is doing at her follow up appt. Pt was made aware and is agreeable. Dr. Nicole Epps does want pt to have 6MWT the day of her appt 5/11. Can you get this set up for her?

## 2023-05-04 ASSESSMENT — ENCOUNTER SYMPTOMS
WHEEZING: 1
ABDOMINAL PAIN: 0
NAUSEA: 0
COUGH: 1
APNEA: 0
CHEST TIGHTNESS: 0
DIARRHEA: 0
CONSTIPATION: 0
VOMITING: 0
SHORTNESS OF BREATH: 1

## 2023-05-08 ENCOUNTER — OFFICE VISIT (OUTPATIENT)
Dept: PULMONOLOGY | Age: 78
End: 2023-05-08
Payer: MEDICARE

## 2023-05-08 ENCOUNTER — NURSE ONLY (OUTPATIENT)
Dept: PULMONOLOGY | Age: 78
End: 2023-05-08
Payer: MEDICARE

## 2023-05-08 VITALS
SYSTOLIC BLOOD PRESSURE: 116 MMHG | HEIGHT: 63 IN | OXYGEN SATURATION: 92 % | BODY MASS INDEX: 28.88 KG/M2 | RESPIRATION RATE: 18 BRPM | WEIGHT: 163 LBS | TEMPERATURE: 97 F | DIASTOLIC BLOOD PRESSURE: 62 MMHG | HEART RATE: 82 BPM

## 2023-05-08 DIAGNOSIS — I27.20 PULMONARY HYPERTENSION (HCC): ICD-10-CM

## 2023-05-08 DIAGNOSIS — R06.00 DYSPNEA, UNSPECIFIED TYPE: Primary | ICD-10-CM

## 2023-05-08 DIAGNOSIS — I27.20 PULMONARY HYPERTENSION (HCC): Primary | ICD-10-CM

## 2023-05-08 DIAGNOSIS — R06.00 DYSPNEA, UNSPECIFIED TYPE: ICD-10-CM

## 2023-05-08 DIAGNOSIS — D86.9 SARCOIDOSIS: ICD-10-CM

## 2023-05-08 PROCEDURE — G8417 CALC BMI ABV UP PARAM F/U: HCPCS | Performed by: INTERNAL MEDICINE

## 2023-05-08 PROCEDURE — 94618 PULMONARY STRESS TESTING: CPT | Performed by: INTERNAL MEDICINE

## 2023-05-08 PROCEDURE — G8400 PT W/DXA NO RESULTS DOC: HCPCS | Performed by: INTERNAL MEDICINE

## 2023-05-08 PROCEDURE — 1036F TOBACCO NON-USER: CPT | Performed by: INTERNAL MEDICINE

## 2023-05-08 PROCEDURE — 1090F PRES/ABSN URINE INCON ASSESS: CPT | Performed by: INTERNAL MEDICINE

## 2023-05-08 PROCEDURE — G8427 DOCREV CUR MEDS BY ELIG CLIN: HCPCS | Performed by: INTERNAL MEDICINE

## 2023-05-08 PROCEDURE — 99215 OFFICE O/P EST HI 40 MIN: CPT | Performed by: INTERNAL MEDICINE

## 2023-05-08 PROCEDURE — 1123F ACP DISCUSS/DSCN MKR DOCD: CPT | Performed by: INTERNAL MEDICINE

## 2023-05-08 RX ORDER — MACITENTAN 10 MG/1
TABLET, FILM COATED ORAL DAILY
COMMUNITY

## 2023-05-08 RX ORDER — PREDNISONE 10 MG/1
TABLET ORAL
Qty: 45 TABLET | Refills: 0 | Status: SHIPPED | OUTPATIENT
Start: 2023-05-08

## 2023-05-08 NOTE — PATIENT INSTRUCTIONS
Goal #1:  Get off prednisone. Starting Tuesday 5/9:  take 20mg prednisone daily x 10 days  Starting  Friday 5/19:  take 10mg prednisone daily x 10 days  Starting  Monday 5/29: take prednisone 5mg daily x 7 days  Starting Mon 6/5: take prednisone 5mg every other day x 2 weeks    Goal #2:  Stop the swelling. Get labs today so I know how I can safely treat this. Its possible that the macitentan/opsumit is the problem. In the meantime, plan to alternate torsemide 10mg tablet and 20mg tablet for one week to try to reduce the swelling. If you haven't heard from us about your diuretic plan by Tuesday afternoon please call and ask for Minor Schaumann so she can make sure we address it. Return in one month.

## 2023-05-08 NOTE — PROGRESS NOTES
[address] [facility] [place]  [address] [facility] [address] [facility]  T: [phone]  F: [phone]       6 MINUTE WALK TEST     Patient's Name Jeevan Fletcher   Age 68 y.o. Gender female   Height 63in   Weight 163 lbs   Ordering Provider  Henry Delgado MD          Time Dyspnea  (Aj Scale)  Fatigue  (Aj Scale)  Blood Pressure Heart Rate Oxygen SAT RA or   w / O2? BASELINE 10:24am  3 0 116/62 82 92 02 (5lpm)                                                                              POST TEST  10:30am 8 8 128/68 88 89 02 (5lpm)     Does the patient use any walking aids? NO. Medications taken before test are up to date:  No  Supplemental oxygen during the test: YES - 5 LPM.     Stopped or paused before 6 minutes?  No  Other symptoms at end of exercise: None    Number of complete laps: 4 x 60 meters = 240 meters + final partial lap: 0 meters = 240 meters    Total distance walked in 6 minutes:  240  Meters  Predicted distance: 390 meters    Percent of predicted distance: 62 %                Tech comments:    Test Performed by: Joseph Kent MA

## 2023-05-09 ENCOUNTER — TELEPHONE (OUTPATIENT)
Dept: PULMONOLOGY | Age: 78
End: 2023-05-09

## 2023-05-09 LAB
ANION GAP SERPL CALC-SCNC: 3 MMOL/L (ref 2–11)
BUN SERPL-MCNC: 15 MG/DL (ref 8–23)
CALCIUM SERPL-MCNC: 10.3 MG/DL (ref 8.3–10.4)
CHLORIDE SERPL-SCNC: 98 MMOL/L (ref 101–110)
CO2 SERPL-SCNC: 37 MMOL/L (ref 21–32)
CREAT SERPL-MCNC: 0.7 MG/DL (ref 0.6–1)
GLUCOSE SERPL-MCNC: 356 MG/DL (ref 65–100)
NT PRO BNP: 75 PG/ML
POTASSIUM SERPL-SCNC: 3.9 MMOL/L (ref 3.5–5.1)
SODIUM SERPL-SCNC: 138 MMOL/L (ref 133–143)

## 2023-05-18 ENCOUNTER — TELEPHONE (OUTPATIENT)
Dept: PULMONOLOGY | Age: 78
End: 2023-05-18

## 2023-05-18 NOTE — TELEPHONE ENCOUNTER
----- Message from Phuong Aldrich MD sent at 5/16/2023  5:49 PM EDT -----  Please let patient know I reviewed her labs and think she should alternate the torsemide 20mg /40mg for 2 weeks and then go back down to 20mg daily.   Phuong Aldrich MD

## 2023-05-18 NOTE — TELEPHONE ENCOUNTER
Spoke to patient and went over lab results and new instructions on torsemide and she voices understanding.

## 2023-05-24 ENCOUNTER — TELEPHONE (OUTPATIENT)
Dept: PULMONOLOGY | Age: 78
End: 2023-05-24

## 2023-06-02 ENCOUNTER — TELEPHONE (OUTPATIENT)
Dept: PULMONOLOGY | Age: 78
End: 2023-06-02

## 2023-06-02 NOTE — TELEPHONE ENCOUNTER
Left message for patient to call back. Per Dr. Ignatius Dakins patient needs an appt to come in office and made appt for 6/14/23.

## 2023-06-05 RX ORDER — AMOXICILLIN AND CLAVULANATE POTASSIUM 875; 125 MG/1; MG/1
1 TABLET, FILM COATED ORAL 2 TIMES DAILY
Qty: 14 TABLET | Refills: 0 | Status: SHIPPED | OUTPATIENT
Start: 2023-06-05 | End: 2023-06-12

## 2023-06-05 NOTE — TELEPHONE ENCOUNTER
Advised Dr. Malena Narayanan to disregard, direct conversation w/ Dr. Tammy Elizabeth; ok for amoxicillin 875 BID x 7 days, keep prednisone the same until appt. Advised patient.

## 2023-06-05 NOTE — TELEPHONE ENCOUNTER
Last seen: 5/8/23  Hx: fibronodular sarcoidosis, severe PAH, COPD    Call from patient reporting swollen lymph node w/ soreness; this is affecting her ear; having coughing & congestion. Contacted patient patient to determine symptoms, L neck and ear are swollen; notes she always has congestion & cough but intermittently worse; yesterday sputum was green today, but yellow today; no fevers; has been on prednisone, down to half tab (5mg?) every other day, this pattern is ordered for the next week; has appt w/ Dr. Amber Fagan 6/12. Warm compress helps w/ discomfort. Wearing O2 @ 3L continuously; edema at ankles held steady w/ torsemide, better after sleeping every am.    Please advise for next steps. Believes she needs an antibiotic.

## 2023-06-23 ENCOUNTER — TELEPHONE (OUTPATIENT)
Dept: PULMONOLOGY | Age: 78
End: 2023-06-23

## 2023-06-23 NOTE — TELEPHONE ENCOUNTER
Hemodynamic equipment used: 5 lead ECG, manual BP cuff, Machine BP Cuff and pulse oximeter probe. Received request from [facility] inquiring if Opsumit will be continued. D/C'd according to Dr. Izzy Renae last note.  Response faxed back to Rohit. /enrique

## 2023-08-20 ASSESSMENT — ENCOUNTER SYMPTOMS
WHEEZING: 1
DIARRHEA: 0
VOMITING: 0
SHORTNESS OF BREATH: 1
COUGH: 1
ABDOMINAL PAIN: 0
APNEA: 0
NAUSEA: 0
CHEST TIGHTNESS: 0
CONSTIPATION: 0

## 2023-08-21 NOTE — PROGRESS NOTES
alert and oriented to person, place, and time. Memory appears intact and mood is normal.  No gross sensorimotor deficits are present. DIAGNOSTIC TESTS:                                                                                                             Pulmonary function testing: Moderate restriction with severely reduced diffusing capacity    01/10/2019  5/6/2021 9/8/2022 3/10/23    FVC 1.12-(50%)  1.02 (43%) 1.03 (43%) 1.01 (47%)    FEV1 0.96-(56%)  0.85 (47%) 0.84 (47%) 0.75 (45%)    FEV1/FVC 0.86  0.83 0.81 0.74    TLC    2.54 (55%) 2.65 (57%) 2.85 (60%)    FRC     1.72 (65%) 2.29 (86%)    RV     1.62 (75%) 1.84 (87%)    DLCO    8.3 (41%) 6.8 (34%) 3.01 (16%)         6MWT 10/8/18 04/29/2021 09/20/2021 9/8/22 3/9/23 5/8/2023   distance 365m 323m 336m 261 meters 255 m (64%) 240 m   Start;end O2 sat 96%;87% 98;91 100%;94% 98%; 82% 96%; 89% 92%; 89%   Max CIERA dyspnea 0 3 2;10 0;7 7; 8 GH 3; 8   COMMENTS   Pt walked with poc at 3lpm Pt walked with 3lpm  5lpm pulse  R knee pain Leg pain on 6 L         Right Heart Cath  4/5/2021   RA 6   RV 65/6   PA 75/21 (mPAP 39)   PCWP 7   CO 3.6   PVR 8.8   LVEDP 5      Negative vasoreactivity test 4/5/21  Adenosine PA (mPAP) CO   50 74/23 (40) 4.0   100 75/21  (39)  4.2   150 66/18 (34)  3.9   200 73/18 (36)  4.5      TTE  3/16/2021 12/2/21 12/13/22 (tyvaso/tadalafil) 7/17/23  (tyvaso/tadalafil)   LV  EF 60 to 65%; indeterminate diastolic function  EF 60 to 65% EF 60-65%  Abnormal diastolic function EF 74-38%  Mildly increased wall thickness. Normal diastolic function. RV  mildly dilated, normal systolic function  normal cavity size and global systolic function  dilated. Mildly reduced systolic function.    Peak TRV  4.3m/s  3.6 m/s 3.46m/s 3.81 m/s   RVSP  75  55 mmHg 51mmHg 58mmHg   COMMENTS  normal valves   Dilated LA and RA Dilated RA and LA normal         Oximetry and/or sleep study result 5/2021:      Chest CT 1/5/21:  IMPRESSION: Bronchiectasis and fibrosis

## 2023-08-22 ENCOUNTER — OFFICE VISIT (OUTPATIENT)
Dept: PULMONOLOGY | Age: 78
End: 2023-08-22
Payer: MEDICARE

## 2023-08-22 VITALS
DIASTOLIC BLOOD PRESSURE: 64 MMHG | BODY MASS INDEX: 29.06 KG/M2 | HEART RATE: 77 BPM | OXYGEN SATURATION: 91 % | TEMPERATURE: 97.4 F | SYSTOLIC BLOOD PRESSURE: 110 MMHG | HEIGHT: 63 IN | RESPIRATION RATE: 18 BRPM | WEIGHT: 164 LBS

## 2023-08-22 DIAGNOSIS — R60.9 SWELLING: ICD-10-CM

## 2023-08-22 DIAGNOSIS — R06.00 DYSPNEA, UNSPECIFIED TYPE: ICD-10-CM

## 2023-08-22 DIAGNOSIS — I27.20 PULMONARY HYPERTENSION (HCC): Primary | ICD-10-CM

## 2023-08-22 DIAGNOSIS — D86.0 PULMONARY SARCOIDOSIS (HCC): ICD-10-CM

## 2023-08-22 PROCEDURE — G8399 PT W/DXA RESULTS DOCUMENT: HCPCS | Performed by: INTERNAL MEDICINE

## 2023-08-22 PROCEDURE — 1123F ACP DISCUSS/DSCN MKR DOCD: CPT | Performed by: INTERNAL MEDICINE

## 2023-08-22 PROCEDURE — G8427 DOCREV CUR MEDS BY ELIG CLIN: HCPCS | Performed by: INTERNAL MEDICINE

## 2023-08-22 PROCEDURE — 99215 OFFICE O/P EST HI 40 MIN: CPT | Performed by: INTERNAL MEDICINE

## 2023-08-22 PROCEDURE — 1036F TOBACCO NON-USER: CPT | Performed by: INTERNAL MEDICINE

## 2023-08-22 PROCEDURE — G8417 CALC BMI ABV UP PARAM F/U: HCPCS | Performed by: INTERNAL MEDICINE

## 2023-08-22 PROCEDURE — 1090F PRES/ABSN URINE INCON ASSESS: CPT | Performed by: INTERNAL MEDICINE

## 2023-08-29 ENCOUNTER — TELEPHONE (OUTPATIENT)
Dept: PULMONOLOGY | Age: 78
End: 2023-08-29

## 2023-08-29 DIAGNOSIS — D86.0 SARCOIDOSIS OF LUNG (HCC): ICD-10-CM

## 2023-08-29 DIAGNOSIS — J84.9 INTERSTITIAL LUNG DISEASE (HCC): ICD-10-CM

## 2023-08-29 DIAGNOSIS — I27.20 PULMONARY HYPERTENSION (HCC): Primary | ICD-10-CM

## 2023-08-29 NOTE — TELEPHONE ENCOUNTER
Called [facility]'s  and left a message for them to call back. Per Dr. Meng Deluna, looks like Mignon Moreno is part of the trial. She would like to see if they are still enrolling in the trial [id number]. She is interested in starting the patient on Sotatercept.      [facility], [address] [place] [address] ([id number]) [address] [place], [place], [address] [place] [address] Road: Jacoby Jackson, [place] Coordinator   [phone]  Email: [email or web address]

## 2023-09-07 NOTE — TELEPHONE ENCOUNTER
[employer] and spoke with Paramjit Pisano,  with [employer]. She states that the patient may not qualify for the Study due to the PCWP not being 15 or greater (hers is 7). She said she will talk with her manager and get back with us. She also said that the study would require close follow up in [place]. Dr. Braden Jamison will be informed.

## 2023-09-13 NOTE — TELEPHONE ENCOUNTER
Order faxed to [address] [place] [address] for Skilled nursing visits for breathing techniques for 1-3 visits. Fax #[phone].

## 2023-09-15 ENCOUNTER — TELEPHONE (OUTPATIENT)
Dept: PULMONOLOGY | Age: 78
End: 2023-09-15

## 2023-09-22 ENCOUNTER — NURSE ONLY (OUTPATIENT)
Dept: PULMONOLOGY | Age: 78
End: 2023-09-22

## 2023-09-22 ENCOUNTER — TELEPHONE (OUTPATIENT)
Dept: PULMONOLOGY | Age: 78
End: 2023-09-22

## 2023-09-22 DIAGNOSIS — I27.20 PULMONARY HYPERTENSION (HCC): Primary | ICD-10-CM

## 2023-09-22 RX ORDER — TORSEMIDE 20 MG/1
20 TABLET ORAL DAILY
Qty: 30 TABLET | Refills: 5 | Status: SHIPPED | OUTPATIENT
Start: 2023-09-22 | End: 2023-10-06

## 2023-09-22 NOTE — PROGRESS NOTES
Patient came by office for Blood Pressure check as instructed by Note on 9/15/23. BP 96/52  Weight 167lb    Confirmed patient has increased fluid intake and has been taking Demadex 10mg daily since 9/15/23 phone call. Has not taken todays dose yet. Last Tyvaso dose 10:15am today    Last check weight by patient on Wednesday 9/20/23 164lb    No additional shortness of breath or edema. Reports only shortness of breath when not wearing 02. Utilizing POC for visit today and arrived in wheelchair.     Next scheduled visit is 11/17/23 with 6MWT same day and BNP prior with Dr. Lorena Bob

## 2023-09-22 NOTE — TELEPHONE ENCOUNTER
Spoke directly to Dr. Sheyla Anand about blood pressure and she said to start back on 20mg Torsemide daily and continue Tyvaso. Patient notified and voices understanding on new instructions. She states she does not have any more 20mg tablets and is almost out of 10mg and needs Rx sent to [facility]

## 2023-09-25 ENCOUNTER — TELEPHONE (OUTPATIENT)
Dept: PULMONOLOGY | Age: 78
End: 2023-09-25

## 2023-09-26 NOTE — TELEPHONE ENCOUNTER
Contacting patient who states \"I guess I'm doing okay\". She states that he legs and ankles are not swollen but her stomach is. She is on 40/20 torsemide alternating days but feels like she is not urinating enough. She states that she is not moving around well. She is SOB with ambulation even with her oxygen on 4LPM. Pt is due for 6MWT and BNP in November with an OV. Pt was seen in late August. She has continued her diuretic regimen but has not reached her dry weight. Explained to pt that RN feels that she needs to be seen earlier than November. Pt able to come in tomorrow for Dr Maegan Haas at 750.

## 2023-09-27 ENCOUNTER — OFFICE VISIT (OUTPATIENT)
Dept: PULMONOLOGY | Age: 78
End: 2023-09-27
Payer: MEDICARE

## 2023-09-27 VITALS
TEMPERATURE: 97.6 F | DIASTOLIC BLOOD PRESSURE: 82 MMHG | HEIGHT: 63 IN | RESPIRATION RATE: 18 BRPM | BODY MASS INDEX: 29.06 KG/M2 | SYSTOLIC BLOOD PRESSURE: 134 MMHG | WEIGHT: 164 LBS | HEART RATE: 80 BPM | OXYGEN SATURATION: 91 %

## 2023-09-27 DIAGNOSIS — I27.20 PULMONARY HYPERTENSION (HCC): ICD-10-CM

## 2023-09-27 DIAGNOSIS — R06.00 DYSPNEA, UNSPECIFIED TYPE: ICD-10-CM

## 2023-09-27 DIAGNOSIS — I95.9 HYPOTENSION, UNSPECIFIED HYPOTENSION TYPE: ICD-10-CM

## 2023-09-27 DIAGNOSIS — I27.20 PULMONARY HYPERTENSION (HCC): Primary | ICD-10-CM

## 2023-09-27 LAB
ALBUMIN SERPL-MCNC: 3.6 G/DL (ref 3.2–4.6)
ALBUMIN/GLOB SERPL: 0.9 (ref 0.4–1.6)
ALP SERPL-CCNC: 76 U/L (ref 50–136)
ALT SERPL-CCNC: 12 U/L (ref 12–65)
ANION GAP SERPL CALC-SCNC: 6 MMOL/L (ref 2–11)
AST SERPL-CCNC: 15 U/L (ref 15–37)
BILIRUB SERPL-MCNC: 0.2 MG/DL (ref 0.2–1.1)
BUN SERPL-MCNC: 18 MG/DL (ref 8–23)
CALCIUM SERPL-MCNC: 9.4 MG/DL (ref 8.3–10.4)
CHLORIDE SERPL-SCNC: 96 MMOL/L (ref 101–110)
CO2 SERPL-SCNC: 42 MMOL/L (ref 21–32)
CORTIS AM PEAK SERPL-MCNC: 15.7 UG/DL (ref 7–25)
CREAT SERPL-MCNC: 0.8 MG/DL (ref 0.6–1)
GLOBULIN SER CALC-MCNC: 3.9 G/DL (ref 2.8–4.5)
GLUCOSE SERPL-MCNC: 142 MG/DL (ref 65–100)
NT PRO BNP: 57 PG/ML
POTASSIUM SERPL-SCNC: 3.7 MMOL/L (ref 3.5–5.1)
PROT SERPL-MCNC: 7.5 G/DL (ref 6.3–8.2)
SODIUM SERPL-SCNC: 144 MMOL/L (ref 133–143)

## 2023-09-27 PROCEDURE — G8417 CALC BMI ABV UP PARAM F/U: HCPCS | Performed by: INTERNAL MEDICINE

## 2023-09-27 PROCEDURE — G8399 PT W/DXA RESULTS DOCUMENT: HCPCS | Performed by: INTERNAL MEDICINE

## 2023-09-27 PROCEDURE — 99215 OFFICE O/P EST HI 40 MIN: CPT | Performed by: INTERNAL MEDICINE

## 2023-09-27 PROCEDURE — G8427 DOCREV CUR MEDS BY ELIG CLIN: HCPCS | Performed by: INTERNAL MEDICINE

## 2023-09-27 PROCEDURE — 1123F ACP DISCUSS/DSCN MKR DOCD: CPT | Performed by: INTERNAL MEDICINE

## 2023-09-27 PROCEDURE — 1090F PRES/ABSN URINE INCON ASSESS: CPT | Performed by: INTERNAL MEDICINE

## 2023-09-27 PROCEDURE — 1036F TOBACCO NON-USER: CPT | Performed by: INTERNAL MEDICINE

## 2023-09-27 ASSESSMENT — ENCOUNTER SYMPTOMS
APNEA: 0
WHEEZING: 1
CONSTIPATION: 0
ABDOMINAL PAIN: 0
COUGH: 1
VOMITING: 0
NAUSEA: 0
SHORTNESS OF BREATH: 1
DIARRHEA: 0
CHEST TIGHTNESS: 0

## 2023-09-27 NOTE — PATIENT INSTRUCTIONS
Plan:  Get lab work drawn today so we can see your kidney function, your potassium and your sodium are okay. Weigh and check your BP every day and Park Ramirez will call you for the next week. Keep all the other medicines the same and increase the torsemide to 40mg daily.

## 2023-10-04 ENCOUNTER — TELEPHONE (OUTPATIENT)
Dept: PULMONOLOGY | Age: 78
End: 2023-10-04

## 2023-10-06 ENCOUNTER — TRANSCRIBE ORDERS (OUTPATIENT)
Dept: SCHEDULING | Age: 78
End: 2023-10-06

## 2023-10-06 ENCOUNTER — TELEPHONE (OUTPATIENT)
Dept: PULMONOLOGY | Age: 78
End: 2023-10-06

## 2023-10-06 DIAGNOSIS — Z12.31 SCREENING MAMMOGRAM FOR HIGH-RISK PATIENT: Primary | ICD-10-CM

## 2023-10-06 RX ORDER — TORSEMIDE 10 MG/1
20 TABLET ORAL DAILY
Qty: 60 TABLET | Refills: 3 | Status: SHIPPED | OUTPATIENT
Start: 2023-10-06

## 2023-10-06 NOTE — TELEPHONE ENCOUNTER
Direct review with Dr Radha Fox who would like patient to maintain weight at 163 lbs and continue torsemide at 20mg daily. Reviewed with patient who verbalizes understanding. Needs torsemide 20mg daily routed to pharmacy.

## 2023-10-06 NOTE — TELEPHONE ENCOUNTER
[facility] Pulmonary Hypertension Telephone Triage    10/6/2023    Main Concern:  Calling to report weights and BP for a follow up plan. 10/1: 106/62; 165.6 lbs  10/2: 109/63; 161.4 lbs  10/3: 100/59; 160.9 lbs  10/4: 104/58; 162.7 lbs  10/5: 117/70; 162.7 lbs  10/6: 125/67; 165.8 lbs    Reports swelling in abd only. States her breathing is \"fine\" on 4LPM. Out of torsemide. Dry weight: ~152 lbs    ROS:  swelling and weight gain    Medications for Pulmonary Hypertension were reconciled and the patient is currently taking:    Drug   dose Normal dose range Started recently? AdCirca (tadalafil) 20mg daily 20mg or 40mg po daily no   Revatio (sildenafil)  10-30mg po tid    Letairis (ambrisentan)  5mg -10mg po daily    Opsumit (macitentan)  10mg po daily    Adempas (riociguat)  1mg-3mg po tid    Tyvaso (inhaled treprostinil) 64mcg QID 18mg (1 puff)-9 puffs 4x/day no   Uptravi (selexipag)  200mcg -1600mcg bid          Lasix (furosemide)      Demadex (torsemide) 40mg daily  Increased from 20/40mg on 10/1   Bumex (bumetadine)      Aldactone (spironolactone)  12.5mg-50mg daily    Inspra (eplerenone)  25mg-50mg daily      Lab Results   Component Value Date/Time     09/27/2023 08:32 AM    K 3.7 09/27/2023 08:32 AM    CL 96 09/27/2023 08:32 AM    CO2 42 09/27/2023 08:32 AM    BUN 18 09/27/2023 08:32 AM    CREATININE 0.80 09/27/2023 08:32 AM    GLUCOSE 142 09/27/2023 08:32 AM    CALCIUM 9.4 09/27/2023 08:32 AM      MD is covering ICU today. Sent message to call to discuss this patient.     Nadeem Thomas RN

## 2023-10-06 NOTE — TELEPHONE ENCOUNTER
Alla Kehr 7 minutes ago (11:35 AM)     WT  Patient says that she has been doing the regiment that her and Dr. Meng Deluna had  discussed . She says that her weight continues to go up and she feels that she is retaining fluid .  She was told by her to report this back to her and that she would get back to her

## 2023-10-06 NOTE — TELEPHONE ENCOUNTER
Patient says that she has been doing the regiment that her and Dr. Terri Holguin had  discussed . She says that her weight continues to go up and she feels that she is retaining fluid .  She was told by her to report this back to her and that she would get back to her

## 2023-10-13 ENCOUNTER — TELEPHONE (OUTPATIENT)
Dept: PULMONOLOGY | Age: 78
End: 2023-10-13

## 2023-10-13 NOTE — TELEPHONE ENCOUNTER
Patient says that she needs a new prescription for this med     torsemide [facility])      She said that the dose had changed at her last visit with Dr. Dylan Castro . Nicolasa Plants She says  that it is suppose to be 20mg tablets 1 time aday . She is almost out .  She and Dr. Dylan Castro discussed the dosage

## 2023-10-16 NOTE — TELEPHONE ENCOUNTER
Spoke to patient and she said the rx that was sent in for her torsemide 10mg has been d/c'd and needs new one sent in to [facility]. Spoke to Julianne Moore at [facility] and will get ready and patient notified.

## 2023-11-02 ENCOUNTER — TELEPHONE (OUTPATIENT)
Dept: PULMONOLOGY | Age: 78
End: 2023-11-02

## 2023-11-02 DIAGNOSIS — J02.9 SORE THROAT: Primary | ICD-10-CM

## 2023-11-02 NOTE — TELEPHONE ENCOUNTER
TRIAGE CALL      Complaint: lymph nodes are swollen   Cough: makes herself cough  Productive:  yes green   Bloody Sputum:  no  Increased SOB/Wheezing:  no  Duration: this mornong   Fever/Chills: no  OTC Meds tried: no       Says she needs some antibiotics

## 2023-11-03 NOTE — TELEPHONE ENCOUNTER
Contacted patient regarding report of swollen lymph nodes. She is only coughing when she forces it after an albuterol tx. No fever, edema & tenderness at lymph nodes on L neck chin area, no problem making saliva, yesterday morning at waking some pain w/ swallowing, but cleared w/ gargling; sputum is green, this is her biggest concern. Asking for antibiotic. Allergy to sulfa & hasn't had good results from [facility]. Confirmed pharmacy on file.

## 2023-11-03 NOTE — TELEPHONE ENCOUNTER
Please get her a rapid strep test and document any fevers, hoarseness or voice changes.       Irma Mccarty MD

## 2023-11-03 NOTE — TELEPHONE ENCOUNTER
Advised patient of MD recommendations. Contacted [facility] near her home to see if can perform test. Not without a visit. Advised patient. She will seek out a [facility], have test done and fax results to our office (in triage dept).

## 2023-11-16 ASSESSMENT — ENCOUNTER SYMPTOMS
WHEEZING: 1
VOMITING: 0
COUGH: 1
SHORTNESS OF BREATH: 1
DIARRHEA: 0
ABDOMINAL PAIN: 0
CONSTIPATION: 0
CHEST TIGHTNESS: 0
NAUSEA: 0
APNEA: 0

## 2023-11-16 NOTE — PROGRESS NOTES
Dr. Kwan Novoa MD  [address] Dorothey Eisenmenger. [address] [facility], [address] [place] [address]  [phone]    Patient Name:  Bhavya Huizar [facility]  YOB: 1945  Office Visit: 11/17/2023    ASSESSMENT AND PLAN:  (Medical Decision Making)    1. Pulmonary hypertension (HCC)  Group 1,3,5 due to idiopathic cause, pulmonary fibrosis, sarcoidosis. Her PVR is >8WU and last echo suggested RVSP is 58. She has significantly benefited from tyvaso and tadalafil with improvement in functional class and walk distance. She uses 4 L/min supplemental oxygen and benefits from this. She remains on torsemide which helps with her leg swelling. Sotatercept may be an option in the future if approved. 2. Sarcoidosis of lung ([address])  Severe with significant fibronodular change within the lungs. She has had significant restriction with moderate restriction since 2019 at least.  This has not worsened. 3. Chronic respiratory failure with hypoxia (HCC)  Continue 4 L/min of supplemental oxygen at rest, with exertion, with sleep from which she is clearly benefiting. 4. Vaccine counseling  Discussed the need for updated pneumococcal vaccination and RSV vaccination. She already had her flu and COVID booster    5. HAO  She has a CPAP machine she is not using. She finds the mask hard to put on and off. She has not really tried it. She agrees to do so. No orders of the defined types were placed in this encounter. No orders of the defined types were placed in this encounter. Follow-up and Dispositions    Return in about 3 months (around 2/17/2024) for follow up in 3-4 months with Hyun Caro or April GOMEZ. Anthony Cobian MD    Total time for encounter on day of encounter was 40 minutes. This time includes chart prep, review of tests/procedures, review of other provider's notes, documentation and counseling patient regarding disease process and medications.

## 2023-11-17 ENCOUNTER — NURSE ONLY (OUTPATIENT)
Dept: PULMONOLOGY | Age: 78
End: 2023-11-17

## 2023-11-17 ENCOUNTER — OFFICE VISIT (OUTPATIENT)
Dept: PULMONOLOGY | Age: 78
End: 2023-11-17
Payer: MEDICARE

## 2023-11-17 VITALS
OXYGEN SATURATION: 95 % | HEIGHT: 63 IN | HEART RATE: 66 BPM | WEIGHT: 161 LBS | TEMPERATURE: 98.2 F | BODY MASS INDEX: 28.53 KG/M2 | RESPIRATION RATE: 20 BRPM | DIASTOLIC BLOOD PRESSURE: 80 MMHG | SYSTOLIC BLOOD PRESSURE: 152 MMHG

## 2023-11-17 DIAGNOSIS — J96.11 CHRONIC RESPIRATORY FAILURE WITH HYPOXIA (HCC): ICD-10-CM

## 2023-11-17 DIAGNOSIS — D86.0 SARCOIDOSIS OF LUNG (HCC): ICD-10-CM

## 2023-11-17 DIAGNOSIS — R06.00 DYSPNEA, UNSPECIFIED TYPE: Primary | ICD-10-CM

## 2023-11-17 DIAGNOSIS — Z71.85 VACCINE COUNSELING: ICD-10-CM

## 2023-11-17 DIAGNOSIS — G47.33 OSA (OBSTRUCTIVE SLEEP APNEA): ICD-10-CM

## 2023-11-17 DIAGNOSIS — I27.20 PULMONARY HYPERTENSION (HCC): Primary | ICD-10-CM

## 2023-11-17 PROCEDURE — G8417 CALC BMI ABV UP PARAM F/U: HCPCS | Performed by: INTERNAL MEDICINE

## 2023-11-17 PROCEDURE — 1123F ACP DISCUSS/DSCN MKR DOCD: CPT | Performed by: INTERNAL MEDICINE

## 2023-11-17 PROCEDURE — G8427 DOCREV CUR MEDS BY ELIG CLIN: HCPCS | Performed by: INTERNAL MEDICINE

## 2023-11-17 PROCEDURE — 1090F PRES/ABSN URINE INCON ASSESS: CPT | Performed by: INTERNAL MEDICINE

## 2023-11-17 PROCEDURE — G8484 FLU IMMUNIZE NO ADMIN: HCPCS | Performed by: INTERNAL MEDICINE

## 2023-11-17 PROCEDURE — 1036F TOBACCO NON-USER: CPT | Performed by: INTERNAL MEDICINE

## 2023-11-17 PROCEDURE — G8399 PT W/DXA RESULTS DOCUMENT: HCPCS | Performed by: INTERNAL MEDICINE

## 2023-11-17 PROCEDURE — 99215 OFFICE O/P EST HI 40 MIN: CPT | Performed by: INTERNAL MEDICINE

## 2023-11-17 NOTE — PATIENT INSTRUCTIONS
Vaccinations are really important this fall going into holidays. Prevnar-20 is the new pneumonia vaccine that you need. The last one we have documented is 2015.   The RSV vaccine is a new vaccine that is recommended for you    I'll message Melinda Adkins about your knee to see if he thinks you need an orthopedist.

## 2023-11-17 NOTE — PROGRESS NOTES
[address] [facility] and [address], [address] [place] [address]  T: [phone]  F: [phone]       6 MINUTE WALK TEST     Patient's Name Ernestina Ingram   Age 66 y.o. Gender female   Height 63in   Weight 161lb lbs   Ordering Provider  Megan Lozada MD          Time Dyspnea  (Aj Scale)  Fatigue  (Aj Scale)  Blood Pressure Heart Rate Oxygen SAT RA or   w / O2? BASELINE 1318  2 3 152/80 66 95 5L Pulse Dose                                                                              POST TEST  1324 5 5 175/93 87 90 5L Pulse Dose     Does the patient use any walking aids? NO. Medications taken before test are up to date:  No  Supplemental oxygen during the test: OTHER: 5L Pulse Dose    Stopped or paused before 6 minutes? No  Other symptoms at end of exercise: Leg Pain    Number of complete laps: 3 x 60 meters = 180 meters + final partial lap: n/a meters = 180 meters    Total distance walked in 6 minutes:  180  Meters  Predicted distance: 387 meters    Percent of predicted distance: 47 %                Tech comments: Pt was able to finished the 6MWT without stopping. Pt's left leg had slight paing around the knee.      Test Performed by: Avelina Vega RCP

## 2023-12-04 ENCOUNTER — HOSPITAL ENCOUNTER (OUTPATIENT)
Dept: MAMMOGRAPHY | Age: 78
Discharge: HOME | End: 2023-12-07
Payer: MEDICARE

## 2023-12-04 DIAGNOSIS — Z12.31 SCREENING MAMMOGRAM FOR HIGH-RISK PATIENT: ICD-10-CM

## 2023-12-04 PROCEDURE — 77067 SCR MAMMO BI INCL CAD: CPT

## 2023-12-20 ENCOUNTER — TELEPHONE (OUTPATIENT)
Dept: PULMONOLOGY | Age: 78
End: 2023-12-20

## 2023-12-20 NOTE — TELEPHONE ENCOUNTER
PA for Tyvaso sent to [facility] via [employer]. Key: [id number]    Your request was denied  This drug is not on your preferred drug list (formulary). The formulary exception is approved for/through 12-31-24 by [facility]&apos;s Non-Formulary Exceptions Pharmacy Coverage Policy. However, there is a concern that may prevent your drug from being filled, or your reimbursement from being paid. The Medicare rule in the Prescription Drug Manual (Chapter 14, Appendix E) says drugs covered under a Pharmaceutical Assistance Program (PAP) cannot be covered under a Part D plan. [facility] follows Medicare rules. The information we have says you are receiving PAP coverage for this drug and per Medicare rules isnt covered. Please contact customer service via the number on the back of your card to resolve.  Authorization Expiration Date: 12/30/2024    Appeal sent via [employer].

## 2023-12-27 ENCOUNTER — TELEPHONE (OUTPATIENT)
Dept: PULMONOLOGY | Age: 78
End: 2023-12-27

## 2023-12-27 NOTE — TELEPHONE ENCOUNTER
PA for Opsumit sent to [facility] via CoverMyMeds. Duong: Daphne Conklin    This request has been approved.    PA Case: [id number], Status: Approved, Coverage Starts on: 1/1/2023 12:00:00 AM, Coverage Ends on: 12/31/2024 12:00:00 AM.

## 2024-01-02 NOTE — TELEPHONE ENCOUNTER
Patients pharmacy requesting refills on patients torsemide (DEMADEX) 10 MG tablets. Last seen by Dr. Booth on 11/17/23. ANASTASIA

## 2024-01-02 NOTE — TELEPHONE ENCOUNTER
Patients pharmacy requesting a refill on patients Tadalafil, PAH, 20 MG tablets. Last seen by Dr. Booth on 11/17/23. ANASTASIA

## 2024-01-04 RX ORDER — TADALAFIL 20 MG/1
20 TABLET ORAL DAILY
Qty: 30 TABLET | Refills: 11 | Status: SHIPPED | OUTPATIENT
Start: 2024-01-04

## 2024-01-04 RX ORDER — TORSEMIDE 10 MG/1
10 TABLET ORAL DAILY
Qty: 90 TABLET | Refills: 3 | Status: SHIPPED | OUTPATIENT
Start: 2024-01-04

## 2024-02-28 ENCOUNTER — OFFICE VISIT (OUTPATIENT)
Dept: PULMONOLOGY | Age: 79
End: 2024-02-28
Payer: MEDICARE

## 2024-02-28 VITALS
OXYGEN SATURATION: 91 % | HEIGHT: 63 IN | BODY MASS INDEX: 28.4 KG/M2 | WEIGHT: 160.3 LBS | TEMPERATURE: 96 F | DIASTOLIC BLOOD PRESSURE: 60 MMHG | SYSTOLIC BLOOD PRESSURE: 140 MMHG

## 2024-02-28 DIAGNOSIS — J96.11 CHRONIC RESPIRATORY FAILURE WITH HYPOXIA (HCC): ICD-10-CM

## 2024-02-28 DIAGNOSIS — G47.33 OSA (OBSTRUCTIVE SLEEP APNEA): ICD-10-CM

## 2024-02-28 DIAGNOSIS — M25.561 ACUTE PAIN OF RIGHT KNEE: ICD-10-CM

## 2024-02-28 DIAGNOSIS — I27.20 PULMONARY HYPERTENSION (HCC): Primary | ICD-10-CM

## 2024-02-28 DIAGNOSIS — D86.0 SARCOIDOSIS OF LUNG (HCC): ICD-10-CM

## 2024-02-28 PROCEDURE — 1123F ACP DISCUSS/DSCN MKR DOCD: CPT | Performed by: STUDENT IN AN ORGANIZED HEALTH CARE EDUCATION/TRAINING PROGRAM

## 2024-02-28 PROCEDURE — G8417 CALC BMI ABV UP PARAM F/U: HCPCS | Performed by: STUDENT IN AN ORGANIZED HEALTH CARE EDUCATION/TRAINING PROGRAM

## 2024-02-28 PROCEDURE — 1090F PRES/ABSN URINE INCON ASSESS: CPT | Performed by: STUDENT IN AN ORGANIZED HEALTH CARE EDUCATION/TRAINING PROGRAM

## 2024-02-28 PROCEDURE — 1036F TOBACCO NON-USER: CPT | Performed by: STUDENT IN AN ORGANIZED HEALTH CARE EDUCATION/TRAINING PROGRAM

## 2024-02-28 PROCEDURE — G8399 PT W/DXA RESULTS DOCUMENT: HCPCS | Performed by: STUDENT IN AN ORGANIZED HEALTH CARE EDUCATION/TRAINING PROGRAM

## 2024-02-28 PROCEDURE — G8427 DOCREV CUR MEDS BY ELIG CLIN: HCPCS | Performed by: STUDENT IN AN ORGANIZED HEALTH CARE EDUCATION/TRAINING PROGRAM

## 2024-02-28 PROCEDURE — 99215 OFFICE O/P EST HI 40 MIN: CPT | Performed by: STUDENT IN AN ORGANIZED HEALTH CARE EDUCATION/TRAINING PROGRAM

## 2024-02-28 PROCEDURE — G8484 FLU IMMUNIZE NO ADMIN: HCPCS | Performed by: STUDENT IN AN ORGANIZED HEALTH CARE EDUCATION/TRAINING PROGRAM

## 2024-02-28 NOTE — PATIENT INSTRUCTIONS
Your pulmonary hypertension team here at [facility] are Sheila Booth MD, April Pollack DNP, AGACNP, UGO Plaza, RN and Stella AWAN CMA. We will work together to care for you. You can reach us at [phone] or through [employer] messaging.     [facility] phone number is [phone]. I have sent in a referral for them to evaluate your right knee pain that is hindering your ability to walk.     Continue your Tyvaso 4 times a day, tadalafil 20 mg daily, and torsemide 10 mg daily. Keep track of your weight daily.

## 2024-02-28 NOTE — PROGRESS NOTES
Sarcoidosis     Sarcoidosis of lung (HCC)      Allergies   Allergen Reactions    Sulfa Antibiotics Itching     Current Outpatient Medications   Medication Instructions    albuterol (PROVENTIL) 2.5 mg, Nebulization, EVERY 6 HOURS PRN    brimonidine-timolol (COMBIGAN) 0.2-0.5 % ophthalmic solution 1 drop, EVERY 12 HOURS    Calcium Carbonate-Vitamin D (CALCIUM-VITAMIN D) 600-125 MG-UNIT TABS Oral    Cholecalciferol 2,000 Units, Oral, DAILY    glipiZIDE (GLUCOTROL XL) 2.5 mg, Oral, DAILY    macitentan (OPSUMIT) 10 MG TABS Oral, DAILY    metFORMIN (GLUCOPHAGE-XR) 500 MG extended release tablet TAKE 1 TABLET BY MOUTH ONCE DAILY    sodium chloride, Inhalant, 3 % nebulizer solution 4 mLs, Nebulization, PRN    Tadalafil (PAH) 20 mg, Oral, DAILY    torsemide (DEMADEX) 10 mg, Oral, DAILY    Treprostinil 64 mcg, Inhalation, 4 times daily

## 2024-05-31 DIAGNOSIS — J84.9 INTERSTITIAL LUNG DISEASE (HCC): ICD-10-CM

## 2024-05-31 DIAGNOSIS — D86.0 PULMONARY SARCOIDOSIS (HCC): Primary | ICD-10-CM

## 2024-05-31 RX ORDER — ALBUTEROL SULFATE 2.5 MG/3ML
2.5 SOLUTION RESPIRATORY (INHALATION) EVERY 6 HOURS PRN
Qty: 360 EACH | Refills: 11 | Status: SHIPPED | OUTPATIENT
Start: 2024-05-31

## 2024-05-31 NOTE — TELEPHONE ENCOUNTER
Patient needs the prescription           albuterol (PROVENTIL) (2.5 MG/3ML) 0.083% nebulizer solution [250]  albuterol (PROVENTIL) (2.5 MG/3ML) 0.083% nebulizer solution [      Sent to   [facility] delivery

## 2024-08-20 ENCOUNTER — HOSPITAL ENCOUNTER (INPATIENT)
Age: 79
LOS: 4 days | Discharge: HOME HEALTH | End: 2024-08-24
Admitting: STUDENT IN AN ORGANIZED HEALTH CARE EDUCATION/TRAINING PROGRAM
Payer: MEDICARE

## 2024-08-20 ENCOUNTER — APPOINTMENT (OUTPATIENT)
Dept: GENERAL RADIOLOGY | Age: 79
End: 2024-08-20
Payer: MEDICARE

## 2024-08-20 DIAGNOSIS — I27.20 PULMONARY HYPERTENSION (HCC): ICD-10-CM

## 2024-08-20 DIAGNOSIS — R06.00 DYSPNEA, UNSPECIFIED TYPE: ICD-10-CM

## 2024-08-20 DIAGNOSIS — R06.02 SHORTNESS OF BREATH: Primary | ICD-10-CM

## 2024-08-20 PROBLEM — J96.21 ACUTE ON CHRONIC RESPIRATORY FAILURE WITH HYPOXEMIA (HCC): Status: ACTIVE | Noted: 2024-08-20

## 2024-08-20 LAB
ALBUMIN SERPL-MCNC: 3.5 G/DL (ref 3.2–4.6)
ALBUMIN/GLOB SERPL: 0.8 (ref 1–1.9)
ALP SERPL-CCNC: 83 U/L (ref 35–104)
ALT SERPL-CCNC: 8 U/L (ref 12–65)
ANION GAP SERPL CALC-SCNC: 4 MMOL/L (ref 9–18)
AST SERPL-CCNC: 19 U/L (ref 15–37)
B PERT DNA SPEC QL NAA+PROBE: NOT DETECTED
BASOPHILS # BLD: 0 K/UL (ref 0–0.2)
BASOPHILS NFR BLD: 1 % (ref 0–2)
BILIRUB SERPL-MCNC: 0.2 MG/DL (ref 0–1.2)
BORDETELLA PARAPERTUSSIS BY PCR: NOT DETECTED
BUN SERPL-MCNC: 13 MG/DL (ref 8–23)
C PNEUM DNA SPEC QL NAA+PROBE: NOT DETECTED
CALCIUM SERPL-MCNC: 10.1 MG/DL (ref 8.8–10.2)
CHLORIDE SERPL-SCNC: 94 MMOL/L (ref 98–107)
CO2 SERPL-SCNC: 44 MMOL/L (ref 20–28)
CREAT SERPL-MCNC: 0.63 MG/DL (ref 0.6–1.1)
DIFFERENTIAL METHOD BLD: ABNORMAL
EKG ATRIAL RATE: 61 BPM
EKG DIAGNOSIS: NORMAL
EKG P AXIS: 57 DEGREES
EKG P-R INTERVAL: 132 MS
EKG Q-T INTERVAL: 400 MS
EKG QRS DURATION: 84 MS
EKG QTC CALCULATION (BAZETT): 402 MS
EKG R AXIS: -16 DEGREES
EKG T AXIS: 64 DEGREES
EKG VENTRICULAR RATE: 61 BPM
EOSINOPHIL # BLD: 0.2 K/UL (ref 0–0.8)
EOSINOPHIL NFR BLD: 3 % (ref 0.5–7.8)
ERYTHROCYTE [DISTWIDTH] IN BLOOD BY AUTOMATED COUNT: 13.2 % (ref 11.9–14.6)
FLUAV SUBTYP SPEC NAA+PROBE: NOT DETECTED
FLUBV RNA SPEC QL NAA+PROBE: NOT DETECTED
GLOBULIN SER CALC-MCNC: 4.5 G/DL (ref 2.3–3.5)
GLUCOSE BLD STRIP.AUTO-MCNC: 237 MG/DL (ref 65–100)
GLUCOSE BLD STRIP.AUTO-MCNC: 93 MG/DL (ref 65–100)
GLUCOSE SERPL-MCNC: 254 MG/DL (ref 70–99)
HADV DNA SPEC QL NAA+PROBE: NOT DETECTED
HCOV 229E RNA SPEC QL NAA+PROBE: NOT DETECTED
HCOV HKU1 RNA SPEC QL NAA+PROBE: NOT DETECTED
HCOV NL63 RNA SPEC QL NAA+PROBE: NOT DETECTED
HCOV OC43 RNA SPEC QL NAA+PROBE: NOT DETECTED
HCT VFR BLD AUTO: 35.6 % (ref 35.8–46.3)
HGB BLD-MCNC: 10 G/DL (ref 11.7–15.4)
HMPV RNA SPEC QL NAA+PROBE: NOT DETECTED
HPIV1 RNA SPEC QL NAA+PROBE: NOT DETECTED
HPIV2 RNA SPEC QL NAA+PROBE: NOT DETECTED
HPIV3 RNA SPEC QL NAA+PROBE: NOT DETECTED
HPIV4 RNA SPEC QL NAA+PROBE: NOT DETECTED
IMM GRANULOCYTES # BLD AUTO: 0 K/UL (ref 0–0.5)
IMM GRANULOCYTES NFR BLD AUTO: 0 % (ref 0–5)
LYMPHOCYTES # BLD: 2.3 K/UL (ref 0.5–4.6)
LYMPHOCYTES NFR BLD: 38 % (ref 13–44)
M PNEUMO DNA SPEC QL NAA+PROBE: NOT DETECTED
MAGNESIUM SERPL-MCNC: 2.1 MG/DL (ref 1.8–2.4)
MCH RBC QN AUTO: 26.9 PG (ref 26.1–32.9)
MCHC RBC AUTO-ENTMCNC: 28.1 G/DL (ref 31.4–35)
MCV RBC AUTO: 95.7 FL (ref 82–102)
MONOCYTES # BLD: 0.5 K/UL (ref 0.1–1.3)
MONOCYTES NFR BLD: 8 % (ref 4–12)
NEUTS SEG # BLD: 3.1 K/UL (ref 1.7–8.2)
NEUTS SEG NFR BLD: 50 % (ref 43–78)
NRBC # BLD: 0 K/UL (ref 0–0.2)
NT PRO BNP: 116 PG/ML (ref 0–450)
PLATELET # BLD AUTO: 195 K/UL (ref 150–450)
PMV BLD AUTO: 9.7 FL (ref 9.4–12.3)
POTASSIUM SERPL-SCNC: 4 MMOL/L (ref 3.5–5.1)
PROCALCITONIN SERPL-MCNC: 0.04 NG/ML (ref 0–0.1)
PROT SERPL-MCNC: 8 G/DL (ref 6.3–8.2)
RBC # BLD AUTO: 3.72 M/UL (ref 4.05–5.2)
RSV RNA SPEC QL NAA+PROBE: NOT DETECTED
RV+EV RNA SPEC QL NAA+PROBE: NOT DETECTED
SARS-COV-2 RNA RESP QL NAA+PROBE: NOT DETECTED
SERVICE CMNT-IMP: ABNORMAL
SERVICE CMNT-IMP: NORMAL
SODIUM SERPL-SCNC: 142 MMOL/L (ref 136–145)
WBC # BLD AUTO: 6.1 K/UL (ref 4.3–11.1)

## 2024-08-20 PROCEDURE — 85025 COMPLETE CBC W/AUTO DIFF WBC: CPT

## 2024-08-20 PROCEDURE — 1100000000 HC RM PRIVATE

## 2024-08-20 PROCEDURE — 2700000000 HC OXYGEN THERAPY PER DAY

## 2024-08-20 PROCEDURE — 6370000000 HC RX 637 (ALT 250 FOR IP): Performed by: STUDENT IN AN ORGANIZED HEALTH CARE EDUCATION/TRAINING PROGRAM

## 2024-08-20 PROCEDURE — 82962 GLUCOSE BLOOD TEST: CPT

## 2024-08-20 PROCEDURE — 84145 PROCALCITONIN (PCT): CPT

## 2024-08-20 PROCEDURE — 93005 ELECTROCARDIOGRAM TRACING: CPT

## 2024-08-20 PROCEDURE — 83880 ASSAY OF NATRIURETIC PEPTIDE: CPT

## 2024-08-20 PROCEDURE — 80053 COMPREHEN METABOLIC PANEL: CPT

## 2024-08-20 PROCEDURE — 94760 N-INVAS EAR/PLS OXIMETRY 1: CPT

## 2024-08-20 PROCEDURE — 94640 AIRWAY INHALATION TREATMENT: CPT

## 2024-08-20 PROCEDURE — 93010 ELECTROCARDIOGRAM REPORT: CPT | Performed by: INTERNAL MEDICINE

## 2024-08-20 PROCEDURE — 6360000002 HC RX W HCPCS: Performed by: STUDENT IN AN ORGANIZED HEALTH CARE EDUCATION/TRAINING PROGRAM

## 2024-08-20 PROCEDURE — 83735 ASSAY OF MAGNESIUM: CPT

## 2024-08-20 PROCEDURE — 99285 EMERGENCY DEPT VISIT HI MDM: CPT

## 2024-08-20 PROCEDURE — 71046 X-RAY EXAM CHEST 2 VIEWS: CPT

## 2024-08-20 PROCEDURE — 0202U NFCT DS 22 TRGT SARS-COV-2: CPT

## 2024-08-20 RX ORDER — INSULIN LISPRO 100 [IU]/ML
0-4 INJECTION, SOLUTION INTRAVENOUS; SUBCUTANEOUS
Status: DISCONTINUED | OUTPATIENT
Start: 2024-08-20 | End: 2024-08-24 | Stop reason: HOSPADM

## 2024-08-20 RX ORDER — IBUPROFEN 600 MG/1
1 TABLET ORAL PRN
Status: DISCONTINUED | OUTPATIENT
Start: 2024-08-20 | End: 2024-08-24 | Stop reason: HOSPADM

## 2024-08-20 RX ORDER — ALBUTEROL SULFATE 0.83 MG/ML
2.5 SOLUTION RESPIRATORY (INHALATION) EVERY 6 HOURS PRN
Status: DISCONTINUED | OUTPATIENT
Start: 2024-08-20 | End: 2024-08-24 | Stop reason: HOSPADM

## 2024-08-20 RX ORDER — SILDENAFIL CITRATE 20 MG/1
20 TABLET ORAL 3 TIMES DAILY
Status: DISCONTINUED | OUTPATIENT
Start: 2024-08-20 | End: 2024-08-20 | Stop reason: CLARIF

## 2024-08-20 RX ORDER — INSULIN LISPRO 100 [IU]/ML
0-4 INJECTION, SOLUTION INTRAVENOUS; SUBCUTANEOUS NIGHTLY
Status: DISCONTINUED | OUTPATIENT
Start: 2024-08-20 | End: 2024-08-24 | Stop reason: HOSPADM

## 2024-08-20 RX ORDER — DEXTROSE MONOHYDRATE 100 MG/ML
INJECTION, SOLUTION INTRAVENOUS CONTINUOUS PRN
Status: DISCONTINUED | OUTPATIENT
Start: 2024-08-20 | End: 2024-08-24 | Stop reason: HOSPADM

## 2024-08-20 RX ORDER — ACETAMINOPHEN 325 MG/1
650 TABLET ORAL EVERY 6 HOURS PRN
Status: DISCONTINUED | OUTPATIENT
Start: 2024-08-20 | End: 2024-08-24 | Stop reason: HOSPADM

## 2024-08-20 RX ORDER — TORSEMIDE 20 MG/1
10 TABLET ORAL DAILY
Status: DISCONTINUED | OUTPATIENT
Start: 2024-08-20 | End: 2024-08-24 | Stop reason: HOSPADM

## 2024-08-20 RX ORDER — TADALAFIL 20 MG/1
20 TABLET ORAL DAILY
Status: DISCONTINUED | OUTPATIENT
Start: 2024-08-20 | End: 2024-08-24 | Stop reason: HOSPADM

## 2024-08-20 RX ORDER — TADALAFIL 20 MG/1
20 TABLET ORAL DAILY
Status: DISCONTINUED | OUTPATIENT
Start: 2024-08-20 | End: 2024-08-20 | Stop reason: CLARIF

## 2024-08-20 RX ADMIN — TADALAFIL 20 MG: 20 TABLET ORAL at 18:08

## 2024-08-20 RX ADMIN — ACETAMINOPHEN 650 MG: 325 TABLET ORAL at 21:32

## 2024-08-20 RX ADMIN — ALBUTEROL SULFATE 2.5 MG: 2.5 SOLUTION RESPIRATORY (INHALATION) at 17:54

## 2024-08-20 RX ADMIN — TORSEMIDE 10 MG: 20 TABLET ORAL at 17:54

## 2024-08-20 ASSESSMENT — PAIN SCALES - WONG BAKER: WONGBAKER_NUMERICALRESPONSE: NO HURT

## 2024-08-20 ASSESSMENT — LIFESTYLE VARIABLES
HOW OFTEN DO YOU HAVE A DRINK CONTAINING ALCOHOL: NEVER
HOW MANY STANDARD DRINKS CONTAINING ALCOHOL DO YOU HAVE ON A TYPICAL DAY: PATIENT DOES NOT DRINK

## 2024-08-20 NOTE — PROGRESS NOTES
4 Eyes Skin Assessment     NAME:  Li Mosquera  YOB: 1945  MEDICAL RECORD NUMBER:  [id number]    The patient is being assessed for  Admission    I agree that at least one RN has performed a thorough Head to Toe Skin Assessment on the patient. ALL assessment sites listed below have been assessed.      Areas assessed by both nurses:    Head, Face, Ears, Shoulders, Back, Chest, Arms, Elbows, Hands, Sacrum. Buttock, Coccyx, Ischium, and Legs. Feet and Heels        Does the Patient have a Wound? No noted wound(s)       Abelardo Prevention initiated by RN: Yes  Wound Care Orders initiated by RN: No    Pressure Injury (Stage 3,4, Unstageable, DTI, NWPT, and Complex wounds) if present, place Wound referral order by RN under : No    New Ostomies, if present place, Ostomy referral order under : No     Nurse 1 eSignature: Electronically signed by Margaux Galarza RN on 8/20/24 at 4:57 PM EDT    **SHARE this note so that the co-signing nurse can place an eSignature**    Nurse 2 eSignature: {Esignature:[id number]}

## 2024-08-20 NOTE — ED TRIAGE NOTES
Pt was seen at primary physician today.  Pt reports increased sob and increased difficulty walking due to fatigue with exertion and arthritis in right knee.  Pt has hx of COPD and is on 4 L 02 baseline.

## 2024-08-20 NOTE — ED PROVIDER NOTES
DETECTED NOTDET      Parainfluenza 4 PCR NOT DETECTED NOTDET      Respiratory Syncytial Virus by PCR NOT DETECTED NOTDET      Bordetella parapertussis by PCR NOT DETECTED NOTDET      Bordetella pertussis by PCR NOT DETECTED NOTDET      Chlamydophila Pneumonia PCR NOT DETECTED NOTDET      Mycoplasma pneumo by PCR NOT DETECTED NOTDET     XR CHEST (2 VW)    Narrative    Chest X-ray    INDICATION: SOB    COMPARISON:  None    TECHNIQUE: PA and lateral views of the chest were obtained.    FINDINGS: Chronic interstitial airspace opacities and bronchiectatic changes  with an upper lobe predominance. These findings are chronic bibasilar  subsegmental atelectasis.  The heart size is normal.  The bony thorax is intact.         Impression    Chronic interstitial lung disease.      Electronically signed by Edelmira Ledesma   CBC with Auto Differential   Result Value Ref Range    WBC 6.1 4.3 - 11.1 K/uL    RBC 3.72 (L) 4.05 - 5.2 M/uL    Hemoglobin 10.0 (L) 11.7 - 15.4 g/dL    Hematocrit 35.6 (L) 35.8 - 46.3 %    MCV 95.7 82 - 102 FL    MCH 26.9 26.1 - 32.9 PG    MCHC 28.1 (L) 31.4 - 35.0 g/dL    RDW 13.2 11.9 - 14.6 %    Platelets 195 150 - 450 K/uL    MPV 9.7 9.4 - 12.3 FL    nRBC 0.00 0.0 - 0.2 K/uL    Differential Type AUTOMATED      Neutrophils % 50 43 - 78 %    Lymphocytes % 38 13 - 44 %    Monocytes % 8 4.0 - 12.0 %    Eosinophils % 3 0.5 - 7.8 %    Basophils % 1 0.0 - 2.0 %    Immature Granulocytes % 0 0.0 - 5.0 %    Neutrophils Absolute 3.1 1.7 - 8.2 K/UL    Lymphocytes Absolute 2.3 0.5 - 4.6 K/UL    Monocytes Absolute 0.5 0.1 - 1.3 K/UL    Eosinophils Absolute 0.2 0.0 - 0.8 K/UL    Basophils Absolute 0.0 0.0 - 0.2 K/UL    Immature Granulocytes Absolute 0.0 0.0 - 0.5 K/UL   Comprehensive Metabolic Panel   Result Value Ref Range    Sodium 142 136 - 145 mmol/L    Potassium 4.0 3.5 - 5.1 mmol/L    Chloride 94 (L) 98 - 107 mmol/L    CO2 44 (H) 20 - 28 mmol/L    Anion Gap 4 (L) 9 - 18 mmol/L    Glucose 254 (H) 70 - 99 mg/dL     BUN 13 8 - 23 MG/DL    Creatinine 0.63 0.60 - 1.10 MG/DL    Est, Glom Filt Rate >90 >60 ml/min/1.73m2    Calcium 10.1 8.8 - 10.2 MG/DL    Total Bilirubin 0.2 0.0 - 1.2 MG/DL    ALT 8 (L) 12 - 65 U/L    AST 19 15 - 37 U/L    Alk Phosphatase 83 35 - 104 U/L    Total Protein 8.0 6.3 - 8.2 g/dL    Albumin 3.5 3.2 - 4.6 g/dL    Globulin 4.5 (H) 2.3 - 3.5 g/dL    Albumin/Globulin Ratio 0.8 (L) 1.0 - 1.9     Magnesium   Result Value Ref Range    Magnesium 2.1 1.8 - 2.4 mg/dL   Procalcitonin   Result Value Ref Range    Procalcitonin 0.04 0.00 - 0.10 ng/mL   Brain Natriuretic Peptide   Result Value Ref Range    NT Pro- 0 - 450 PG/ML   POCT Glucose   Result Value Ref Range    POC Glucose 93 65 - 100 mg/dL    Performed by: Hector    POCT Glucose   Result Value Ref Range    POC Glucose 237 (H) 65 - 100 mg/dL    Performed by: Abran    EKG 12 Lead   Result Value Ref Range    Ventricular Rate 61 BPM    Atrial Rate 61 BPM    P-R Interval 132 ms    QRS Duration 84 ms    Q-T Interval 400 ms    QTc Calculation (Bazett) 402 ms    P Axis 57 degrees    R Axis -16 degrees    T Axis 64 degrees    Diagnosis       Sinus rhythm with Premature atrial complexes  Nonspecific T wave abnormality  Abnormal ECG  When compared with ECG of 05-APR-2021 07:22,  Premature atrial complexes are now Present  Non-specific change in ST segment in Lateral leads  T wave inversion no longer evident in Inferior leads  T wave inversion no longer evident in Anterior leads  Confirmed by MD CARTER DANIEL ([id number]) on 8/20/2024 12:46:35 PM           XR CHEST (2 VW)   Final Result   Chronic interstitial lung disease.         Electronically signed by Edelmira Ledesma                   Recent Labs     08/20/24  1228   COVID19 NOT DETECTED        Voice dictation software was used during the making of this note.  This software is not perfect and grammatical and other typographical errors may be present.  This note has not been completely proofread for  errors.     Kofi Rivera, DO  08/20/24 2204

## 2024-08-20 NOTE — PROGRESS NOTES
TRANSFER - IN REPORT:    Verbal report received on Li Mosquera  being received from ER for routine progression of patient care      Report consisted of patient’s Situation, Background, Assessment and   Recommendations(SBAR).     Information from the following report(s) ED Encounter Summary was reviewed with the receiving nurse.    Opportunity for questions and clarification was provided.

## 2024-08-20 NOTE — H&P
Hospitalist History and Physical   Admit Date:  2024 11:35 AM   Name:  Li Mosquera   Age:  78 y.o.  Sex:  female  :  1945   MRN:  [id number]   Room:  [facility]    Presenting/Chief Complaint: Shortness of Breath     Reason(s) for Admission: Pulmonary hypertension (HCC) [I27.20]     History of Present Illness:   Li Mosquera is a 78 y.o. female with medical history of pulmonary hypertension, COPD, sarcoidosis of lung, chronic hypoxic respiratory failure with 4L O2, and HAO, who presented with dyspnea.    On the day of admission, the patient developed worsening of SOB.  No increased sputum production or coughing.  No fever or chills.  No nausea/vomiting.  The patient had an appointment with pulmonology Dr. Booth this morning.  She did require more oxygen than baseline in the office.  Dr. Booth recommended the patient to come to the ER for further evaluation.    In ER, the patient required 4 to 5 L of nasal cannula, satting at more than 90%.  CBC and CMP relatively unremarkable.  Pro BNP within normal limits.  CXR showed chronic interstitial lung disease.  Hospitalist was consulted for admission regarding dyspnea of unknown etiology.    The patient was seen while she was in ER.  Other than shortness of breath, she did not have any complaints.  She did state that she has pulmonary hypertension being treated with medication.  She denies any smoking.  Occasional alcohol.  No recreational drug use.      Assessment & Plan:     Acute on chronic hypoxic respiratory failure  -baseline 4L NC  -no evidence of CHF or COPD exac  -suspecting worsening of pulmonary hypertension; came from Pulmonology office for further evaluation  -will order echocardiogram  -consult Pulmonology  -keep O2 >90%      Pulmonary hypertension  Interstitial lung disease  Sarcoidosis of lung  -continue tadalafil, treprostinil, torsemide  -consulting Plumonology as mentioned above      COPD  -no evidence of exacerbation  -continue  12:46:35 PM     CBC with Auto Differential    Collection Time: 08/20/24 12:28 PM   Result Value Ref Range    WBC 6.1 4.3 - 11.1 K/uL    RBC 3.72 (L) 4.05 - 5.2 M/uL    Hemoglobin 10.0 (L) 11.7 - 15.4 g/dL    Hematocrit 35.6 (L) 35.8 - 46.3 %    MCV 95.7 82 - 102 FL    MCH 26.9 26.1 - 32.9 PG    MCHC 28.1 (L) 31.4 - 35.0 g/dL    RDW 13.2 11.9 - 14.6 %    Platelets 195 150 - 450 K/uL    MPV 9.7 9.4 - 12.3 FL    nRBC 0.00 0.0 - 0.2 K/uL    Differential Type AUTOMATED      Neutrophils % 50 43 - 78 %    Lymphocytes % 38 13 - 44 %    Monocytes % 8 4.0 - 12.0 %    Eosinophils % 3 0.5 - 7.8 %    Basophils % 1 0.0 - 2.0 %    Immature Granulocytes % 0 0.0 - 5.0 %    Neutrophils Absolute 3.1 1.7 - 8.2 K/UL    Lymphocytes Absolute 2.3 0.5 - 4.6 K/UL    Monocytes Absolute 0.5 0.1 - 1.3 K/UL    Eosinophils Absolute 0.2 0.0 - 0.8 K/UL    Basophils Absolute 0.0 0.0 - 0.2 K/UL    Immature Granulocytes Absolute 0.0 0.0 - 0.5 K/UL   Comprehensive Metabolic Panel    Collection Time: 08/20/24 12:28 PM   Result Value Ref Range    Sodium 142 136 - 145 mmol/L    Potassium 4.0 3.5 - 5.1 mmol/L    Chloride 94 (L) 98 - 107 mmol/L    CO2 44 (H) 20 - 28 mmol/L    Anion Gap 4 (L) 9 - 18 mmol/L    Glucose 254 (H) 70 - 99 mg/dL    BUN 13 8 - 23 MG/DL    Creatinine 0.63 0.60 - 1.10 MG/DL    Est, Glom Filt Rate >90 >60 ml/min/1.73m2    Calcium 10.1 8.8 - 10.2 MG/DL    Total Bilirubin 0.2 0.0 - 1.2 MG/DL    ALT 8 (L) 12 - 65 U/L    AST 19 15 - 37 U/L    Alk Phosphatase 83 35 - 104 U/L    Total Protein 8.0 6.3 - 8.2 g/dL    Albumin 3.5 3.2 - 4.6 g/dL    Globulin 4.5 (H) 2.3 - 3.5 g/dL    Albumin/Globulin Ratio 0.8 (L) 1.0 - 1.9     Magnesium    Collection Time: 08/20/24 12:28 PM   Result Value Ref Range    Magnesium 2.1 1.8 - 2.4 mg/dL   Respiratory Panel, Molecular, with COVID-19 (Restricted: peds pts or suitable admitted adults)    Collection Time: 08/20/24 12:28 PM    Specimen: Nasopharyngeal   Result Value Ref Range    Adenovirus by PCR NOT

## 2024-08-20 NOTE — ED NOTES
TRANSFER - OUT REPORT:    Verbal report given to Sophia LR on Li Mosquera  being transferred to 8th floor for routine progression of patient care       Report consisted of patient's Situation, Background, Assessment and   Recommendations(SBAR).     Information from the following report(s) ED SBAR was reviewed with the receiving nurse.    Georgina Fall Assessment:    Presents to emergency department  because of falls (Syncope, seizure, or loss of consciousness): No  Age > 70: Yes  Altered Mental Status, Intoxication with alcohol or substance confusion (Disorientation, impaired judgment, poor safety awaremess, or inability to follow instructions): No  Impaired Mobility: Ambulates or transfers with assistive devices or assistance; Unable to ambulate or transer.: Yes  Nursing Judgement: Yes          Lines:   Peripheral IV 08/20/24 Right Antecubital (Active)        Opportunity for questions and clarification was provided.      Patient transported with:  O2 @ 4lpm and Gabriela Vincent RN  08/20/24 8865

## 2024-08-21 ENCOUNTER — APPOINTMENT (OUTPATIENT)
Dept: NON INVASIVE DIAGNOSTICS | Age: 79
End: 2024-08-21
Attending: STUDENT IN AN ORGANIZED HEALTH CARE EDUCATION/TRAINING PROGRAM
Payer: MEDICARE

## 2024-08-21 ENCOUNTER — APPOINTMENT (OUTPATIENT)
Dept: CT IMAGING | Age: 79
End: 2024-08-21
Payer: MEDICARE

## 2024-08-21 LAB
ALBUMIN SERPL-MCNC: 3 G/DL (ref 3.2–4.6)
ALBUMIN/GLOB SERPL: 0.7 (ref 1–1.9)
ALP SERPL-CCNC: 69 U/L (ref 35–104)
ALT SERPL-CCNC: <5 U/L (ref 12–65)
ANION GAP SERPL CALC-SCNC: 6 MMOL/L (ref 9–18)
AST SERPL-CCNC: 20 U/L (ref 15–37)
BASOPHILS # BLD: 0 K/UL (ref 0–0.2)
BASOPHILS NFR BLD: 1 % (ref 0–2)
BILIRUB SERPL-MCNC: <0.2 MG/DL (ref 0–1.2)
BUN SERPL-MCNC: 12 MG/DL (ref 8–23)
CALCIUM SERPL-MCNC: 9.4 MG/DL (ref 8.8–10.2)
CHLORIDE SERPL-SCNC: 96 MMOL/L (ref 98–107)
CO2 SERPL-SCNC: 42 MMOL/L (ref 20–28)
CREAT SERPL-MCNC: 0.57 MG/DL (ref 0.6–1.1)
DIFFERENTIAL METHOD BLD: ABNORMAL
ECHO AO ASC DIAM: 3.1 CM
ECHO AO ASCENDING AORTA INDEX: 1.77 CM/M2
ECHO AO ROOT DIAM: 2.7 CM
ECHO AO ROOT INDEX: 1.54 CM/M2
ECHO AV AREA PEAK VELOCITY: 2.1 CM2
ECHO AV AREA VTI: 2.6 CM2
ECHO AV AREA/BSA PEAK VELOCITY: 1.2 CM2/M2
ECHO AV AREA/BSA VTI: 1.5 CM2/M2
ECHO AV MEAN GRADIENT: 8 MMHG
ECHO AV MEAN VELOCITY: 1.3 M/S
ECHO AV PEAK GRADIENT: 17 MMHG
ECHO AV PEAK VELOCITY: 2.1 M/S
ECHO AV VELOCITY RATIO: 0.62
ECHO AV VTI: 40.7 CM
ECHO BSA: 1.79 M2
ECHO EST RA PRESSURE: 3 MMHG
ECHO IVC PROX: 1 CM
ECHO LA AREA 2C: 18.8 CM2
ECHO LA AREA 4C: 21.3 CM2
ECHO LA DIAMETER INDEX: 1.77 CM/M2
ECHO LA DIAMETER: 3.1 CM
ECHO LA MAJOR AXIS: 6 CM
ECHO LA MINOR AXIS: 5.6 CM
ECHO LA TO AORTIC ROOT RATIO: 1.15
ECHO LA VOL BP: 58 ML (ref 22–52)
ECHO LA VOL MOD A2C: 52 ML (ref 22–52)
ECHO LA VOL MOD A4C: 61 ML (ref 22–52)
ECHO LA VOL/BSA BIPLANE: 33 ML/M2 (ref 16–34)
ECHO LA VOLUME INDEX MOD A2C: 30 ML/M2 (ref 16–34)
ECHO LA VOLUME INDEX MOD A4C: 35 ML/M2 (ref 16–34)
ECHO LV E' LATERAL VELOCITY: 10 CM/S
ECHO LV E' SEPTAL VELOCITY: 8 CM/S
ECHO LV EDV A2C: 73 ML
ECHO LV EDV A4C: 86 ML
ECHO LV EDV INDEX A4C: 49 ML/M2
ECHO LV EDV NDEX A2C: 42 ML/M2
ECHO LV EJECTION FRACTION A2C: 59 %
ECHO LV EJECTION FRACTION A4C: 62 %
ECHO LV EJECTION FRACTION BIPLANE: 60 % (ref 55–100)
ECHO LV ESV A2C: 30 ML
ECHO LV ESV A4C: 33 ML
ECHO LV ESV INDEX A2C: 17 ML/M2
ECHO LV ESV INDEX A4C: 19 ML/M2
ECHO LV FRACTIONAL SHORTENING: 36 % (ref 28–44)
ECHO LV INTERNAL DIMENSION DIASTOLE INDEX: 2.4 CM/M2
ECHO LV INTERNAL DIMENSION DIASTOLIC: 4.2 CM (ref 3.9–5.3)
ECHO LV INTERNAL DIMENSION SYSTOLIC INDEX: 1.54 CM/M2
ECHO LV INTERNAL DIMENSION SYSTOLIC: 2.7 CM
ECHO LV IVSD: 1.1 CM (ref 0.6–0.9)
ECHO LV MASS 2D: 147 G (ref 67–162)
ECHO LV MASS INDEX 2D: 84 G/M2 (ref 43–95)
ECHO LV POSTERIOR WALL DIASTOLIC: 1 CM (ref 0.6–0.9)
ECHO LV RELATIVE WALL THICKNESS RATIO: 0.48
ECHO LVOT AREA: 3.5 CM2
ECHO LVOT AV VTI INDEX: 0.76
ECHO LVOT DIAM: 2.1 CM
ECHO LVOT MEAN GRADIENT: 3 MMHG
ECHO LVOT MEAN GRADIENT: 3 MMHG
ECHO LVOT PEAK GRADIENT: 6 MMHG
ECHO LVOT PEAK VELOCITY: 1.3 M/S
ECHO LVOT STROKE VOLUME INDEX: 60.9 ML/M2
ECHO LVOT SV: 106.6 ML
ECHO LVOT VTI: 30.8 CM
ECHO MV A VELOCITY: 1.37 M/S
ECHO MV AREA VTI: 3.2 CM2
ECHO MV E DECELERATION TIME (DT): 298 MS
ECHO MV E VELOCITY: 0.99 M/S
ECHO MV E/A RATIO: 0.72
ECHO MV E/E' LATERAL: 9.9
ECHO MV E/E' RATIO (AVERAGED): 11.14
ECHO MV E/E' SEPTAL: 12.38
ECHO MV LVOT VTI INDEX: 1.08
ECHO MV MAX VELOCITY: 1.4 M/S
ECHO MV MEAN GRADIENT: 3 MMHG
ECHO MV MEAN VELOCITY: 0.8 M/S
ECHO MV PEAK GRADIENT: 8 MMHG
ECHO MV VTI: 33.2 CM
ECHO PV ACCELERATION TIME (AT): 77 MS
ECHO PV MAX VELOCITY: 1.3 M/S
ECHO PV PEAK GRADIENT: 7 MMHG
ECHO RIGHT VENTRICULAR SYSTOLIC PRESSURE (RVSP): 69 MMHG
ECHO RV BASAL DIMENSION: 3.4 CM
ECHO RV FREE WALL PEAK S': 9 CM/S
ECHO RV INTERNAL DIMENSION: 2.1 CM
ECHO RV TAPSE: 1.9 CM (ref 1.7–?)
ECHO TV REGURGITANT MAX VELOCITY: 4.05 M/S
ECHO TV REGURGITANT PEAK GRADIENT: 66 MMHG
EOSINOPHIL # BLD: 0.3 K/UL (ref 0–0.8)
EOSINOPHIL NFR BLD: 4 % (ref 0.5–7.8)
ERYTHROCYTE [DISTWIDTH] IN BLOOD BY AUTOMATED COUNT: 13.1 % (ref 11.9–14.6)
EST. AVERAGE GLUCOSE BLD GHB EST-MCNC: 174 MG/DL
GLOBULIN SER CALC-MCNC: 4.2 G/DL (ref 2.3–3.5)
GLUCOSE BLD STRIP.AUTO-MCNC: 114 MG/DL (ref 65–100)
GLUCOSE BLD STRIP.AUTO-MCNC: 131 MG/DL (ref 65–100)
GLUCOSE BLD STRIP.AUTO-MCNC: 230 MG/DL (ref 65–100)
GLUCOSE BLD STRIP.AUTO-MCNC: 287 MG/DL (ref 65–100)
GLUCOSE SERPL-MCNC: 118 MG/DL (ref 70–99)
HBA1C MFR BLD: 7.7 % (ref 0–5.6)
HCT VFR BLD AUTO: 30.9 % (ref 35.8–46.3)
HGB BLD-MCNC: 8.8 G/DL (ref 11.7–15.4)
IMM GRANULOCYTES # BLD AUTO: 0 K/UL (ref 0–0.5)
IMM GRANULOCYTES NFR BLD AUTO: 0 % (ref 0–5)
LYMPHOCYTES # BLD: 2.5 K/UL (ref 0.5–4.6)
LYMPHOCYTES NFR BLD: 41 % (ref 13–44)
MCH RBC QN AUTO: 26.6 PG (ref 26.1–32.9)
MCHC RBC AUTO-ENTMCNC: 28.5 G/DL (ref 31.4–35)
MCV RBC AUTO: 93.4 FL (ref 82–102)
MONOCYTES # BLD: 0.5 K/UL (ref 0.1–1.3)
MONOCYTES NFR BLD: 9 % (ref 4–12)
NEUTS SEG # BLD: 2.8 K/UL (ref 1.7–8.2)
NEUTS SEG NFR BLD: 46 % (ref 43–78)
NRBC # BLD: 0 K/UL (ref 0–0.2)
PLATELET # BLD AUTO: 218 K/UL (ref 150–450)
PMV BLD AUTO: 10.8 FL (ref 9.4–12.3)
POTASSIUM SERPL-SCNC: 3.8 MMOL/L (ref 3.5–5.1)
PROT SERPL-MCNC: 7.2 G/DL (ref 6.3–8.2)
RBC # BLD AUTO: 3.31 M/UL (ref 4.05–5.2)
SERVICE CMNT-IMP: ABNORMAL
SODIUM SERPL-SCNC: 144 MMOL/L (ref 136–145)
WBC # BLD AUTO: 6.2 K/UL (ref 4.3–11.1)

## 2024-08-21 PROCEDURE — 6360000004 HC RX CONTRAST MEDICATION: Performed by: INTERNAL MEDICINE

## 2024-08-21 PROCEDURE — 87205 SMEAR GRAM STAIN: CPT

## 2024-08-21 PROCEDURE — 85025 COMPLETE CBC W/AUTO DIFF WBC: CPT

## 2024-08-21 PROCEDURE — 6360000002 HC RX W HCPCS: Performed by: INTERNAL MEDICINE

## 2024-08-21 PROCEDURE — 94640 AIRWAY INHALATION TREATMENT: CPT

## 2024-08-21 PROCEDURE — 6370000000 HC RX 637 (ALT 250 FOR IP): Performed by: STUDENT IN AN ORGANIZED HEALTH CARE EDUCATION/TRAINING PROGRAM

## 2024-08-21 PROCEDURE — 36415 COLL VENOUS BLD VENIPUNCTURE: CPT

## 2024-08-21 PROCEDURE — 71260 CT THORAX DX C+: CPT

## 2024-08-21 PROCEDURE — 93306 TTE W/DOPPLER COMPLETE: CPT | Performed by: INTERNAL MEDICINE

## 2024-08-21 PROCEDURE — 97161 PT EVAL LOW COMPLEX 20 MIN: CPT

## 2024-08-21 PROCEDURE — 1100000000 HC RM PRIVATE

## 2024-08-21 PROCEDURE — 5A09357 ASSISTANCE WITH RESPIRATORY VENTILATION, LESS THAN 24 CONSECUTIVE HOURS, CONTINUOUS POSITIVE AIRWAY PRESSURE: ICD-10-PCS

## 2024-08-21 PROCEDURE — 2580000003 HC RX 258: Performed by: INTERNAL MEDICINE

## 2024-08-21 PROCEDURE — 93306 TTE W/DOPPLER COMPLETE: CPT

## 2024-08-21 PROCEDURE — 94660 CPAP INITIATION&MGMT: CPT

## 2024-08-21 PROCEDURE — 82962 GLUCOSE BLOOD TEST: CPT

## 2024-08-21 PROCEDURE — 97530 THERAPEUTIC ACTIVITIES: CPT

## 2024-08-21 PROCEDURE — 83036 HEMOGLOBIN GLYCOSYLATED A1C: CPT

## 2024-08-21 PROCEDURE — 87070 CULTURE OTHR SPECIMN AEROBIC: CPT

## 2024-08-21 PROCEDURE — 80053 COMPREHEN METABOLIC PANEL: CPT

## 2024-08-21 PROCEDURE — 99223 1ST HOSP IP/OBS HIGH 75: CPT | Performed by: INTERNAL MEDICINE

## 2024-08-21 RX ORDER — SODIUM CHLORIDE FOR INHALATION 3 %
4 VIAL, NEBULIZER (ML) INHALATION 2 TIMES DAILY
Status: DISCONTINUED | OUTPATIENT
Start: 2024-08-21 | End: 2024-08-24 | Stop reason: HOSPADM

## 2024-08-21 RX ORDER — ALBUTEROL SULFATE 0.83 MG/ML
2.5 SOLUTION RESPIRATORY (INHALATION) 2 TIMES DAILY
Status: DISCONTINUED | OUTPATIENT
Start: 2024-08-21 | End: 2024-08-24 | Stop reason: HOSPADM

## 2024-08-21 RX ORDER — IOPAMIDOL 755 MG/ML
75 INJECTION, SOLUTION INTRAVASCULAR
Status: COMPLETED | OUTPATIENT
Start: 2024-08-21 | End: 2024-08-21

## 2024-08-21 RX ADMIN — IOPAMIDOL 75 ML: 755 INJECTION, SOLUTION INTRAVENOUS at 11:44

## 2024-08-21 RX ADMIN — TORSEMIDE 10 MG: 20 TABLET ORAL at 09:50

## 2024-08-21 RX ADMIN — ALBUTEROL SULFATE 2.5 MG: 2.5 SOLUTION RESPIRATORY (INHALATION) at 19:41

## 2024-08-21 RX ADMIN — ALBUTEROL SULFATE 2.5 MG: 2.5 SOLUTION RESPIRATORY (INHALATION) at 11:33

## 2024-08-21 RX ADMIN — INSULIN LISPRO 1 UNITS: 100 INJECTION, SOLUTION INTRAVENOUS; SUBCUTANEOUS at 12:38

## 2024-08-21 RX ADMIN — TADALAFIL 20 MG: 20 TABLET ORAL at 09:51

## 2024-08-21 RX ADMIN — SODIUM CHLORIDE SOLN NEBU 3% 4 ML: 3 NEBU SOLN at 11:33

## 2024-08-21 ASSESSMENT — PAIN SCALES - GENERAL
PAINLEVEL_OUTOF10: 0
PAINLEVEL_OUTOF10: 0

## 2024-08-21 NOTE — CARE COORDINATION
08/21/24 1326   Service Assessment   Patient Orientation Alert and Oriented   Cognition Alert   History Provided By Patient   Primary Caregiver Self   Accompanied By/Relationship no one at bedside   Support Systems Spouse/Significant Other   Patient's Healthcare Decision Maker is: Legal Next of Kin   PCP Verified by CM Yes   Last Visit to PCP Within last 3 months   Prior Functional Level Independent in ADLs/IADLs   Current Functional Level Independent in ADLs/IADLs   Can patient return to prior living arrangement Yes   Ability to make needs known: Good   Family able to assist with home care needs: Yes   Would you like for me to discuss the discharge plan with any other family members/significant others, and if so, who? Yes   Financial Resources Medicare   Community Resources None   Social/Functional History   Lives With Spouse   Type of Home House   Home Equipment Oxygen   Receives Help From Family   ADL Assistance Independent   Homemaking Assistance Independent   Homemaking Responsibilities No   Ambulation Assistance Independent   Transfer Assistance Independent   Active  No   Occupation Retired   Discharge Planning   Type of Residence House   Living Arrangements Spouse/Significant Other   Current Services Prior To Admission Oxygen Therapy   Potential Assistance Needed N/A   DME Ordered? No   Potential Assistance Purchasing Medications No   Type of Home Care Services None   Patient expects to be discharged to: House     Patient uses home 02 continuous ([facility] or [facility]). No history of HH or rehab. Lives with her spouse. Independent with ADLs.  drives patient to appointments. DME: 02, cpap  CM following.

## 2024-08-21 NOTE — PROGRESS NOTES
ACUTE PHYSICAL THERAPY GOALS:   (Developed with and agreed upon by patient and/or caregiver.)    LTG:  (1.)Ms. Mosquera will move from supine to sit and sit to supine , scoot up and down, and roll side to side in bed with INDEPENDENT within 7 treatment day(s).    (2.)Ms. Mosquera will transfer from bed to chair and chair to bed with MODIFIED INDEPENDENCE using the least restrictive device within 7 treatment day(s).    (3.)Ms. Mosquera will ambulate with MODIFIED INDEPENDENCE for 300 feet with the least restrictive device within 7 treatment day(s).  (4.)Ms. Mosquera will tolerate at least 23 min of dynamic standing activity to assist standing ADLs with the least restrictive device within 7 treatment days.      ________________________________________________________________________________________________      PHYSICAL THERAPY Initial Assessment, Daily Note, and AM  (Link to Caseload Tracking: PT Visit Days : 1  Acknowledge Orders  Time In/Out  PT Charge Capture  Rehab Caseload Tracker    Li Mosquera is a 78 y.o. female   PRIMARY DIAGNOSIS: Acute on chronic respiratory failure with hypoxemia (HCC)  Shortness of breath [R06.02]  Pulmonary hypertension (HCC) [I27.20]       Reason for Referral: Generalized Muscle Weakness (M62.81)  Other lack of cordination (R27.8)  Difficulty in walking, Not elsewhere classified (R26.2)  Other abnormalities of gait and mobility (R26.89)  Inpatient: Payor: [employer]ARE / Plan: [employer] G[employer]US HMO / Product Type: *No Product type* /     ASSESSMENT:     REHAB RECOMMENDATIONS:   Recommendation to date pending progress:  Setting:  Outpatient Therapy  For balance and R knee pain    Equipment:    Rolling Walker     ASSESSMENT:  Ms. Mosquera presents in supine, agreeable to session. She presents on 5L, but requires 8L and frequent standing rest breaks to maintain oxygen saturation.       Upon entering, Pnt is agreeable to PT treatment.  she reports 3/0 chronic pain in in her R knee at rest. Pnt  address:)  Decreased ADL/Functional Activities  Decreased Activity Tolerance  Decreased AROM/PROM  Decreased Balance  Decreased Coordination  Decreased Gait Ability  Decreased Strength  Decreased Transfer Abilities INTERVENTIONS PLANNED:   (Benefits and precautions of physical therapy have been discussed with the patient.)  Self Care Training  Therapeutic Activity  Therapeutic Exercise/HEP  Neuromuscular Re-education  Gait Training  Manual Therapy  Modalities  Education       TREATMENT:   EVALUATION: LOW COMPLEXITY: (Untimed Charge)  The initial evaluation charge encompasses clinical chart review, objective assessment, interpretation of assessment, and skilled monitoring of the patient's response to treatment in order to develop a plan of care.     TREATMENT:   Therapeutic Activity (26 Minutes): Therapeutic activity included Rolling, Supine to Sit, Scooting, Transfer Training, Ambulation on level ground, Sitting balance , and Standing balance to improve functional Activity tolerance, Balance, Coordination, Mobility, Strength, and ROM.    TREATMENT GRID:  N/A    AFTER TREATMENT PRECAUTIONS: Call light within reach, Chair, Needs within reach, and RN notified    INTERDISCIPLINARY COLLABORATION:  RN/ PCT and PT/ PTA    EDUCATION: Education Given To: Patient;Family  Education Provided: Role of Therapy;Plan of Care    TIME IN/OUT:  Time In: 0920  Time Out: 0951  Minutes: 31    Kaia Sanchez, PT

## 2024-08-21 NOTE — CONSULTS
History and Physical Initial Visit NOTE           8/21/2024    Li Mosquera                        Date of Admission:  8/20/2024    The patient's chart is reviewed and the patient is discussed with the staff.    HPI:     Patient is a 78 y.o.  female seen and evaluated at the request of Dr. Keshawn Metz for shortness of breath and hypertension.     Patient was seen 8/20 by Dr. Booth in the office.  She was noted to desaturate into the 70s with 6-minute walk test on baseline oxygen requirement with exertion.  She was sent to the ED for further evaluation and was subsequently admitted for shortness of breath, pulmonary hypertension, and increased dyspnea. Plan for further workup w/ echo to evaluate right side heart pressures.   She says she has noticed weakness, increased dyspnea, more \"gravity,\" and \"everything going down\" when she stands for the past 2 weeks.  She endorses decreased energy for the past several months.  She has a chronic cough with yellow/green sputum.  She denies dizziness, chest pain, change in cough, change in medication use, hematuria, hematochezia, melena, hematemesis, or other complaints or concerns.  She says she uses her Tyvaso at least 3 times a day, but usually 4 times a day.  She says she is compliant with her medications.  Says she does not wear because she cannot tolerate the mask.    PMH significant for pulmonary sarcoidosis, HAO (non-compliant w/ CPAP), interstitial lung disease, pulmonary hypertension, type 2 diabetes, hypersomnolence, hypercholesterolemia, bronchiectasis without complication, chronic respiratory failure with hypoxia, pulmonary emphysema, sarcoid uveitis both eyes, and GERD.    Subjective:   No acute distress. Getting echocardiogram at time of exam. SpO2 100% on 5 L/min via nasal cannula.     Review of Systems: Comprehensive ROS negative except in HPI    Current Outpatient Medications   Medication Instructions    albuterol (PROVENTIL)

## 2024-08-21 NOTE — PROGRESS NOTES
Hospitalist Progress Note   Admit Date:  2024 11:35 AM   Name:  Li Mosquera   Age:  78 y.o.  Sex:  female  :  1945   MRN:  [id number]   Room:  [facility]/    Presenting/Chief Complaint: Shortness of Breath     Reason(s) for Admission: Shortness of breath [R06.02]  Pulmonary hypertension (HCC) [I27.20]     Hospital Course:   Li Mosquera is a 78 y.o. female with medical history of hypertension, COPD, sarcoidosis of lung, chronic hypoxic respiratory failure with 4L O2, and HAO admitted with acute on chronic respiratory failure.  Patient was referred by pulmonology clinic due to noted desaturating into the 70s on baseline oxygen.  Pulmonology consulted.    Subjective & 24hr Events:   Patient seen and examined at the bedside.  Reports feeling much better today.  Denies nausea, vomiting, chest pain or palpitation.  Denies of breath has been improved.  On 5 L nasal cannula and saturating 96%.    Pulmonology has evaluated patient.  CTAP ordered to rule out PE.     Assessment & Plan:     Acute on chronic hypoxic respiratory failure  Pulmonary hypertension  Interstitial lung disease  Sarcoidosis of lung  COPD  -baseline 4L NC, patient was referred by pulmonology clinic due to noted desaturating into the 70s on baseline oxygen.  Currently on 5 L nasal cannula.  -Pulmonology consulted, appreciate recommendation  -Follow-up on echocardiogram  -CTAP to rule out PE  -Nebs treatment as needed  -keep O2 >90%  -continue tadalafil, treprostinil, torsemide    Type 2 DM  -A1c 7.7  -sliding scale  -Will not order glipizide to prevent hypoglycemia.  Will not order metformin for now, either, for possible contrast studies     HAO  -not compliant with CPAP, willing to try CPAP tonight  -continue supplemental O2 at night       Anticipated Discharge Arrangements:   Therapy has not evaluated yet    PT/OT evals ordered?  Therapy evals ordered  Diet:  ADULT DIET; Regular; 4 carb choices (60 gm/meal)  VTE prophylaxis:  Glucose 174 mg/dL   POCT Glucose    Collection Time: 08/21/24  7:47 AM   Result Value Ref Range    POC Glucose 131 (H) 65 - 100 mg/dL    Performed by: SimmsGomesAngeliquePCT    POCT Glucose    Collection Time: 08/21/24 11:06 AM   Result Value Ref Range    POC Glucose 230 (H) 65 - 100 mg/dL    Performed by: SimmsGomesAngeliquePCT        Recent Labs     08/20/24  1228   COVID19 NOT DETECTED       Current Meds:  Current Facility-Administered Medications   Medication Dose Route Frequency    albuterol (PROVENTIL) (2.5 MG/3ML) 0.083% nebulizer solution 2.5 mg  2.5 mg Nebulization BID    sodium chloride (Inhalant) 3 % nebulizer solution 4 mL  4 mL Nebulization BID    iopamidol (ISOVUE-370) 76 % injection 75 mL  75 mL IntraVENous ONCE PRN    albuterol (PROVENTIL) (2.5 MG/3ML) 0.083% nebulizer solution 2.5 mg  2.5 mg Nebulization Q6H PRN    torsemide (DEMADEX) tablet 10 mg  10 mg Oral Daily    Treprostinil POWD 64 mcg (Patient Supplied)  64 mcg Inhalation 4x Daily RT    acetaminophen (TYLENOL) tablet 650 mg  650 mg Oral Q6H PRN    insulin lispro (HUMALOG,ADMELOG) injection vial 0-4 Units  0-4 Units SubCUTAneous TID WC    insulin lispro (HUMALOG,ADMELOG) injection vial 0-4 Units  0-4 Units SubCUTAneous Nightly    glucose chewable tablet 16 g  4 tablet Oral PRN    dextrose bolus 10% 125 mL  125 mL IntraVENous PRN    Or    dextrose bolus 10% 250 mL  250 mL IntraVENous PRN    Glucagon Emergency KIT 1 mg  1 mg SubCUTAneous PRN    dextrose 10 % infusion   IntraVENous Continuous PRN    Tadalafil (PAH) tablet 20 mg (Patient Supplied)  20 mg Oral Daily       Signed:  CARL MAYS MD    Part of this note may have been written by using a voice dictation software.  The note has been proof read but may still contain some grammatical/other typographical errors.

## 2024-08-22 PROBLEM — J96.21 ACUTE ON CHRONIC RESPIRATORY FAILURE WITH HYPOXEMIA (HCC): Status: RESOLVED | Noted: 2024-08-20 | Resolved: 2024-08-22

## 2024-08-22 LAB
ALBUMIN SERPL-MCNC: 3.1 G/DL (ref 3.2–4.6)
ALBUMIN/GLOB SERPL: 0.7 (ref 1–1.9)
ALP SERPL-CCNC: 70 U/L (ref 35–104)
ALT SERPL-CCNC: <5 U/L (ref 12–65)
ANION GAP SERPL CALC-SCNC: 6 MMOL/L (ref 9–18)
AST SERPL-CCNC: 19 U/L (ref 15–37)
BACTERIA SPEC CULT: NORMAL
BACTERIA SPEC CULT: NORMAL
BASOPHILS # BLD: 0.1 K/UL (ref 0–0.2)
BASOPHILS NFR BLD: 1 % (ref 0–2)
BILIRUB SERPL-MCNC: 0.2 MG/DL (ref 0–1.2)
BUN SERPL-MCNC: 13 MG/DL (ref 8–23)
CALCIUM SERPL-MCNC: 9.3 MG/DL (ref 8.8–10.2)
CHLORIDE SERPL-SCNC: 94 MMOL/L (ref 98–107)
CO2 SERPL-SCNC: 42 MMOL/L (ref 20–28)
CREAT SERPL-MCNC: 0.58 MG/DL (ref 0.6–1.1)
DIFFERENTIAL METHOD BLD: ABNORMAL
EOSINOPHIL # BLD: 0.3 K/UL (ref 0–0.8)
EOSINOPHIL NFR BLD: 4 % (ref 0.5–7.8)
ERYTHROCYTE [DISTWIDTH] IN BLOOD BY AUTOMATED COUNT: 13.3 % (ref 11.9–14.6)
GLOBULIN SER CALC-MCNC: 4.3 G/DL (ref 2.3–3.5)
GLUCOSE BLD STRIP.AUTO-MCNC: 149 MG/DL (ref 65–100)
GLUCOSE BLD STRIP.AUTO-MCNC: 172 MG/DL (ref 65–100)
GLUCOSE BLD STRIP.AUTO-MCNC: 235 MG/DL (ref 65–100)
GLUCOSE BLD STRIP.AUTO-MCNC: 340 MG/DL (ref 65–100)
GLUCOSE SERPL-MCNC: 117 MG/DL (ref 70–99)
GRAM STN SPEC: NORMAL
HCT VFR BLD AUTO: 30.4 % (ref 35.8–46.3)
HGB BLD-MCNC: 9 G/DL (ref 11.7–15.4)
IMM GRANULOCYTES # BLD AUTO: 0 K/UL (ref 0–0.5)
IMM GRANULOCYTES NFR BLD AUTO: 0 % (ref 0–5)
LYMPHOCYTES # BLD: 3 K/UL (ref 0.5–4.6)
LYMPHOCYTES NFR BLD: 42 % (ref 13–44)
MCH RBC QN AUTO: 27 PG (ref 26.1–32.9)
MCHC RBC AUTO-ENTMCNC: 29.6 G/DL (ref 31.4–35)
MCV RBC AUTO: 91.3 FL (ref 82–102)
MONOCYTES # BLD: 0.6 K/UL (ref 0.1–1.3)
MONOCYTES NFR BLD: 8 % (ref 4–12)
NEUTS SEG # BLD: 3.1 K/UL (ref 1.7–8.2)
NEUTS SEG NFR BLD: 45 % (ref 43–78)
NRBC # BLD: 0 K/UL (ref 0–0.2)
PLATELET # BLD AUTO: 248 K/UL (ref 150–450)
PMV BLD AUTO: 10.7 FL (ref 9.4–12.3)
POTASSIUM SERPL-SCNC: 3.5 MMOL/L (ref 3.5–5.1)
PROT SERPL-MCNC: 7.4 G/DL (ref 6.3–8.2)
RBC # BLD AUTO: 3.33 M/UL (ref 4.05–5.2)
SERVICE CMNT-IMP: ABNORMAL
SERVICE CMNT-IMP: NORMAL
SODIUM SERPL-SCNC: 142 MMOL/L (ref 136–145)
WBC # BLD AUTO: 7 K/UL (ref 4.3–11.1)

## 2024-08-22 PROCEDURE — 94760 N-INVAS EAR/PLS OXIMETRY 1: CPT

## 2024-08-22 PROCEDURE — 2580000003 HC RX 258: Performed by: INTERNAL MEDICINE

## 2024-08-22 PROCEDURE — 6360000002 HC RX W HCPCS: Performed by: INTERNAL MEDICINE

## 2024-08-22 PROCEDURE — 1100000000 HC RM PRIVATE

## 2024-08-22 PROCEDURE — 36415 COLL VENOUS BLD VENIPUNCTURE: CPT

## 2024-08-22 PROCEDURE — 6370000000 HC RX 637 (ALT 250 FOR IP): Performed by: INTERNAL MEDICINE

## 2024-08-22 PROCEDURE — 6370000000 HC RX 637 (ALT 250 FOR IP): Performed by: PHYSICIAN ASSISTANT

## 2024-08-22 PROCEDURE — 99233 SBSQ HOSP IP/OBS HIGH 50: CPT | Performed by: INTERNAL MEDICINE

## 2024-08-22 PROCEDURE — 82164 ANGIOTENSIN I ENZYME TEST: CPT

## 2024-08-22 PROCEDURE — 85025 COMPLETE CBC W/AUTO DIFF WBC: CPT

## 2024-08-22 PROCEDURE — 94660 CPAP INITIATION&MGMT: CPT

## 2024-08-22 PROCEDURE — 82962 GLUCOSE BLOOD TEST: CPT

## 2024-08-22 PROCEDURE — 94640 AIRWAY INHALATION TREATMENT: CPT

## 2024-08-22 PROCEDURE — 2700000000 HC OXYGEN THERAPY PER DAY

## 2024-08-22 PROCEDURE — 80053 COMPREHEN METABOLIC PANEL: CPT

## 2024-08-22 PROCEDURE — 6370000000 HC RX 637 (ALT 250 FOR IP): Performed by: STUDENT IN AN ORGANIZED HEALTH CARE EDUCATION/TRAINING PROGRAM

## 2024-08-22 PROCEDURE — 86140 C-REACTIVE PROTEIN: CPT

## 2024-08-22 RX ORDER — INSULIN GLARGINE 100 [IU]/ML
9 INJECTION, SOLUTION SUBCUTANEOUS NIGHTLY
Status: DISCONTINUED | OUTPATIENT
Start: 2024-08-22 | End: 2024-08-24 | Stop reason: HOSPADM

## 2024-08-22 RX ORDER — PREDNISONE 20 MG/1
20 TABLET ORAL DAILY
Status: DISCONTINUED | OUTPATIENT
Start: 2024-08-22 | End: 2024-08-24 | Stop reason: HOSPADM

## 2024-08-22 RX ORDER — INSULIN GLARGINE 100 [IU]/ML
10 INJECTION, SOLUTION SUBCUTANEOUS NIGHTLY
Status: DISCONTINUED | OUTPATIENT
Start: 2024-08-22 | End: 2024-08-22

## 2024-08-22 RX ADMIN — INSULIN GLARGINE 9 UNITS: 100 INJECTION, SOLUTION SUBCUTANEOUS at 21:05

## 2024-08-22 RX ADMIN — TORSEMIDE 10 MG: 20 TABLET ORAL at 08:18

## 2024-08-22 RX ADMIN — SODIUM CHLORIDE SOLN NEBU 3% 4 ML: 3 NEBU SOLN at 08:09

## 2024-08-22 RX ADMIN — PREDNISONE 20 MG: 20 TABLET ORAL at 11:54

## 2024-08-22 RX ADMIN — TADALAFIL 20 MG: 20 TABLET ORAL at 08:25

## 2024-08-22 RX ADMIN — ALBUTEROL SULFATE 2.5 MG: 2.5 SOLUTION RESPIRATORY (INHALATION) at 08:10

## 2024-08-22 RX ADMIN — INSULIN LISPRO 1 UNITS: 100 INJECTION, SOLUTION INTRAVENOUS; SUBCUTANEOUS at 11:29

## 2024-08-22 RX ADMIN — INSULIN LISPRO 4 UNITS: 100 INJECTION, SOLUTION INTRAVENOUS; SUBCUTANEOUS at 21:04

## 2024-08-22 RX ADMIN — ALBUTEROL SULFATE 2.5 MG: 2.5 SOLUTION RESPIRATORY (INHALATION) at 19:43

## 2024-08-22 RX ADMIN — SODIUM CHLORIDE SOLN NEBU 3% 4 ML: 3 NEBU SOLN at 19:43

## 2024-08-22 ASSESSMENT — PAIN SCALES - GENERAL: PAINLEVEL_OUTOF10: 0

## 2024-08-22 NOTE — PROGRESS NOTES
Pt resting in bed comfortably at this time. Alert and oriented times 4. No distress noted, respirations even and unlabored on 3 L NC. Pt denies pain at this time. Pt instructed to call for assistance if needed, call light in place, will continue to monitor. Will prepare for bedside shift report.

## 2024-08-22 NOTE — DISCHARGE INSTRUCTIONS
[facility]  [address] [place] [address]   [phone]    The pulmonary office will call you in 1-2 business days to arrange follow up in the pulmonary office. If you have not heard from the office after 2 full business days, please call the office to arrange follow up.

## 2024-08-22 NOTE — PROGRESS NOTES
Hospitalist Progress Note   Admit Date:  2024 11:35 AM   Name:  Li Msoquera   Age:  78 y.o.  Sex:  female  :  1945   MRN:  [id number]   Room:  [facility]    Presenting/Chief Complaint: Shortness of Breath     Reason(s) for Admission: Shortness of breath [R06.02]  Pulmonary hypertension (HCC) [I27.20]     Hospital Course:   Li Mosquera is a 78 y.o. female with medical history of HTN, COPD, sarcoidosis of lung, chronic hypoxic respiratory failure on 4L of O2 at baseline, HAO who presented from her pulmonologist office with hypoxia as she was found to have O2 sats in the 70's on her baseline 4L of O2 and she was admitted. Pulmonology was consulted who recommended CT which showed no e/o PE, chronic pulm HTN, ILD with extensive combings and intralobar septal, subcentimeter mediastinal adenopathy. ECHO showing EF of 60 - 65%, Normal wall thickness. Normal wall motion. Normal diastolic function for age. She was to be discharged home on  as her O2 sats were >90% at rest however with ambulation required 8L to keep her sats >90%.    Subjective & 24hr Events:   Seen at bedside today and discussed with pulmonology.      Assessment & Plan:     Principal Problem:    Acute on chronic respiratory failure with hypoxia (HCC) with baseline O2 4L however exertional requirements are 8L    Sarcoidosis of lung (HCC)    Interstitial lung disease (HCC)    Pulmonary hypertension (HCC)  - prednisone added by pulm, appreciate assistance  - continue tadalafil, treprostinil, torsemide, and nebulizers    Active Problems:    HAO on CPAP  - continue CPAP and discuss importance of compliance with patient      Controlled type 2 diabetes mellitus without complication, without long-term current use of insulin (HCC) most recent A1c 7.7  - 10U lantus (0.125mg/kg) added this evening given steroids  - DM team consulted      Anticipated Discharge Arrangements:   Therapy has not evaluated yet    PT/OT evals ordered?  Therapy evals  11.7 - 15.4 g/dL    Hematocrit 30.9 (L) 35.8 - 46.3 %    MCV 93.4 82 - 102 FL    MCH 26.6 26.1 - 32.9 PG    MCHC 28.5 (L) 31.4 - 35.0 g/dL    RDW 13.1 11.9 - 14.6 %    Platelets 218 150 - 450 K/uL    MPV 10.8 9.4 - 12.3 FL    nRBC 0.00 0.0 - 0.2 K/uL    Differential Type AUTOMATED      Neutrophils % 46 43 - 78 %    Lymphocytes % 41 13 - 44 %    Monocytes % 9 4.0 - 12.0 %    Eosinophils % 4 0.5 - 7.8 %    Basophils % 1 0.0 - 2.0 %    Immature Granulocytes % 0 0.0 - 5.0 %    Neutrophils Absolute 2.8 1.7 - 8.2 K/UL    Lymphocytes Absolute 2.5 0.5 - 4.6 K/UL    Monocytes Absolute 0.5 0.1 - 1.3 K/UL    Eosinophils Absolute 0.3 0.0 - 0.8 K/UL    Basophils Absolute 0.0 0.0 - 0.2 K/UL    Immature Granulocytes Absolute 0.0 0.0 - 0.5 K/UL   Comprehensive Metabolic Panel    Collection Time: 08/21/24  3:24 AM   Result Value Ref Range    Sodium 144 136 - 145 mmol/L    Potassium 3.8 3.5 - 5.1 mmol/L    Chloride 96 (L) 98 - 107 mmol/L    CO2 42 (H) 20 - 28 mmol/L    Anion Gap 6 (L) 9 - 18 mmol/L    Glucose 118 (H) 70 - 99 mg/dL    BUN 12 8 - 23 MG/DL    Creatinine 0.57 (L) 0.60 - 1.10 MG/DL    Est, Glom Filt Rate >90 >60 ml/min/1.73m2    Calcium 9.4 8.8 - 10.2 MG/DL    Total Bilirubin <0.2 0.0 - 1.2 MG/DL    ALT <5 (L) 12 - 65 U/L    AST 20 15 - 37 U/L    Alk Phosphatase 69 35 - 104 U/L    Total Protein 7.2 6.3 - 8.2 g/dL    Albumin 3.0 (L) 3.2 - 4.6 g/dL    Globulin 4.2 (H) 2.3 - 3.5 g/dL    Albumin/Globulin Ratio 0.7 (L) 1.0 - 1.9     Hemoglobin A1C    Collection Time: 08/21/24  3:24 AM   Result Value Ref Range    Hemoglobin A1C 7.7 (H) 0 - 5.6 %    Estimated Avg Glucose 174 mg/dL   POCT Glucose    Collection Time: 08/21/24  7:47 AM   Result Value Ref Range    POC Glucose 131 (H) 65 - 100 mg/dL    Performed by: Jeovany    Echo (TTE) complete (PRN contrast/bubble/strain/3D)    Collection Time: 08/21/24  8:34 AM   Result Value Ref Range    LV EDV A2C 73 mL    LV EDV A4C 86 mL    LV ESV A2C 30 mL    LV ESV A4C 33 mL  Supplied)  64 mcg Inhalation 4x Daily RT    acetaminophen (TYLENOL) tablet 650 mg  650 mg Oral Q6H PRN    insulin lispro (HUMALOG,ADMELOG) injection vial 0-4 Units  0-4 Units SubCUTAneous TID WC    insulin lispro (HUMALOG,ADMELOG) injection vial 0-4 Units  0-4 Units SubCUTAneous Nightly    glucose chewable tablet 16 g  4 tablet Oral PRN    dextrose bolus 10% 125 mL  125 mL IntraVENous PRN    Or    dextrose bolus 10% 250 mL  250 mL IntraVENous PRN    Glucagon Emergency KIT 1 mg  1 mg SubCUTAneous PRN    dextrose 10 % infusion   IntraVENous Continuous PRN    Tadalafil (PAH) tablet 20 mg (Patient Supplied)  20 mg Oral Daily       Signed:  SANDY CINTRON

## 2024-08-22 NOTE — PROGRESS NOTES
Oxygen Qualifier       Room air: SpO2 with O2 and liter flow   Resting SpO2  75%  92% on 4L   Ambulating SpO2  75% 88% on 6L,93% on 8L       Completed by:Patient ambulated with the aid of a walker and needed 4L while resting and 8L while ambulating    Sincere Orozco RCP

## 2024-08-22 NOTE — PROGRESS NOTES
Pt resting in bed comfortably at this time, alert and oriented times 4. No distress noted, respirations even and unlabored on 3 L NC. Pt denies pain at this time. Pt instructed to call for assistance if needed, call light in place, will continue to monitor.  at the bedside.

## 2024-08-22 NOTE — PROGRESS NOTES
Li Mosquera  Admission Date: 8/20/2024         Daily Progress Note: 8/22/2024    The patient's chart is reviewed and the patient is discussed with the staff.    Background: 78 y.o.  female seen and evaluated at the request of Dr. Keshawn Metz for shortness of breath and hypertension.      Patient was seen 8/20 by Dr. Booth in the office.  She was noted to desaturate into the 70s with 6-minute walk test on baseline oxygen requirement with exertion.  She was sent to the ED for further evaluation and was subsequently admitted for shortness of breath, pulmonary hypertension, and increased dyspnea. Plan for further workup w/ echo to evaluate right side heart pressures.   She says she has noticed weakness, increased dyspnea, more \"gravity,\" and \"everything going down\" when she stands for the past 2 weeks.  She endorses decreased energy for the past several months.  She has a chronic cough with yellow/green sputum.  She denies dizziness, chest pain, change in cough, change in medication use, hematuria, hematochezia, melena, hematemesis, or other complaints or concerns.  She says she uses her Tyvaso at least 3 times a day, but usually 4 times a day.  She says she is compliant with her medications.  Says she does not wear because she cannot tolerate the mask.     PMH significant for pulmonary sarcoidosis, HAO (non-compliant w/ CPAP), interstitial lung disease, pulmonary hypertension, type 2 diabetes, hypersomnolence, hypercholesterolemia, bronchiectasis without complication, chronic respiratory failure with hypoxia, pulmonary emphysema, sarcoid uveitis both eyes, and GERD.    Subjective:     Wore CPAP last night. CT yesterday. Feels okay. Weaned to 3L now, sats good.     Current Facility-Administered Medications   Medication Dose Route Frequency    albuterol (PROVENTIL) (2.5 MG/3ML) 0.083% nebulizer solution 2.5 mg  2.5 mg Nebulization BID    sodium chloride (Inhalant) 3 % nebulizer solution  10.0* 8.8* 9.0*   HCT 35.6* 30.9* 30.4*    218 248   PROCAL 0.04  --   --      Recent Labs     08/20/24  1228 08/21/24  0324 08/22/24  0323    144 142   K 4.0 3.8 3.5   CL 94* 96* 94*   CO2 44* 42* 42*   BUN 13 12 13   CREATININE 0.63 0.57* 0.58*   MG 2.1  --   --    BILITOT 0.2 <0.2 0.2   AST 19 20 19   ALT 8* <5* <5*   ALKPHOS 83 69 70     No results for input(s): \"TROPHS\", \"NTPROBNP\", \"CRP\", \"ESR\" in the last 72 hours.  Recent Labs     08/20/24  1228 08/21/24  0324 08/22/24  0323   GLUCOSE 254* 118* 117*      Microbiology:   [unfilled]  No results for input(s): \"CULTURE\" in the last 72 hours.  Recent Labs     08/20/24  1228   COVID19 NOT DETECTED     ECHO: 08/20/24    ECHO (TTE) COMPLETE (PRN CONTRAST/BUBBLE/STRAIN/3D) 08/21/2024  3:09 PM (Final)    Interpretation Summary    Left Ventricle: Normal left ventricular systolic function with a visually estimated EF of 60 - 65%. Left ventricle size is normal. Normal wall thickness. Normal wall motion. Normal diastolic function for age. Average E/e' ratio is 11.14.    Tricuspid Valve: Mild regurgitation. Severely elevated RVSP, consistent with severe pulmonary hypertension. The estimated RVSP is 69 mmHg.    Left Atrium: Left atrium is mildly dilated. LA Vol Index is  33 ml/m2.    Pulmonary Arteries: Main pulmonary artery is moderately dilated.    Signed by: Carl Perales MD on 8/21/2024  3:09 PM    Assessment and Plan:  (Medical Decision Making)   Impression: 79 y/o female with  pulmonary sarcoidosis, HAO (non-compliant w/ CPAP), interstitial lung disease, pulmonary hypertension, type 2 diabetes, hypersomnolence, hypercholesterolemia, bronchiectasis without complication, chronic respiratory failure with hypoxia, pulmonary emphysema, sarcoid uveitis both eyes, and GERD. She was in for a routine follow up yesterday and demonstrated increased desaturation and shortness of breath. Had been feeling more fatigued the past couple weeks though no  syncope/presyncope, chest discomfort or swelling. She has had some chronic sputum production usually at night and discolored for yellow sputum, but not really different. She has not noticed more edema or weight gain and BNP remains low. She remains on her tyvaso, Tadalafil and Torsemide, but has not used CPAP in probably 2 years. CT with mildly increased patchy ground glass opacities.     Principal Problem:    Acute on chronic respiratory failure with hypoxemia (HCC)  Plan: weaned to 3L. Baseline 3L baseline and 4L exertion and sleep. Will plan on ambulatory oximetry. I do think there is some mild increase in GGO on CT and no real evidence for volume overload. I suspect perhaps some sarcoidosis inflammation, but she had a similar episode in March, was given steroids and didn't think it really helped. I will have her walk and if seems back to baseline will probably just let her discharge with office follow up. I will get repeat CRP and ACE in case we need to try steroids. If O2 still lower than baseline would add steroids at a lower dose, probably 20mg for 2 weeks then 10mg for 2 weeks and follow up in office in 2-4 weeks.   Active Problems:    HAO on CPAP  Plan: need to restart at home, tolerated here last night    Sarcoidosis of lung (HCC)  Plan: If needs steroids again may need to consider adding Imuran or other steroid sparing agent.     Interstitial lung disease (HCC)  Plan: Fibrotic changes appear stable    Pulmonary hypertension (HCC)  Plan: Group 1,3,5 due to idiopathic cause, pulmonary fibrosis, sarcoidosis. Her PVR is >8WU and last echo suggested RVSP is 58.   --Tyvaso QID  --Tildalafil 20mg daily  --Torsemide  --Albuterol PRN      More than 50% of the time documented was spent in face-to-face contact with the patient and in the care of the patient on the floor/unit where the patient is located.    Desirae Barry MD

## 2024-08-22 NOTE — PROGRESS NOTES
Walked and was 3-4L at rest and 8L with exertion. Will add prednisone 20mg daily and monitor. May need more basal insulin with this, will add Lantus 10 tonight.     Vahid Barry MD

## 2024-08-22 NOTE — PROGRESS NOTES
Pt resting in bed comfortably at this time. Alert and oriented times 4. No distress noted, respirations even and unlabored on CPAP at this time. Pt denies pain at this time. Pt instructed to call for assistance if needed, call light in place, will continue to monitor.

## 2024-08-23 PROBLEM — J96.21 ACUTE ON CHRONIC RESPIRATORY FAILURE WITH HYPOXEMIA (HCC): Status: ACTIVE | Noted: 2019-01-10

## 2024-08-23 LAB
ACE SERPL-CCNC: 53 U/L (ref 14–82)
ALBUMIN SERPL-MCNC: 3.1 G/DL (ref 3.2–4.6)
ALBUMIN/GLOB SERPL: 0.7 (ref 1–1.9)
ALP SERPL-CCNC: 69 U/L (ref 35–104)
ALT SERPL-CCNC: <5 U/L (ref 12–65)
ANION GAP SERPL CALC-SCNC: 6 MMOL/L (ref 9–18)
AST SERPL-CCNC: 18 U/L (ref 15–37)
BASOPHILS # BLD: 0 K/UL (ref 0–0.2)
BASOPHILS NFR BLD: 1 % (ref 0–2)
BILIRUB SERPL-MCNC: 0.2 MG/DL (ref 0–1.2)
BUN SERPL-MCNC: 16 MG/DL (ref 8–23)
CALCIUM SERPL-MCNC: 9.6 MG/DL (ref 8.8–10.2)
CHLORIDE SERPL-SCNC: 94 MMOL/L (ref 98–107)
CO2 SERPL-SCNC: 41 MMOL/L (ref 20–28)
CREAT SERPL-MCNC: 0.51 MG/DL (ref 0.6–1.1)
CRP SERPL-MCNC: 42 MG/L (ref 0–10)
DIFFERENTIAL METHOD BLD: ABNORMAL
EOSINOPHIL # BLD: 0 K/UL (ref 0–0.8)
EOSINOPHIL NFR BLD: 0 % (ref 0.5–7.8)
ERYTHROCYTE [DISTWIDTH] IN BLOOD BY AUTOMATED COUNT: 13.1 % (ref 11.9–14.6)
FERRITIN SERPL-MCNC: 50 NG/ML (ref 8–388)
FOLATE SERPL-MCNC: 26.3 NG/ML (ref 3.1–17.5)
GLOBULIN SER CALC-MCNC: 4.3 G/DL (ref 2.3–3.5)
GLUCOSE BLD STRIP.AUTO-MCNC: 125 MG/DL (ref 65–100)
GLUCOSE BLD STRIP.AUTO-MCNC: 214 MG/DL (ref 65–100)
GLUCOSE SERPL-MCNC: 116 MG/DL (ref 70–99)
HCT VFR BLD AUTO: 29.7 % (ref 35.8–46.3)
HGB BLD-MCNC: 8.8 G/DL (ref 11.7–15.4)
IMM GRANULOCYTES # BLD AUTO: 0 K/UL (ref 0–0.5)
IMM GRANULOCYTES NFR BLD AUTO: 1 % (ref 0–5)
IRON SATN MFR SERPL: 18 % (ref 20–50)
IRON SERPL-MCNC: 47 UG/DL (ref 35–100)
LYMPHOCYTES # BLD: 2.6 K/UL (ref 0.5–4.6)
LYMPHOCYTES NFR BLD: 41 % (ref 13–44)
MCH RBC QN AUTO: 26.9 PG (ref 26.1–32.9)
MCHC RBC AUTO-ENTMCNC: 29.6 G/DL (ref 31.4–35)
MCV RBC AUTO: 90.8 FL (ref 82–102)
MONOCYTES # BLD: 0.6 K/UL (ref 0.1–1.3)
MONOCYTES NFR BLD: 9 % (ref 4–12)
NEUTS SEG # BLD: 3.1 K/UL (ref 1.7–8.2)
NEUTS SEG NFR BLD: 48 % (ref 43–78)
NRBC # BLD: 0 K/UL (ref 0–0.2)
PLATELET # BLD AUTO: 234 K/UL (ref 150–450)
PMV BLD AUTO: 10.2 FL (ref 9.4–12.3)
POTASSIUM SERPL-SCNC: 3.7 MMOL/L (ref 3.5–5.1)
PROT SERPL-MCNC: 7.4 G/DL (ref 6.3–8.2)
RBC # BLD AUTO: 3.27 M/UL (ref 4.05–5.2)
SERVICE CMNT-IMP: ABNORMAL
SERVICE CMNT-IMP: ABNORMAL
SODIUM SERPL-SCNC: 141 MMOL/L (ref 136–145)
TIBC SERPL-MCNC: 259 UG/DL (ref 240–450)
UIBC SERPL-MCNC: 212 UG/DL (ref 112–347)
VIT B12 SERPL-MCNC: >4000 PG/ML (ref 193–986)
WBC # BLD AUTO: 6.4 K/UL (ref 4.3–11.1)

## 2024-08-23 PROCEDURE — 82728 ASSAY OF FERRITIN: CPT

## 2024-08-23 PROCEDURE — 82607 VITAMIN B-12: CPT

## 2024-08-23 PROCEDURE — 6370000000 HC RX 637 (ALT 250 FOR IP): Performed by: INTERNAL MEDICINE

## 2024-08-23 PROCEDURE — 82746 ASSAY OF FOLIC ACID SERUM: CPT

## 2024-08-23 PROCEDURE — 6360000002 HC RX W HCPCS: Performed by: NURSE PRACTITIONER

## 2024-08-23 PROCEDURE — 6360000002 HC RX W HCPCS: Performed by: INTERNAL MEDICINE

## 2024-08-23 PROCEDURE — 94660 CPAP INITIATION&MGMT: CPT

## 2024-08-23 PROCEDURE — 6370000000 HC RX 637 (ALT 250 FOR IP): Performed by: PHYSICIAN ASSISTANT

## 2024-08-23 PROCEDURE — 80053 COMPREHEN METABOLIC PANEL: CPT

## 2024-08-23 PROCEDURE — 99232 SBSQ HOSP IP/OBS MODERATE 35: CPT | Performed by: INTERNAL MEDICINE

## 2024-08-23 PROCEDURE — 97530 THERAPEUTIC ACTIVITIES: CPT

## 2024-08-23 PROCEDURE — 94640 AIRWAY INHALATION TREATMENT: CPT

## 2024-08-23 PROCEDURE — 6370000000 HC RX 637 (ALT 250 FOR IP): Performed by: STUDENT IN AN ORGANIZED HEALTH CARE EDUCATION/TRAINING PROGRAM

## 2024-08-23 PROCEDURE — 82962 GLUCOSE BLOOD TEST: CPT

## 2024-08-23 PROCEDURE — 94761 N-INVAS EAR/PLS OXIMETRY MLT: CPT

## 2024-08-23 PROCEDURE — 83550 IRON BINDING TEST: CPT

## 2024-08-23 PROCEDURE — 94760 N-INVAS EAR/PLS OXIMETRY 1: CPT

## 2024-08-23 PROCEDURE — 1100000000 HC RM PRIVATE

## 2024-08-23 PROCEDURE — 36415 COLL VENOUS BLD VENIPUNCTURE: CPT

## 2024-08-23 PROCEDURE — 83540 ASSAY OF IRON: CPT

## 2024-08-23 PROCEDURE — 2580000003 HC RX 258: Performed by: INTERNAL MEDICINE

## 2024-08-23 PROCEDURE — 2700000000 HC OXYGEN THERAPY PER DAY

## 2024-08-23 PROCEDURE — 85025 COMPLETE CBC W/AUTO DIFF WBC: CPT

## 2024-08-23 RX ORDER — ENOXAPARIN SODIUM 100 MG/ML
40 INJECTION SUBCUTANEOUS EVERY 24 HOURS
Status: DISCONTINUED | OUTPATIENT
Start: 2024-08-23 | End: 2024-08-24 | Stop reason: HOSPADM

## 2024-08-23 RX ADMIN — TADALAFIL 20 MG: 20 TABLET ORAL at 08:50

## 2024-08-23 RX ADMIN — ALBUTEROL SULFATE 2.5 MG: 2.5 SOLUTION RESPIRATORY (INHALATION) at 19:40

## 2024-08-23 RX ADMIN — TORSEMIDE 10 MG: 20 TABLET ORAL at 08:50

## 2024-08-23 RX ADMIN — PREDNISONE 20 MG: 20 TABLET ORAL at 08:50

## 2024-08-23 RX ADMIN — ENOXAPARIN SODIUM 40 MG: 100 INJECTION SUBCUTANEOUS at 12:58

## 2024-08-23 RX ADMIN — INSULIN LISPRO 1 UNITS: 100 INJECTION, SOLUTION INTRAVENOUS; SUBCUTANEOUS at 12:58

## 2024-08-23 RX ADMIN — INSULIN GLARGINE 9 UNITS: 100 INJECTION, SOLUTION SUBCUTANEOUS at 20:54

## 2024-08-23 RX ADMIN — ALBUTEROL SULFATE 2.5 MG: 2.5 SOLUTION RESPIRATORY (INHALATION) at 07:57

## 2024-08-23 RX ADMIN — ACETAMINOPHEN 650 MG: 325 TABLET ORAL at 15:32

## 2024-08-23 RX ADMIN — SODIUM CHLORIDE SOLN NEBU 3% 4 ML: 3 NEBU SOLN at 07:57

## 2024-08-23 ASSESSMENT — PAIN DESCRIPTION - PAIN TYPE: TYPE: ACUTE PAIN

## 2024-08-23 ASSESSMENT — PAIN DESCRIPTION - ORIENTATION: ORIENTATION: LEFT

## 2024-08-23 ASSESSMENT — PAIN SCALES - GENERAL
PAINLEVEL_OUTOF10: 0
PAINLEVEL_OUTOF10: 4
PAINLEVEL_OUTOF10: 0

## 2024-08-23 ASSESSMENT — PAIN DESCRIPTION - FREQUENCY: FREQUENCY: INTERMITTENT

## 2024-08-23 ASSESSMENT — PAIN DESCRIPTION - ONSET: ONSET: GRADUAL

## 2024-08-23 ASSESSMENT — PAIN DESCRIPTION - DESCRIPTORS: DESCRIPTORS: ACHING

## 2024-08-23 ASSESSMENT — PAIN - FUNCTIONAL ASSESSMENT: PAIN_FUNCTIONAL_ASSESSMENT: ACTIVITIES ARE NOT PREVENTED

## 2024-08-23 ASSESSMENT — PAIN DESCRIPTION - LOCATION: LOCATION: HEAD

## 2024-08-23 NOTE — PROGRESS NOTES
Li Mosquera  Admission Date: 8/20/2024         Daily Progress Note: 8/23/2024    The patient's chart is reviewed and the patient is discussed with the staff.    Background: 78 y.o.  female seen and evaluated at the request of Dr. Keshawn Metz, hospitalist for shortness of breath and pulmonary hypertension. She has an extensive history of pulmonary sarcoidosis/ILD, HAO intolerant of CPAP, pulmonary HTN group 1, 3, 5, DM2, HLD, bronchiectasis, chronic respiratory failure     Patient was seen 8/20 by Dr. Booth in the office.  She was noted to desaturate into the 70s with 6-minute walk test on baseline oxygen requirement with exertion.  She was sent to the ED for further evaluation and was subsequently admitted for shortness of breath, pulmonary hypertension, and increased dyspnea. Plan for further workup w/ echo to evaluate right side heart pressures.   She says she has noticed weakness, increased dyspnea, more \"gravity,\" and \"everything going down\" when she stands for the past 2 weeks.  She endorses decreased energy for the past several months.  She has a chronic cough with yellow/green sputum.She says she uses her Tyvaso at least 3 times a day, but ordered 4 times a day. Says she does not wear CPAP because she cannot tolerate the mask.    Pulmonary Hypertension Therapy:  - Tyvaso 64 mcg 4 times daily  - tadalafil 20mg daily (intolerant to 40mg does due to muscle pain)  - torsemide 10mg daily     Subjective:     States she is feeling better today and actually has more energy after starting prednisone. Wore home CPAP again last night for 8 hours ( showed rachid report). She still hates the CPAP mask on her face but states she will wear it. Walked with PT this morning on 6 lpm and did well.     Current Facility-Administered Medications   Medication Dose Route Frequency    predniSONE (DELTASONE) tablet 20 mg  20 mg Oral Daily    insulin glargine (LANTUS) injection vial 9 Units  9  due to idiopathic cause, pulmonary fibrosis, sarcoidosis. Her PVR is >8WU and last echo suggested RVSP is 58.   --Tyvaso QID  --Tildalafil 20mg daily  --Torsemide  --Albuterol PRN    **Needs 2-4 week appointment with April or Dr. Booth in PH clinic**  Message sent to triage today      More than 50% of the time documented was spent in face-to-face contact with the patient and in the care of the patient on the floor/unit where the patient is located.    In this split/shared evaluation I performed performed a medically appropriate history and exam, counseled and educated the patient and/or family member, documented information in EMR, and coordinated care. which accounted for 15 minutes of clinical time.     BOAR Molina - NP    In this split/shared evaluation I performed reviewed the patients's H&P, available images, labs, cultures., discussed case in detail with NPP, performed a medically appropriate history and exam, counseled and educated the patient and/or family member, ordered and/or reviewed medications, tests or procedures, documented information in EMR, independently interpreted images, and coordinated care. which accounted for 16 minutes clinical time.     Impression: Feels a little better today, walked and down to 6L NC. Continue steroids. CRP and ACE level still pending. Planned for 20mg prednisone x 2 weeks then 10mg prednisone x 2 weeks and follow up in office in 1-2 weeks. Hope to get her home in the next 1-2 days as O2 improves.    Desirae Barry MD

## 2024-08-23 NOTE — PROGRESS NOTES
Hospitalist Progress Note   Admit Date:  2024 11:35 AM   Name:  Li Mosquera   Age:  78 y.o.  Sex:  female  :  1945   MRN:  [id number]   Room:  [facility]    Presenting/Chief Complaint: Shortness of Breath     Reason(s) for Admission: Shortness of breath [R06.02]  Pulmonary hypertension (HCC) [I27.20]     Hospital Course:   Li Mosquera is a 78 y.o. AAF w/ a PMH of HTN, COPD, sarcoidosis of lung, chronic hypoxic respiratory failure on 4L of O2 at baseline, HAO who presented from her pulmonologist office with hypoxia as she was found to have O2 sats in the 70's on her baseline 4L of O2.  Pulmonology was consulted who recommended CT which showed no e/o PE, chronic pulm HTN, chronic ILD, subcentimeter mediastinal adenopathy.  ECHO showed EF of 60 - 65%, normal diastolic function for age, and RVSP 69 mmHg.     She was to be discharged home on  as her O2 sats were >90% at rest however with ambulation required 8L to keep her sats >90%.      Subjective & 24hr Events:   Pleasant patient seen sitting on edge of bed.  Later ambulating w/ PT and still having significant O2 requirements.  The patient said she was hopeful to leave by tomorrow.  Will need to continue monitoring today and tonight as she is still not ready.  Follow up on Pulmonology recommendations.    Assessment & Plan:     Principal Problem:  Acute on chronic respiratory failure with hypoxia (HCC) with baseline O2 4L however exertional requirements are 8L  Sarcoidosis of lung (HCC)  Interstitial lung disease (HCC)  Pulmonary hypertension (HCC)  - Resp viral panel on Aug/20 was negative  - prednisone added by pulmonology  - continue tadalafil, treprostinil, torsemide, and nebulizers     Active Problems:  HAO on CPAP  - continue CPAP and discuss importance of compliance with patient     Type 2 diabetes mellitus without complication, without long-term current use of insulin (HCC)   - A1c 7.7  - 9U Lantus qHS and low dose correctional  42 13 - 44 %    Monocytes % 8 4.0 - 12.0 %    Eosinophils % 4 0.5 - 7.8 %    Basophils % 1 0.0 - 2.0 %    Immature Granulocytes % 0 0.0 - 5.0 %    Neutrophils Absolute 3.1 1.7 - 8.2 K/UL    Lymphocytes Absolute 3.0 0.5 - 4.6 K/UL    Monocytes Absolute 0.6 0.1 - 1.3 K/UL    Eosinophils Absolute 0.3 0.0 - 0.8 K/UL    Basophils Absolute 0.1 0.0 - 0.2 K/UL    Immature Granulocytes Absolute 0.0 0.0 - 0.5 K/UL   Comprehensive Metabolic Panel    Collection Time: 08/22/24  3:23 AM   Result Value Ref Range    Sodium 142 136 - 145 mmol/L    Potassium 3.5 3.5 - 5.1 mmol/L    Chloride 94 (L) 98 - 107 mmol/L    CO2 42 (H) 20 - 28 mmol/L    Anion Gap 6 (L) 9 - 18 mmol/L    Glucose 117 (H) 70 - 99 mg/dL    BUN 13 8 - 23 MG/DL    Creatinine 0.58 (L) 0.60 - 1.10 MG/DL    Est, Glom Filt Rate >90 >60 ml/min/1.73m2    Calcium 9.3 8.8 - 10.2 MG/DL    Total Bilirubin 0.2 0.0 - 1.2 MG/DL    ALT <5 (L) 12 - 65 U/L    AST 19 15 - 37 U/L    Alk Phosphatase 70 35 - 104 U/L    Total Protein 7.4 6.3 - 8.2 g/dL    Albumin 3.1 (L) 3.2 - 4.6 g/dL    Globulin 4.3 (H) 2.3 - 3.5 g/dL    Albumin/Globulin Ratio 0.7 (L) 1.0 - 1.9     POCT Glucose    Collection Time: 08/22/24  8:18 AM   Result Value Ref Range    POC Glucose 149 (H) 65 - 100 mg/dL    Performed by: Arnold    POCT Glucose    Collection Time: 08/22/24 11:15 AM   Result Value Ref Range    POC Glucose 235 (H) 65 - 100 mg/dL    Performed by: Jayne    POCT Glucose    Collection Time: 08/22/24  5:02 PM   Result Value Ref Range    POC Glucose 172 (H) 65 - 100 mg/dL    Performed by: Arnold    POCT Glucose    Collection Time: 08/22/24  8:43 PM   Result Value Ref Range    POC Glucose 340 (H) 65 - 100 mg/dL    Performed by: Abran    CBC with Auto Differential    Collection Time: 08/23/24  3:40 AM   Result Value Ref Range    WBC 6.4 4.3 - 11.1 K/uL    RBC 3.27 (L) 4.05 - 5.2 M/uL    Hemoglobin 8.8 (L) 11.7 - 15.4 g/dL    Hematocrit 29.7 (L) 35.8 - 46.3 %    MCV 90.8  82 - 102 FL    MCH 26.9 26.1 - 32.9 PG    MCHC 29.6 (L) 31.4 - 35.0 g/dL    RDW 13.1 11.9 - 14.6 %    Platelets 234 150 - 450 K/uL    MPV 10.2 9.4 - 12.3 FL    nRBC 0.00 0.0 - 0.2 K/uL    Differential Type AUTOMATED      Neutrophils % 48 43 - 78 %    Lymphocytes % 41 13 - 44 %    Monocytes % 9 4.0 - 12.0 %    Eosinophils % 0 (L) 0.5 - 7.8 %    Basophils % 1 0.0 - 2.0 %    Immature Granulocytes % 1 0.0 - 5.0 %    Neutrophils Absolute 3.1 1.7 - 8.2 K/UL    Lymphocytes Absolute 2.6 0.5 - 4.6 K/UL    Monocytes Absolute 0.6 0.1 - 1.3 K/UL    Eosinophils Absolute 0.0 0.0 - 0.8 K/UL    Basophils Absolute 0.0 0.0 - 0.2 K/UL    Immature Granulocytes Absolute 0.0 0.0 - 0.5 K/UL   Comprehensive Metabolic Panel    Collection Time: 08/23/24  3:40 AM   Result Value Ref Range    Sodium 141 136 - 145 mmol/L    Potassium 3.7 3.5 - 5.1 mmol/L    Chloride 94 (L) 98 - 107 mmol/L    CO2 41 (H) 20 - 28 mmol/L    Anion Gap 6 (L) 9 - 18 mmol/L    Glucose 116 (H) 70 - 99 mg/dL    BUN 16 8 - 23 MG/DL    Creatinine 0.51 (L) 0.60 - 1.10 MG/DL    Est, Glom Filt Rate >90 >60 ml/min/1.73m2    Calcium 9.6 8.8 - 10.2 MG/DL    Total Bilirubin 0.2 0.0 - 1.2 MG/DL    ALT <5 (L) 12 - 65 U/L    AST 18 15 - 37 U/L    Alk Phosphatase 69 35 - 104 U/L    Total Protein 7.4 6.3 - 8.2 g/dL    Albumin 3.1 (L) 3.2 - 4.6 g/dL    Globulin 4.3 (H) 2.3 - 3.5 g/dL    Albumin/Globulin Ratio 0.7 (L) 1.0 - 1.9     POCT Glucose    Collection Time: 08/23/24  7:43 AM   Result Value Ref Range    POC Glucose 125 (H) 65 - 100 mg/dL    Performed by: Dominique        Recent Labs     08/20/24  1228   COVID19 NOT DETECTED       Current Meds:  Current Facility-Administered Medications   Medication Dose Route Frequency    predniSONE (DELTASONE) tablet 20 mg  20 mg Oral Daily    insulin glargine (LANTUS) injection vial 9 Units  9 Units SubCUTAneous Nightly    albuterol (PROVENTIL) (2.5 MG/3ML) 0.083% nebulizer solution 2.5 mg  2.5 mg Nebulization BID    sodium

## 2024-08-23 NOTE — PROGRESS NOTES
ACUTE PHYSICAL THERAPY GOALS:   (Developed with and agreed upon by patient and/or caregiver.)  LTG:  (1.)Ms. Mosquera will move from supine to sit and sit to supine , scoot up and down, and roll side to side in bed with INDEPENDENT within 7 treatment day(s).    (2.)Ms. Mosquera will transfer from bed to chair and chair to bed with MODIFIED INDEPENDENCE using the least restrictive device within 7 treatment day(s).    (3.)Ms. Mosquera will ambulate with MODIFIED INDEPENDENCE for 300 feet with the least restrictive device within 7 treatment day(s).  (4.)Ms. Mosquera will tolerate at least 23 min of dynamic standing activity to assist standing ADLs with the least restrictive device within 7 treatment days.    PHYSICAL THERAPY: Daily Note AM   (Link to Caseload Tracking: PT Visit Days : 2  Time In/Out PT Charge Capture  Rehab Caseload Tracker  Orders    Li Mosquera is a 78 y.o. female   PRIMARY DIAGNOSIS: Acute on chronic respiratory failure with hypoxia (HCC)  Shortness of breath [R06.02]  Pulmonary hypertension (HCC) [I27.20]       Inpatient: Payor: [employer] MEDICARE / Plan: [employer] GOLD PLUS HMO / Product Type: *No Product type* /     ASSESSMENT:     REHAB RECOMMENDATIONS:   Recommendation to date pending progress:  Setting:  Outpatient Therapy  For R knee pain    Equipment:    None     ASSESSMENT:  Ms. Mosquera presents in bedside chair on 4L, agreeable to session. While ambulating on 6L, frequent standing rest breaks are utilized to recover oxygen saturation from mid 80s back up to low 90s. Following ambulation, she is shown R knee mobility exercises to assist w/ R knee arthritic pain.      Upon entering, Pnt is agreeable to PT treatment.  she reports 2/10 pain in her R knee at rest. Pnt performed Sit > stand with SBA using RW. Gait 4 x 125 ft with SBA and RW, cues for step length and pursed lip breathing.  Gait is noted to be slowed and shuffled. Stand > sit with SBA, followed by positioning for comfort. At end of session pt

## 2024-08-23 NOTE — PLAN OF CARE
Problem: Respiratory - Adult  Goal: Achieves optimal ventilation and oxygenation  Outcome: Progressing  Flowsheets (Taken 8/23/2024 0801)  Achieves optimal ventilation and oxygenation:   Assess for changes in respiratory status   Respiratory therapy support as indicated   Position to facilitate oxygenation and minimize respiratory effort   Assess and instruct to report shortness of breath or any respiratory difficulty   Encourage broncho-pulmonary hygiene including cough, deep breathe, incentive spirometry   Assess for changes in mentation and behavior

## 2024-08-24 VITALS
SYSTOLIC BLOOD PRESSURE: 104 MMHG | TEMPERATURE: 97.5 F | HEIGHT: 63 IN | WEIGHT: 159 LBS | RESPIRATION RATE: 15 BRPM | OXYGEN SATURATION: 90 % | BODY MASS INDEX: 28.17 KG/M2 | DIASTOLIC BLOOD PRESSURE: 56 MMHG | HEART RATE: 55 BPM

## 2024-08-24 LAB
GLUCOSE BLD STRIP.AUTO-MCNC: 108 MG/DL (ref 65–100)
GLUCOSE BLD STRIP.AUTO-MCNC: 244 MG/DL (ref 65–100)
SERVICE CMNT-IMP: ABNORMAL
SERVICE CMNT-IMP: ABNORMAL

## 2024-08-24 PROCEDURE — 82962 GLUCOSE BLOOD TEST: CPT

## 2024-08-24 PROCEDURE — 94760 N-INVAS EAR/PLS OXIMETRY 1: CPT

## 2024-08-24 PROCEDURE — 2580000003 HC RX 258: Performed by: INTERNAL MEDICINE

## 2024-08-24 PROCEDURE — 6370000000 HC RX 637 (ALT 250 FOR IP): Performed by: INTERNAL MEDICINE

## 2024-08-24 PROCEDURE — 6370000000 HC RX 637 (ALT 250 FOR IP): Performed by: STUDENT IN AN ORGANIZED HEALTH CARE EDUCATION/TRAINING PROGRAM

## 2024-08-24 PROCEDURE — 2700000000 HC OXYGEN THERAPY PER DAY

## 2024-08-24 PROCEDURE — 94640 AIRWAY INHALATION TREATMENT: CPT

## 2024-08-24 PROCEDURE — 6360000002 HC RX W HCPCS: Performed by: INTERNAL MEDICINE

## 2024-08-24 RX ORDER — PREDNISONE 10 MG/1
TABLET ORAL
Qty: 40 TABLET | Refills: 0 | Status: SHIPPED | OUTPATIENT
Start: 2024-08-25 | End: 2024-09-21

## 2024-08-24 RX ADMIN — TORSEMIDE 10 MG: 20 TABLET ORAL at 09:25

## 2024-08-24 RX ADMIN — TADALAFIL 20 MG: 20 TABLET ORAL at 09:26

## 2024-08-24 RX ADMIN — PREDNISONE 20 MG: 20 TABLET ORAL at 09:23

## 2024-08-24 RX ADMIN — INSULIN LISPRO 1 UNITS: 100 INJECTION, SOLUTION INTRAVENOUS; SUBCUTANEOUS at 12:20

## 2024-08-24 RX ADMIN — SODIUM CHLORIDE SOLN NEBU 3% 4 ML: 3 NEBU SOLN at 08:11

## 2024-08-24 RX ADMIN — ALBUTEROL SULFATE 2.5 MG: 2.5 SOLUTION RESPIRATORY (INHALATION) at 08:11

## 2024-08-24 NOTE — PROGRESS NOTES
Patient discharged home all lines removed  discharge instructions/medications/upcoming appointments for continuation of care reviewed with patient verbalizing understanding    patient's belongings packed placed at bedside  Pt transported down to awaiting vehicle via wheelchair/tech

## 2024-08-24 NOTE — PROGRESS NOTES
Pt resting in bed. Respirations even and unlabored on CPAP with 4L bed in. No s/s of distress. Call light within reach. Preparing to give report to oncoming RN.      No acute events overnight.

## 2024-08-24 NOTE — DISCHARGE SUMMARY
Hospitalist Discharge Summary   Admit Date:  2024 11:35 AM   DC Note date: 2024  Name:  Li Mosquera   Age:  78 y.o.  Sex:  female  :  1945   MRN:  [id number]   Room:  [facility]  PCP:  Juan J Hawkins APRN - NP    Presenting Complaint: Shortness of Breath     Initial Admission Diagnosis: Shortness of breath [R06.02]  Pulmonary hypertension (HCC) [I27.20]     Problem List for this Hospitalization (present on admission):    Principal Problem:    Acute on chronic respiratory failure with hypoxia (HCC)  Active Problems:    HAO on CPAP    Sarcoidosis of lung (HCC)    Interstitial lung disease (HCC)    Pulmonary hypertension (HCC)    Acute on chronic respiratory failure with hypoxemia (HCC)    Controlled type 2 diabetes mellitus without complication, without long-term current use of insulin (HCC)  Resolved Problems:    * No resolved hospital problems. *      Hospital Course:  Li Mosquera is a 78 y.o. AAF w/ a PMH of HTN, COPD, sarcoidosis of lung, chronic hypoxic respiratory failure on 4L of O2 at baseline, HAO who presented from her pulmonologist's office with hypoxia as she was found to have SpO2 in the 70's on her baseline 4L of O2.  Pulmonology was consulted who recommended CT which showed no e/o PE, chronic pulm HTN, chronic ILD, subcentimeter mediastinal adenopathy.  ECHO showed EF of 60 - 65%, normal diastolic function for age, and RVSP 69 mmHg.      She was to be discharged home on  as her O2 sats were >90% at rest however with ambulation required 8L to keep her sats >90%.  The patient continued to improve with time and added prednisone taper.  She continues to feel at her baseline and wants to return home on Aug/24.  She continues to saturate well on her baseline flow.  She was ambulatory in her room this morning and was in no distress.  She states that she knows her limitations and will not engage in new activities.  She knows to rest if feeling short of breath.  I discussed this  NL63 by PCR NOT DETECTED        Coronavirus OC43 by PCR NOT DETECTED        SARS-CoV-2, PCR NOT DETECTED        Human Metapneumovirus by PCR NOT DETECTED        Rhinovirus Enterovirus PCR NOT DETECTED        Influenza A by PCR NOT DETECTED        Influenza B PCR NOT DETECTED        Parainfluenza 1 PCR NOT DETECTED        Parainfluenza 2 PCR NOT DETECTED        Parainfluenza 3 PCR NOT DETECTED        Parainfluenza 4 PCR NOT DETECTED        Respiratory Syncytial Virus by PCR NOT DETECTED        Bordetella parapertussis by PCR NOT DETECTED        Bordetella pertussis by PCR NOT DETECTED        Chlamydophila Pneumonia PCR NOT DETECTED        Mycoplasma pneumo by PCR NOT DETECTED               All Labs from Last 24 Hrs:  Recent Results (from the past 24 hour(s))   POCT Glucose    Collection Time: 08/23/24  8:18 PM   Result Value Ref Range    POC Glucose 214 (H) 65 - 100 mg/dL    Performed by: Alyse([employer])    POCT Glucose    Collection Time: 08/24/24  8:07 AM   Result Value Ref Range    POC Glucose 108 (H) 65 - 100 mg/dL    Performed by: Ernestine([employer])        No results for input(s): \"COVID19\" in the last 72 hours.    Recent Vital Data:  Patient Vitals for the past 24 hrs:   Temp Pulse Resp BP SpO2   08/24/24 0811 -- 81 15 -- 95 %   08/24/24 0235 98.1 °F (36.7 °C) 71 16 (!) 119/56 99 %   08/24/24 0000 -- -- -- 108/60 --   08/23/24 2346 98.1 °F (36.7 °C) 67 16 (!) 99/49 97 %   08/23/24 2316 -- 76 16 -- 95 %   08/23/24 1940 -- 80 16 -- 96 %   08/23/24 1926 97.7 °F (36.5 °C) 78 16 121/60 95 %   08/23/24 1510 98.4 °F (36.9 °C) 75 18 127/60 98 %   08/23/24 1115 98.4 °F (36.9 °C) 65 16 (!) 124/59 --       Oxygen Therapy  SpO2: 95 %  Pulse Oximeter Device Mode: Intermittent  Pulse Oximeter Device Location: Finger  O2 Device: Nasal cannula  Skin Assessment: Clean, dry, & intact  Skin Protection for O2 Device: No  O2 Flow Rate (L/min): 4 L/min    Estimated body mass index is 28.17 kg/m² as calculated

## 2024-08-24 NOTE — CARE COORDINATION
CM reviewed / screen patient for discharge today.  CM reviewed medical chart / discharge summary: Disposition: Home   and CM weekend report: weaning 02, will be here this weekend.  Patient had decline HH.  Family will transport patient home.  Patient has met all treatment goals / milestones.  CM will continue to follow and remain available for any needs, concerns or questions that may arise.          08/21/24 3030   Service Assessment   Patient Orientation Alert and Oriented   Cognition Alert   History Provided By Patient   Primary Caregiver Self   Accompanied By/Relationship no one at bedside   Support Systems Spouse/Significant Other   Patient's Healthcare Decision Maker is: Legal Next of Kin   PCP Verified by CM Yes   Last Visit to PCP Within last 3 months   Prior Functional Level Independent in ADLs/IADLs   Current Functional Level Independent in ADLs/IADLs   Can patient return to prior living arrangement Yes   Ability to make needs known: Good   Family able to assist with home care needs: Yes   Would you like for me to discuss the discharge plan with any other family members/significant others, and if so, who? Yes   Financial Resources Medicare   Community Resources None   Social/Functional History   Lives With Spouse   Type of Home House   Home Equipment Oxygen   Receives Help From Family   ADL Assistance Independent   Homemaking Assistance Independent   Homemaking Responsibilities No   Ambulation Assistance Independent   Transfer Assistance Independent   Active  No   Occupation Retired   Discharge Planning   Type of Residence House   Living Arrangements Spouse/Significant Other   Current Services Prior To Admission Oxygen Therapy   Potential Assistance Needed N/A   DME Ordered? No   Potential Assistance Purchasing Medications No   Type of Home Care Services None   Patient expects to be discharged to: House

## 2024-08-26 ENCOUNTER — TELEPHONE (OUTPATIENT)
Dept: PULMONOLOGY | Age: 79
End: 2024-08-26

## 2024-08-26 NOTE — TELEPHONE ENCOUNTER
Received: 4 days ago  Desirae Barry MD  [facility]  Possible TCM visit 1-2 weeks    D/C     Care Transitions Initial Follow Up Call    Outreach made within 2 business days of discharge: Yes    Patient: Li Mosquera Patient : 1945   MRN: [id number]  Reason for Admission:   Initial Admission Diagnosis: Shortness of breath [R06.02]  Pulmonary hypertension (HCC) [I27.20]     Discharge Date: 24       Spoke with: patient    Discharge department/facility: home    TCM Interactive Patient Contact:  Was patient able to fill all prescriptions: Yes  Was patient instructed to bring all medications to the follow-up visit: Yes  Is patient taking all medications as directed in the discharge summary? Yes  Does patient understand their discharge instructions: Yes  Does patient have questions or concerns that need addressed prior to 7-14 day follow up office visit: no    Additional needs identified to be addressed with provider  No needs identified             Scheduled appointment with PCP within 7-14 days    Follow Up  Future Appointments   Date Time Provider Department Center   2024 10:15 AM Desirae Barry MD [facility], RCP

## 2024-08-30 ENCOUNTER — OFFICE VISIT (OUTPATIENT)
Dept: PULMONOLOGY | Age: 79
End: 2024-08-30
Payer: MEDICARE

## 2024-08-30 VITALS
SYSTOLIC BLOOD PRESSURE: 120 MMHG | HEIGHT: 63 IN | HEART RATE: 60 BPM | OXYGEN SATURATION: 98 % | TEMPERATURE: 98 F | BODY MASS INDEX: 27.82 KG/M2 | DIASTOLIC BLOOD PRESSURE: 80 MMHG | RESPIRATION RATE: 20 BRPM | WEIGHT: 157 LBS

## 2024-08-30 DIAGNOSIS — D86.0 PULMONARY SARCOIDOSIS (HCC): ICD-10-CM

## 2024-08-30 DIAGNOSIS — G47.33 OSA (OBSTRUCTIVE SLEEP APNEA): ICD-10-CM

## 2024-08-30 DIAGNOSIS — J96.11 CHRONIC RESPIRATORY FAILURE WITH HYPOXIA (HCC): Primary | ICD-10-CM

## 2024-08-30 DIAGNOSIS — I27.20 PULMONARY HYPERTENSION (HCC): ICD-10-CM

## 2024-08-30 PROCEDURE — 1123F ACP DISCUSS/DSCN MKR DOCD: CPT | Performed by: INTERNAL MEDICINE

## 2024-08-30 PROCEDURE — G8399 PT W/DXA RESULTS DOCUMENT: HCPCS | Performed by: INTERNAL MEDICINE

## 2024-08-30 PROCEDURE — 1111F DSCHRG MED/CURRENT MED MERGE: CPT | Performed by: INTERNAL MEDICINE

## 2024-08-30 PROCEDURE — G8417 CALC BMI ABV UP PARAM F/U: HCPCS | Performed by: INTERNAL MEDICINE

## 2024-08-30 PROCEDURE — 99214 OFFICE O/P EST MOD 30 MIN: CPT | Performed by: INTERNAL MEDICINE

## 2024-08-30 PROCEDURE — G8427 DOCREV CUR MEDS BY ELIG CLIN: HCPCS | Performed by: INTERNAL MEDICINE

## 2024-08-30 PROCEDURE — 1036F TOBACCO NON-USER: CPT | Performed by: INTERNAL MEDICINE

## 2024-08-30 PROCEDURE — G2211 COMPLEX E/M VISIT ADD ON: HCPCS | Performed by: INTERNAL MEDICINE

## 2024-08-30 PROCEDURE — 1090F PRES/ABSN URINE INCON ASSESS: CPT | Performed by: INTERNAL MEDICINE

## 2024-08-30 RX ORDER — GLIPIZIDE 2.5 MG/1
2.5 TABLET, EXTENDED RELEASE ORAL DAILY
COMMUNITY
Start: 2024-08-28 | End: 2025-08-28

## 2024-08-30 RX ORDER — SODIUM CHLORIDE FOR INHALATION 3 %
4 VIAL, NEBULIZER (ML) INHALATION 2 TIMES DAILY
Qty: 250 ML | Refills: 11 | Status: SHIPPED | OUTPATIENT
Start: 2024-08-30

## 2024-08-30 RX ORDER — ALBUTEROL SULFATE 0.83 MG/ML
2.5 SOLUTION RESPIRATORY (INHALATION) EVERY 6 HOURS PRN
Qty: 360 EACH | Refills: 11 | Status: SHIPPED | OUTPATIENT
Start: 2024-08-30

## 2024-08-30 NOTE — PROGRESS NOTES
Name:  Li Mosquera  YOB: 1945   MRN: [id number]      Office Visit: 8/30/2024     ASSESSMENT AND PLAN:  (Medical Decision Making)    Impression: 78 y.o. female pulmonary sarcoidosis, pulmonary hypertension followed by Leobardo/April team in our office. Sent to ER on 8/20 from Dr. Booth's office visit due to profound hypoxia and SOB on 6 minute walk testing. Also has a hx of HAO but intolerant to CPAP mask. On tadalafil, torsemide, and tyvaso for PH. CT on arrival to hospital showed mild increase in GGO. No evidence of volume overload. Possibility of sarcoid inflammation started prednisone 8/22.      1. Chronic respiratory failure with hypoxia (HCC)  She ambulated today on her portable oxygen concentrator with conserving setting on 5 L without desaturation.  She felt well and feels improved since starting on steroids and discharge.    2. Pulmonary hypertension (HCC)  Remains stable on Tyvaso, tadalafil and torsemide.  No lower extremity edema and her shortness of breath is back to baseline.    3. Pulmonary sarcoidosis (HCC)  CT scan appeared to show some increased groundglass infiltrates and possible sarcoidosis flare.  Her CRP was elevated and she was initiated on steroids.  Given lack of tolerance previously I put her on just 20 mg of prednisone once daily for 2 weeks and 10 mg for 2 weeks.  She is only a weekend of this, but is doing well.  We will finish out this regimen and not put her on any steroid sparing agent at this time, but if she were to have needs for recurrent steroids in the future that would need to be considered.  She already has follow-up with Dr. Booth in December and we can leave that follow-up for now.  I will however get her to have repeat complete PFTs in 1 to 2 months to add to her ongoing monitoring and evaluation  - albuterol (PROVENTIL) (2.5 MG/3ML) 0.083% nebulizer solution; Take 3 mLs by nebulization every 6 hours as needed for Wheezing  Dispense: 360 each; Refill:

## 2024-09-11 ENCOUNTER — TELEPHONE (OUTPATIENT)
Dept: PULMONOLOGY | Age: 79
End: 2024-09-11

## 2024-09-11 DIAGNOSIS — I27.20 PULMONARY HYPERTENSION (HCC): Primary | ICD-10-CM

## 2024-10-17 ENCOUNTER — NURSE ONLY (OUTPATIENT)
Dept: PULMONOLOGY | Age: 79
End: 2024-10-17

## 2024-10-17 DIAGNOSIS — D86.0 SARCOIDOSIS OF LUNG (HCC): Primary | ICD-10-CM

## 2024-10-17 LAB
FEF25-27, POC: 1.32 L/S
FET, POC: NORMAL
FEV 1 , POC: 0.75 L
FEV1/FVC, POC: NORMAL
FVC, POC: NORMAL
LUNG AGE, POC: NORMAL
PEF, POC: 3.83 L/S

## 2024-10-31 RX ORDER — TORSEMIDE 10 MG/1
10 TABLET ORAL DAILY
Qty: 90 TABLET | Refills: 3 | Status: SHIPPED | OUTPATIENT
Start: 2024-10-31

## 2024-10-31 NOTE — TELEPHONE ENCOUNTER
Patients pharmacy requesting a refill on patients torsemide (DEMADEX) 10 MG tablets. Last seen by Dr. Booth on 08/20/24 and has a return appt on 12/24/24. ANASTASIA

## 2024-12-19 NOTE — PROGRESS NOTES
Dr. Shilpi Booth MD  [address]  [place] [address]  [phone]    Patient Name:  Li Mosquera  YOB: 1945  Office Visit: 12/24/2024    ASSESSMENT AND PLAN:  (Medical Decision Making)    1. Pulmonary hypertension (HCC)  Group 1,3,5 due to idiopathic cause, pulmonary fibrosis, sarcoidosis. Her PVR is >8WU and echo and symptoms are worsened, now Functional Class III.  She requires multidrug vasodilator therapy given the severity of her illness.  Strongly recommend she continue tyvaso 64mcg which has benefited her significantly.  Also recommend she continue tadalafil.  Recommend addition of sotatercept and provided patient with information regarding this medication.      2.  Acute bronchitis  Prescribed azithromycin and a cough suppressant for nighttime use.  Suggested she use her nebulizers for better airway clearance.      Shilpi Booth MD    Total time for encounter on day of encounter was 40 minutes.  This time includes chart prep, review of tests/procedures, review of other provider's notes, documentation and counseling patient regarding disease process and medications.     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 Symptoms of lower extremity edema occurred after embolization prompted evaluation for PH.  The LVEDP and PCWP were low, ruling out PH due to left heart disease.   She has had problems with leg swelling and fatigue as well as chronic hypoxemic respiratory failure.  She is now on 3lpm of continuous oxygen or 4-5lpm

## 2024-12-24 ENCOUNTER — OFFICE VISIT (OUTPATIENT)
Dept: PULMONOLOGY | Age: 79
End: 2024-12-24
Payer: MEDICARE

## 2024-12-24 VITALS
RESPIRATION RATE: 19 BRPM | OXYGEN SATURATION: 92 % | DIASTOLIC BLOOD PRESSURE: 62 MMHG | HEART RATE: 78 BPM | TEMPERATURE: 98.1 F | BODY MASS INDEX: 28.35 KG/M2 | SYSTOLIC BLOOD PRESSURE: 108 MMHG | WEIGHT: 160 LBS | HEIGHT: 63 IN

## 2024-12-24 DIAGNOSIS — I27.20 PULMONARY HYPERTENSION (HCC): ICD-10-CM

## 2024-12-24 DIAGNOSIS — J40 BRONCHITIS: ICD-10-CM

## 2024-12-24 DIAGNOSIS — J40 BRONCHITIS: Primary | ICD-10-CM

## 2024-12-24 LAB
BASOPHILS # BLD: 0.1 K/UL (ref 0–0.2)
BASOPHILS NFR BLD: 1 % (ref 0–2)
DIFFERENTIAL METHOD BLD: ABNORMAL
EOSINOPHIL # BLD: 0.3 K/UL (ref 0–0.8)
EOSINOPHIL NFR BLD: 3 % (ref 0.5–7.8)
ERYTHROCYTE [DISTWIDTH] IN BLOOD BY AUTOMATED COUNT: 13.2 % (ref 11.9–14.6)
HCT VFR BLD AUTO: 30.2 % (ref 35.8–46.3)
HGB BLD-MCNC: 8.7 G/DL (ref 11.7–15.4)
IMM GRANULOCYTES # BLD AUTO: 0 K/UL (ref 0–0.5)
IMM GRANULOCYTES NFR BLD AUTO: 0 % (ref 0–5)
LYMPHOCYTES # BLD: 2.4 K/UL (ref 0.5–4.6)
LYMPHOCYTES NFR BLD: 29 % (ref 13–44)
MCH RBC QN AUTO: 27.2 PG (ref 26.1–32.9)
MCHC RBC AUTO-ENTMCNC: 28.8 G/DL (ref 31.4–35)
MCV RBC AUTO: 94.4 FL (ref 82–102)
MONOCYTES # BLD: 0.7 K/UL (ref 0.1–1.3)
MONOCYTES NFR BLD: 8 % (ref 4–12)
NEUTS SEG # BLD: 4.9 K/UL (ref 1.7–8.2)
NEUTS SEG NFR BLD: 59 % (ref 43–78)
NRBC # BLD: 0 K/UL (ref 0–0.2)
PLATELET # BLD AUTO: 187 K/UL (ref 150–450)
PMV BLD AUTO: 10.6 FL (ref 9.4–12.3)
RBC # BLD AUTO: 3.2 M/UL (ref 4.05–5.2)
WBC # BLD AUTO: 8.4 K/UL (ref 4.3–11.1)

## 2024-12-24 PROCEDURE — G8417 CALC BMI ABV UP PARAM F/U: HCPCS | Performed by: INTERNAL MEDICINE

## 2024-12-24 PROCEDURE — 1123F ACP DISCUSS/DSCN MKR DOCD: CPT | Performed by: INTERNAL MEDICINE

## 2024-12-24 PROCEDURE — 1036F TOBACCO NON-USER: CPT | Performed by: INTERNAL MEDICINE

## 2024-12-24 PROCEDURE — 1159F MED LIST DOCD IN RCRD: CPT | Performed by: INTERNAL MEDICINE

## 2024-12-24 PROCEDURE — G8427 DOCREV CUR MEDS BY ELIG CLIN: HCPCS | Performed by: INTERNAL MEDICINE

## 2024-12-24 PROCEDURE — 1160F RVW MEDS BY RX/DR IN RCRD: CPT | Performed by: INTERNAL MEDICINE

## 2024-12-24 PROCEDURE — G8399 PT W/DXA RESULTS DOCUMENT: HCPCS | Performed by: INTERNAL MEDICINE

## 2024-12-24 PROCEDURE — 1090F PRES/ABSN URINE INCON ASSESS: CPT | Performed by: INTERNAL MEDICINE

## 2024-12-24 PROCEDURE — 99215 OFFICE O/P EST HI 40 MIN: CPT | Performed by: INTERNAL MEDICINE

## 2024-12-24 PROCEDURE — G8482 FLU IMMUNIZE ORDER/ADMIN: HCPCS | Performed by: INTERNAL MEDICINE

## 2024-12-24 RX ORDER — AZITHROMYCIN 250 MG/1
TABLET, FILM COATED ORAL
Qty: 6 TABLET | Refills: 0 | Status: SHIPPED | OUTPATIENT
Start: 2024-12-24 | End: 2025-01-03

## 2024-12-24 RX ORDER — HYDROCODONE BITARTRATE AND HOMATROPINE METHYLBROMIDE ORAL SOLUTION 5; 1.5 MG/5ML; MG/5ML
5 LIQUID ORAL 4 TIMES DAILY PRN
Qty: 240 ML | Refills: 0 | Status: SHIPPED | OUTPATIENT
Start: 2024-12-24 | End: 2025-01-23

## 2024-12-24 NOTE — PATIENT INSTRUCTIONS
Please read about sotatercept.  I recommend that we start this in 2025 with injections once every 3 weeks.      [email or web address]  Videos at the website above.    We will do our best to help keep you on the tyvaso.  I think it is a very important part of your care.  I suspect we will need to write a letter on your  behalf.  Margaret Han in our office will help us take the appropriate steps.      If you have questions or concerns regarding your recently prescribed specialty drug SOTATERCEPT, including approval or delivery status, please contact Rubén at the number below.

## 2025-01-02 ENCOUNTER — TELEPHONE (OUTPATIENT)
Dept: PULMONOLOGY | Age: 80
End: 2025-01-02

## 2025-01-02 NOTE — TELEPHONE ENCOUNTER
----- Message from Dr. Shilpi Booth MD sent at 12/24/2024 10:45 AM EST -----  Patient got a letter saying tyvaso won't be covered in 2025.  We need to help get her this medication any way possible.  Also we need to get her on sotatercept.

## 2025-01-03 NOTE — TELEPHONE ENCOUNTER
PA for Tyvaso sent to [facility] via [facility]    PA Case: [id number], Status: Approved, Coverage Starts on: 1/1/2025 12:00:00 AM, Coverage Ends on: 12/31/2025 12:00:00 AM

## 2025-01-03 NOTE — TELEPHONE ENCOUNTER
New Enrollment for Winrevair.    PA for Winrevair   Your request has been denied  The drug you asked for is not listed in your preferred drug list (formulary). The preferred drug(s), you may not have tried, are: Adempas tablet ambrisentan tablet Opsynvi tablet Your provider needs to give us medical reasons why the preferred drug(s) would not work for you and/or would have bad side effects.     Per Rubén with [facility]  Appeal being sent to [facility].

## 2025-01-25 DIAGNOSIS — J84.03: ICD-10-CM

## 2025-01-25 DIAGNOSIS — I27.0 PRIMARY PULMONARY HYPERTENSION (HCC): ICD-10-CM

## 2025-01-29 DIAGNOSIS — I27.0 PRIMARY PULMONARY HYPERTENSION (HCC): ICD-10-CM

## 2025-01-29 DIAGNOSIS — J84.03: ICD-10-CM

## 2025-01-29 RX ORDER — TADALAFIL 20 MG/1
20 TABLET ORAL DAILY
Qty: 30 TABLET | Refills: 11 | Status: ACTIVE | OUTPATIENT
Start: 2025-01-29

## 2025-01-31 RX ORDER — TADALAFIL 20 MG/1
20 TABLET ORAL DAILY
Qty: 30 TABLET | Refills: 11 | OUTPATIENT
Start: 2025-01-31

## 2025-02-05 ENCOUNTER — TELEPHONE (OUTPATIENT)
Dept: PULMONOLOGY | Age: 80
End: 2025-02-05

## 2025-02-05 DIAGNOSIS — I27.0 PRIMARY PULMONARY HYPERTENSION (HCC): Primary | ICD-10-CM

## 2025-02-05 DIAGNOSIS — I27.20 PULMONARY HYPERTENSION (HCC): ICD-10-CM

## 2025-02-05 NOTE — TELEPHONE ENCOUNTER
Patient called the office stating she needs an order for her lab work for the new medication Winrevair. She will need to have this drawn before her next injection and will need a standing order. She will need to have a CBC drawn every 2 weeks x's 5 weeks for baseline. New order has been placed in chart. Patient received her 1st dose today. Dr. Booth will be informed.

## 2025-02-19 DIAGNOSIS — I27.20 PULMONARY HYPERTENSION (HCC): ICD-10-CM

## 2025-02-19 DIAGNOSIS — I27.0 PRIMARY PULMONARY HYPERTENSION (HCC): ICD-10-CM

## 2025-02-19 LAB
BASOPHILS # BLD: 0.08 K/UL (ref 0–0.2)
BASOPHILS NFR BLD: 1.2 % (ref 0–2)
DIFFERENTIAL METHOD BLD: ABNORMAL
EOSINOPHIL # BLD: 0.32 K/UL (ref 0–0.8)
EOSINOPHIL NFR BLD: 4.6 % (ref 0.5–7.8)
ERYTHROCYTE [DISTWIDTH] IN BLOOD BY AUTOMATED COUNT: 14.3 % (ref 11.9–14.6)
HCT VFR BLD AUTO: 34.3 % (ref 35.8–46.3)
HGB BLD-MCNC: 9.8 G/DL (ref 11.7–15.4)
IMM GRANULOCYTES # BLD AUTO: 0.08 K/UL (ref 0–0.5)
IMM GRANULOCYTES NFR BLD AUTO: 1.2 % (ref 0–5)
LYMPHOCYTES # BLD: 3.07 K/UL (ref 0.5–4.6)
LYMPHOCYTES NFR BLD: 44.4 % (ref 13–44)
MCH RBC QN AUTO: 26.7 PG (ref 26.1–32.9)
MCHC RBC AUTO-ENTMCNC: 28.6 G/DL (ref 31.4–35)
MCV RBC AUTO: 93.5 FL (ref 82–102)
MONOCYTES # BLD: 0.56 K/UL (ref 0.1–1.3)
MONOCYTES NFR BLD: 8.1 % (ref 4–12)
NEUTS SEG # BLD: 2.8 K/UL (ref 1.7–8.2)
NEUTS SEG NFR BLD: 40.5 % (ref 43–78)
NRBC # BLD: 0.02 K/UL (ref 0–0.2)
PLATELET # BLD AUTO: 226 K/UL (ref 150–450)
PMV BLD AUTO: 10.5 FL (ref 9.4–12.3)
RBC # BLD AUTO: 3.67 M/UL (ref 4.05–5.2)
WBC # BLD AUTO: 6.9 K/UL (ref 4.3–11.1)

## 2025-03-07 ENCOUNTER — TRANSCRIBE ORDERS (OUTPATIENT)
Dept: SCHEDULING | Age: 80
End: 2025-03-07

## 2025-03-07 DIAGNOSIS — Z12.31 VISIT FOR SCREENING MAMMOGRAM: Primary | ICD-10-CM

## 2025-03-12 DIAGNOSIS — I27.0 PRIMARY PULMONARY HYPERTENSION (HCC): ICD-10-CM

## 2025-03-12 DIAGNOSIS — I27.20 PULMONARY HYPERTENSION (HCC): ICD-10-CM

## 2025-03-12 LAB
BASOPHILS # BLD: 0.04 K/UL (ref 0–0.2)
BASOPHILS NFR BLD: 0.5 % (ref 0–2)
DIFFERENTIAL METHOD BLD: ABNORMAL
EOSINOPHIL # BLD: 0.27 K/UL (ref 0–0.8)
EOSINOPHIL NFR BLD: 3.4 % (ref 0.5–7.8)
ERYTHROCYTE [DISTWIDTH] IN BLOOD BY AUTOMATED COUNT: 14.6 % (ref 11.9–14.6)
HCT VFR BLD AUTO: 35.4 % (ref 35.8–46.3)
HGB BLD-MCNC: 10.1 G/DL (ref 11.7–15.4)
IMM GRANULOCYTES # BLD AUTO: 0.21 K/UL (ref 0–0.5)
IMM GRANULOCYTES NFR BLD AUTO: 2.7 % (ref 0–5)
LYMPHOCYTES # BLD: 2.76 K/UL (ref 0.5–4.6)
LYMPHOCYTES NFR BLD: 35.1 % (ref 13–44)
MCH RBC QN AUTO: 27.4 PG (ref 26.1–32.9)
MCHC RBC AUTO-ENTMCNC: 28.5 G/DL (ref 31.4–35)
MCV RBC AUTO: 95.9 FL (ref 82–102)
MONOCYTES # BLD: 0.64 K/UL (ref 0.1–1.3)
MONOCYTES NFR BLD: 8.1 % (ref 4–12)
NEUTS SEG # BLD: 3.94 K/UL (ref 1.7–8.2)
NEUTS SEG NFR BLD: 50.2 % (ref 43–78)
NRBC # BLD: 0.05 K/UL (ref 0–0.2)
PLATELET # BLD AUTO: 230 K/UL (ref 150–450)
PMV BLD AUTO: 10.2 FL (ref 9.4–12.3)
RBC # BLD AUTO: 3.69 M/UL (ref 4.05–5.2)
WBC # BLD AUTO: 7.9 K/UL (ref 4.3–11.1)

## 2025-03-25 ASSESSMENT — ENCOUNTER SYMPTOMS
NAUSEA: 0
CONSTIPATION: 0
DIARRHEA: 0
WHEEZING: 1
ABDOMINAL PAIN: 0
SHORTNESS OF BREATH: 1
CHEST TIGHTNESS: 0
COUGH: 1
VOMITING: 0
APNEA: 0

## 2025-03-25 NOTE — PROGRESS NOTES
Dr. Shilpi Booth MD  [address]  [place] [address]  [phone]    Patient Name:  Li Mosquera  YOB: 1945  Office Visit: 3/27/2025    ASSESSMENT AND PLAN:  (Medical Decision Making)      1. Pulmonary hypertension (HCC)  Continue triple therapy with tyvaso DPI, tadalafil, sotatercept.  Continue to monitor Hgb/platelets which have been okay.    Will reassess echo and 6MWT this summer and determine if there is significant benefit to sotatercept.  - Basic Metabolic Panel; Future  - Ambulatory referral to Cardiac Testing    2. 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      3. Bronchiectasis without complication (HCC)  Will continue nebulizers and flutter valve and obtain sputum cultures.   - AFB Culture + Smear W/Rflx ID From Culture; Future  - Culture, Respiratory; Future  - Culture, Respiratory  - AFB Culture + Smear W/Rflx ID From Culture    4. Pulmonary sarcoidosis  Not active by last imaging or lab workup.      5. Hypernatremia  Plan to reduce torsemide to 10mg daily, using 20mg only if edema or weight gain is excessive.  After one week of this dose reduction, she will start spironolactone 12.5mg daily.  Check labs 2 weeks after starting spironolactone to confirm Na, renal function and K are okay.      6.  Chronic respiratory failure with hypoxia  Continue 3lpm continuously or 4-5lpm pulse dose oxygen.      Shilpi Booth MD    Total time for encounter on day of encounter was 50 minutes.  This time includes chart prep, review of tests/procedures, review of other provider's notes, documentation and counseling patient regarding disease process and medications.     _____________________________________________________________________________________________________________________  The patient (or guardian, if applicable) and other individuals in attendance with the patient were advised that Artificial Intelligence will be utilized during this visit to

## 2025-03-27 ENCOUNTER — CLINICAL DOCUMENTATION (OUTPATIENT)
Dept: PULMONOLOGY | Age: 80
End: 2025-03-27

## 2025-03-27 ENCOUNTER — OFFICE VISIT (OUTPATIENT)
Dept: PULMONOLOGY | Age: 80
End: 2025-03-27
Payer: MEDICARE

## 2025-03-27 VITALS
OXYGEN SATURATION: 95 % | WEIGHT: 168 LBS | TEMPERATURE: 97.7 F | RESPIRATION RATE: 20 BRPM | HEIGHT: 63 IN | HEART RATE: 66 BPM | BODY MASS INDEX: 29.77 KG/M2 | SYSTOLIC BLOOD PRESSURE: 136 MMHG | DIASTOLIC BLOOD PRESSURE: 76 MMHG

## 2025-03-27 DIAGNOSIS — J96.11 CHRONIC RESPIRATORY FAILURE WITH HYPOXIA: ICD-10-CM

## 2025-03-27 DIAGNOSIS — D86.0 PULMONARY SARCOIDOSIS: ICD-10-CM

## 2025-03-27 DIAGNOSIS — I27.20 PULMONARY HYPERTENSION (HCC): Primary | ICD-10-CM

## 2025-03-27 DIAGNOSIS — E87.0 HYPERNATREMIA: ICD-10-CM

## 2025-03-27 DIAGNOSIS — J47.9 BRONCHIECTASIS WITHOUT COMPLICATION (HCC): ICD-10-CM

## 2025-03-27 PROCEDURE — 1090F PRES/ABSN URINE INCON ASSESS: CPT | Performed by: INTERNAL MEDICINE

## 2025-03-27 PROCEDURE — 1036F TOBACCO NON-USER: CPT | Performed by: INTERNAL MEDICINE

## 2025-03-27 PROCEDURE — G8417 CALC BMI ABV UP PARAM F/U: HCPCS | Performed by: INTERNAL MEDICINE

## 2025-03-27 PROCEDURE — 1159F MED LIST DOCD IN RCRD: CPT | Performed by: INTERNAL MEDICINE

## 2025-03-27 PROCEDURE — 99215 OFFICE O/P EST HI 40 MIN: CPT | Performed by: INTERNAL MEDICINE

## 2025-03-27 PROCEDURE — G8399 PT W/DXA RESULTS DOCUMENT: HCPCS | Performed by: INTERNAL MEDICINE

## 2025-03-27 PROCEDURE — 1160F RVW MEDS BY RX/DR IN RCRD: CPT | Performed by: INTERNAL MEDICINE

## 2025-03-27 PROCEDURE — G8427 DOCREV CUR MEDS BY ELIG CLIN: HCPCS | Performed by: INTERNAL MEDICINE

## 2025-03-27 PROCEDURE — 1123F ACP DISCUSS/DSCN MKR DOCD: CPT | Performed by: INTERNAL MEDICINE

## 2025-03-27 RX ORDER — SPIRONOLACTONE 25 MG/1
12.5 TABLET ORAL DAILY
Qty: 45 TABLET | Refills: 1 | Status: SHIPPED | OUTPATIENT
Start: 2025-03-27

## 2025-03-27 RX ORDER — SPIRONOLACTONE 25 MG/1
12.5 TABLET ORAL DAILY
Qty: 45 TABLET | Refills: 1 | Status: SHIPPED | OUTPATIENT
Start: 2025-03-27 | End: 2025-03-27

## 2025-03-27 RX ORDER — NETARSUDIL 0.2 MG/ML
1 SOLUTION/ DROPS OPHTHALMIC; TOPICAL EVERY EVENING
COMMUNITY
Start: 2025-03-19

## 2025-03-27 NOTE — PROGRESS NOTES
Called [facility], they are not able to set up Sotatercept on autoship.  Patient should continue to call the week prior to need.

## 2025-03-27 NOTE — PATIENT INSTRUCTIONS
Continue winrevair.  Labs look good.  It looks like you will have to call for each shipment unfortunately.  We will need a 6 minute walk test and an echo this summer to try to determine if the winrevair is helping.  Please bring the box so we can double check the dose.  For your productive cough, we will check sputum cultures.  See below for instructions on sputum submission to the lab.  Continue the 3% saline and albuterol nebs.  Consider using the nebulizers twice a day and also adding in the flutter valve.  For your diuretic regimen, we plan to keep working to figure out what works best for your swelling and yet doesn't dehydrate you too much.  Today I recommend the following:  Decrease torsemide to 10mg daily.  If your swelling or weight are worsening, ok to take an extra dose of 10mg.  In one week, add spironolactone 12.5mg daily (roughly April 3rd)  Check a BMP blood test in 3-4 weeks to confirm sodium, potassium and kidney function are still doing alright.  This blood work would be needed sometime between April 19-24th.

## 2025-04-04 DIAGNOSIS — I27.0 PRIMARY PULMONARY HYPERTENSION (HCC): ICD-10-CM

## 2025-04-04 DIAGNOSIS — I27.20 PULMONARY HYPERTENSION (HCC): ICD-10-CM

## 2025-04-04 LAB
DIFFERENTIAL METHOD BLD: ABNORMAL
ERYTHROCYTE [DISTWIDTH] IN BLOOD BY AUTOMATED COUNT: 15.2 % (ref 11.9–14.6)
HCT VFR BLD AUTO: 35.6 % (ref 35.8–46.3)
HGB BLD-MCNC: 10.4 G/DL (ref 11.7–15.4)
LYMPHOCYTES # BLD: 4.28 K/UL (ref 0.5–4.6)
LYMPHOCYTES NFR BLD MANUAL: 45 % (ref 16–44)
MCH RBC QN AUTO: 27.3 PG (ref 26.1–32.9)
MCHC RBC AUTO-ENTMCNC: 29.2 G/DL (ref 31.4–35)
MCV RBC AUTO: 93.4 FL (ref 82–102)
METAMYELOCYTES NFR BLD MANUAL: 4 %
MONOCYTES # BLD: 0.86 K/UL (ref 0.1–1.3)
MONOCYTES NFR BLD MANUAL: 9 % (ref 3–9)
NEUTS SEG # BLD: 4.36 K/UL (ref 1.7–8.2)
NEUTS SEG NFR BLD MANUAL: 42 % (ref 47–75)
NRBC # BLD: 0.55 K/UL (ref 0–0.2)
NRBC BLD-RTO: 4 PER 100 WBC
PLATELET # BLD AUTO: 208 K/UL (ref 150–450)
PLATELET COMMENT: ADEQUATE
PMV BLD AUTO: 10.4 FL (ref 9.4–12.3)
RBC # BLD AUTO: 3.81 M/UL (ref 4.05–5.2)
RBC MORPH BLD: ABNORMAL
RBC MORPH BLD: ABNORMAL
WBC # BLD AUTO: 9.5 K/UL (ref 4.3–11.1)

## 2025-04-05 ENCOUNTER — HOSPITAL ENCOUNTER (OUTPATIENT)
Dept: MAMMOGRAPHY | Age: 80
End: 2025-04-05
Payer: MEDICARE

## 2025-04-05 DIAGNOSIS — Z12.31 VISIT FOR SCREENING MAMMOGRAM: ICD-10-CM

## 2025-04-05 PROCEDURE — 77067 SCR MAMMO BI INCL CAD: CPT

## 2025-04-16 LAB
BACTERIA SPEC CULT: NORMAL
GRAM STN SPEC: NORMAL
SERVICE CMNT-IMP: NORMAL

## 2025-04-17 LAB
ACID FAST STN SPEC: NEGATIVE
SPECIMEN PREPARATION: NORMAL
SPECIMEN SOURCE: NORMAL

## 2025-04-25 ENCOUNTER — TELEPHONE (OUTPATIENT)
Dept: PULMONOLOGY | Age: 80
End: 2025-04-25

## 2025-04-25 DIAGNOSIS — R06.00 DYSPNEA, UNSPECIFIED TYPE: Primary | ICD-10-CM

## 2025-04-25 DIAGNOSIS — E87.0 HYPERNATREMIA: ICD-10-CM

## 2025-04-25 DIAGNOSIS — I27.20 PULMONARY HYPERTENSION (HCC): ICD-10-CM

## 2025-04-25 DIAGNOSIS — I27.0 PRIMARY PULMONARY HYPERTENSION (HCC): ICD-10-CM

## 2025-04-25 LAB
ANION GAP SERPL CALC-SCNC: 11 MMOL/L (ref 7–16)
BASOPHILS # BLD: 0.03 K/UL (ref 0–0.2)
BASOPHILS NFR BLD: 0.4 % (ref 0–2)
BUN SERPL-MCNC: 16 MG/DL (ref 8–23)
CALCIUM SERPL-MCNC: 9.3 MG/DL (ref 8.8–10.2)
CHLORIDE SERPL-SCNC: 97 MMOL/L (ref 98–107)
CO2 SERPL-SCNC: 40 MMOL/L (ref 20–29)
CREAT SERPL-MCNC: 0.89 MG/DL (ref 0.6–1.1)
DIFFERENTIAL METHOD BLD: ABNORMAL
EOSINOPHIL # BLD: 0.18 K/UL (ref 0–0.8)
EOSINOPHIL NFR BLD: 2.2 % (ref 0.5–7.8)
ERYTHROCYTE [DISTWIDTH] IN BLOOD BY AUTOMATED COUNT: 16.2 % (ref 11.9–14.6)
GLUCOSE SERPL-MCNC: 206 MG/DL (ref 70–99)
HCT VFR BLD AUTO: 35.6 % (ref 35.8–46.3)
HGB BLD-MCNC: 10.3 G/DL (ref 11.7–15.4)
IMM GRANULOCYTES # BLD AUTO: 1.1 K/UL (ref 0–0.5)
IMM GRANULOCYTES NFR BLD AUTO: 13.3 % (ref 0–5)
LYMPHOCYTES # BLD: 2.47 K/UL (ref 0.5–4.6)
LYMPHOCYTES NFR BLD: 29.8 % (ref 13–44)
MCH RBC QN AUTO: 27.2 PG (ref 26.1–32.9)
MCHC RBC AUTO-ENTMCNC: 28.9 G/DL (ref 31.4–35)
MCV RBC AUTO: 94.2 FL (ref 82–102)
MONOCYTES # BLD: 0.57 K/UL (ref 0.1–1.3)
MONOCYTES NFR BLD: 6.9 % (ref 4–12)
NEUTS SEG # BLD: 3.95 K/UL (ref 1.7–8.2)
NEUTS SEG NFR BLD: 47.4 % (ref 43–78)
NRBC # BLD: 0.29 K/UL (ref 0–0.2)
PLATELET # BLD AUTO: 214 K/UL (ref 150–450)
PLATELET COMMENT: ADEQUATE
PMV BLD AUTO: 10.2 FL (ref 9.4–12.3)
POTASSIUM SERPL-SCNC: 3.8 MMOL/L (ref 3.5–5.1)
RBC # BLD AUTO: 3.78 M/UL (ref 4.05–5.2)
RBC MORPH BLD: ABNORMAL
RBC MORPH BLD: ABNORMAL
SODIUM SERPL-SCNC: 147 MMOL/L (ref 136–145)
WBC # BLD AUTO: 8.3 K/UL (ref 4.3–11.1)
WBC MORPH BLD: ABNORMAL

## 2025-04-25 NOTE — TELEPHONE ENCOUNTER
Patient left message on voicemail requesting call back due to new medication Dr. Booth started her on. She is having swollen ankles and medication is just not working.    Left message for patient to call back.

## 2025-04-25 NOTE — TELEPHONE ENCOUNTER
Spoke to patient and reports since she started on spironolactone 12.5mg daily and torsemide 10mg daily is having increased swelling in legs and ankles for last 2 weeks. Since increased swelling having harder time walking, increased shortness of breath while walking, and oxygen levels drops to 87-89% while walking on 4lpm 02. She states shortness of breath and oxygen levels are fine while sitting. Her weight is down from 168lb to 161lb. Wants to know what Dr. Booth recommends on getting swelling down.   Had labs drawn today.    Per direct conversation with Dr. Booth increase Torsemide to 20mg daily and spironolactone 25mg daily. Repeat BMP in two weeks. If gets a little more thirsty since increasing dosage of medication can drink a little more but no more than 2L per day and keep legs elevated.    Patient notified of new instructions and voices understanding. Order placed for BMP for 2 weeks.

## 2025-05-09 DIAGNOSIS — R06.00 DYSPNEA, UNSPECIFIED TYPE: ICD-10-CM

## 2025-05-09 DIAGNOSIS — I27.20 PULMONARY HYPERTENSION (HCC): ICD-10-CM

## 2025-05-09 DIAGNOSIS — I27.0 PRIMARY PULMONARY HYPERTENSION (HCC): ICD-10-CM

## 2025-05-09 LAB
ANION GAP SERPL CALC-SCNC: 7 MMOL/L (ref 7–16)
BASOPHILS # BLD: 0.1 K/UL (ref 0–0.2)
BASOPHILS NFR BLD: 1.2 % (ref 0–2)
BUN SERPL-MCNC: 14 MG/DL (ref 8–23)
CALCIUM SERPL-MCNC: 9.3 MG/DL (ref 8.8–10.2)
CHLORIDE SERPL-SCNC: 95 MMOL/L (ref 98–107)
CO2 SERPL-SCNC: 41 MMOL/L (ref 20–29)
CREAT SERPL-MCNC: 0.78 MG/DL (ref 0.6–1.1)
DIFFERENTIAL METHOD BLD: ABNORMAL
EOSINOPHIL # BLD: 0.13 K/UL (ref 0–0.8)
EOSINOPHIL NFR BLD: 1.5 % (ref 0.5–7.8)
ERYTHROCYTE [DISTWIDTH] IN BLOOD BY AUTOMATED COUNT: 16.7 % (ref 11.9–14.6)
GLUCOSE SERPL-MCNC: 186 MG/DL (ref 70–99)
HCT VFR BLD AUTO: 31.9 % (ref 35.8–46.3)
HGB BLD-MCNC: 9.5 G/DL (ref 11.7–15.4)
IMM GRANULOCYTES # BLD AUTO: 0.76 K/UL (ref 0–0.5)
IMM GRANULOCYTES NFR BLD AUTO: 8.8 % (ref 0–5)
LYMPHOCYTES # BLD: 2.77 K/UL (ref 0.5–4.6)
LYMPHOCYTES NFR BLD: 32.2 % (ref 13–44)
MCH RBC QN AUTO: 27.9 PG (ref 26.1–32.9)
MCHC RBC AUTO-ENTMCNC: 29.8 G/DL (ref 31.4–35)
MCV RBC AUTO: 93.5 FL (ref 82–102)
MONOCYTES # BLD: 0.58 K/UL (ref 0.1–1.3)
MONOCYTES NFR BLD: 6.8 % (ref 4–12)
NEUTS SEG # BLD: 4.26 K/UL (ref 1.7–8.2)
NEUTS SEG NFR BLD: 49.5 % (ref 43–78)
NRBC # BLD: 0.59 K/UL (ref 0–0.2)
PLATELET # BLD AUTO: 145 K/UL (ref 150–450)
PLATELET COMMENT: SLIGHT
PMV BLD AUTO: 10.7 FL (ref 9.4–12.3)
POTASSIUM SERPL-SCNC: 3.9 MMOL/L (ref 3.5–5.1)
RBC # BLD AUTO: 3.41 M/UL (ref 4.05–5.2)
RBC MORPH BLD: ABNORMAL
RBC MORPH BLD: ABNORMAL
SODIUM SERPL-SCNC: 143 MMOL/L (ref 136–145)
WBC # BLD AUTO: 8.6 K/UL (ref 4.3–11.1)
WBC MORPH BLD: ABNORMAL

## 2025-05-16 DIAGNOSIS — I27.0 PRIMARY PULMONARY HYPERTENSION (HCC): ICD-10-CM

## 2025-05-16 DIAGNOSIS — I27.20 PULMONARY HYPERTENSION (HCC): ICD-10-CM

## 2025-05-16 LAB
BASOPHILS # BLD: 0.07 K/UL (ref 0–0.2)
BASOPHILS NFR BLD: 0.9 % (ref 0–2)
DIFFERENTIAL METHOD BLD: ABNORMAL
EOSINOPHIL # BLD: 0.24 K/UL (ref 0–0.8)
EOSINOPHIL NFR BLD: 3.1 % (ref 0.5–7.8)
ERYTHROCYTE [DISTWIDTH] IN BLOOD BY AUTOMATED COUNT: 16.8 % (ref 11.9–14.6)
HCT VFR BLD AUTO: 30.4 % (ref 35.8–46.3)
HGB BLD-MCNC: 9.3 G/DL (ref 11.7–15.4)
IMM GRANULOCYTES # BLD AUTO: 0.94 K/UL (ref 0–0.5)
IMM GRANULOCYTES NFR BLD AUTO: 12 % (ref 0–5)
LYMPHOCYTES # BLD: 2.74 K/UL (ref 0.5–4.6)
LYMPHOCYTES NFR BLD: 35.1 % (ref 13–44)
MCH RBC QN AUTO: 28.4 PG (ref 26.1–32.9)
MCHC RBC AUTO-ENTMCNC: 30.6 G/DL (ref 31.4–35)
MCV RBC AUTO: 92.7 FL (ref 82–102)
MONOCYTES # BLD: 0.66 K/UL (ref 0.1–1.3)
MONOCYTES NFR BLD: 8.5 % (ref 4–12)
NEUTS SEG # BLD: 3.15 K/UL (ref 1.7–8.2)
NEUTS SEG NFR BLD: 40.4 % (ref 43–78)
NRBC # BLD: 0.71 K/UL (ref 0–0.2)
PLATELET # BLD AUTO: 186 K/UL (ref 150–450)
PLATELET COMMENT: ADEQUATE
PMV BLD AUTO: 10.7 FL (ref 9.4–12.3)
RBC # BLD AUTO: 3.28 M/UL (ref 4.05–5.2)
RBC MORPH BLD: ABNORMAL
RBC MORPH BLD: ABNORMAL
WBC # BLD AUTO: 7.8 K/UL (ref 4.3–11.1)
WBC MORPH BLD: ABNORMAL

## 2025-06-11 DIAGNOSIS — R06.00 DYSPNEA, UNSPECIFIED TYPE: Primary | ICD-10-CM

## 2025-06-11 NOTE — TELEPHONE ENCOUNTER
Per direct review with Dr. Booth, increase Torsemide to 20mg daily and stay on spironolactone 25mg daily and repeat BMP in 1 week.     Patient notified and voices understanding. She states she needs Rx for spironolactone sent to pharmacy since the increase in dose.    Rx placed and routed to Dr. Booth to sign.

## 2025-06-11 NOTE — TELEPHONE ENCOUNTER
Patient left message on voicemail needing a call back about spironolactone.    Spoke to patient and she states she needs new rx for spironolactone and needs to know what dose she needs to be on. She states her knees, lower legs, ankles and feet are still swollen. She is currently taking torsemide 10mg, spironolactone 25mg, tadalafil 20mg daily, Sotatercept every 21 days, Tyvaso 64mcg 4x/day. She states breathing is fine and 02 levels have been staying above 90% on 4lpm 02. Current weight 136.2. lbs    She was supposed to increase torsemide to 20mg daily and spironolactone to 25mg daily on 4/25/205 and repeat blood work in 2 weeks. She never increased the torsemide only spironolactone.    Wants to know Dr. Booth recommendations and wants to know what dosage of Torsemide and spironolactone needs to stay on.

## 2025-06-12 RX ORDER — SPIRONOLACTONE 25 MG/1
25 TABLET ORAL DAILY
Qty: 90 TABLET | Refills: 1 | Status: SHIPPED | OUTPATIENT
Start: 2025-06-12

## 2025-06-24 DIAGNOSIS — I27.20 PULMONARY HYPERTENSION (HCC): ICD-10-CM

## 2025-06-24 DIAGNOSIS — R06.00 DYSPNEA, UNSPECIFIED TYPE: ICD-10-CM

## 2025-06-24 DIAGNOSIS — I27.0 PRIMARY PULMONARY HYPERTENSION (HCC): ICD-10-CM

## 2025-06-24 LAB
ANION GAP SERPL CALC-SCNC: 9 MMOL/L (ref 7–16)
BASOPHILS # BLD: 0.05 K/UL (ref 0–0.2)
BASOPHILS NFR BLD: 0.7 % (ref 0–2)
BUN SERPL-MCNC: 20 MG/DL (ref 8–23)
CALCIUM SERPL-MCNC: 10.2 MG/DL (ref 8.8–10.2)
CHLORIDE SERPL-SCNC: 87 MMOL/L (ref 98–107)
CO2 SERPL-SCNC: 44 MMOL/L (ref 20–29)
CREAT SERPL-MCNC: 0.86 MG/DL (ref 0.6–1.1)
DIFFERENTIAL METHOD BLD: ABNORMAL
EOSINOPHIL # BLD: 0.23 K/UL (ref 0–0.8)
EOSINOPHIL NFR BLD: 3.2 % (ref 0.5–7.8)
ERYTHROCYTE [DISTWIDTH] IN BLOOD BY AUTOMATED COUNT: 17.5 % (ref 11.9–14.6)
GLUCOSE SERPL-MCNC: 229 MG/DL (ref 70–99)
HCT VFR BLD AUTO: 33.6 % (ref 35.8–46.3)
HGB BLD-MCNC: 9.5 G/DL (ref 11.7–15.4)
IMM GRANULOCYTES # BLD AUTO: 0.37 K/UL (ref 0–0.5)
IMM GRANULOCYTES NFR BLD AUTO: 5.1 % (ref 0–5)
LYMPHOCYTES # BLD: 2.66 K/UL (ref 0.5–4.6)
LYMPHOCYTES NFR BLD: 36.5 % (ref 13–44)
MCH RBC QN AUTO: 28.7 PG (ref 26.1–32.9)
MCHC RBC AUTO-ENTMCNC: 28.3 G/DL (ref 31.4–35)
MCV RBC AUTO: 101.5 FL (ref 82–102)
MONOCYTES # BLD: 0.59 K/UL (ref 0.1–1.3)
MONOCYTES NFR BLD: 8.1 % (ref 4–12)
NEUTS SEG # BLD: 3.39 K/UL (ref 1.7–8.2)
NEUTS SEG NFR BLD: 46.4 % (ref 43–78)
NRBC # BLD: 0.21 K/UL (ref 0–0.2)
PLATELET # BLD AUTO: 180 K/UL (ref 150–450)
PLATELET COMMENT: ADEQUATE
PMV BLD AUTO: 11.3 FL (ref 9.4–12.3)
POTASSIUM SERPL-SCNC: 4.3 MMOL/L (ref 3.5–5.1)
RBC # BLD AUTO: 3.31 M/UL (ref 4.05–5.2)
RBC MORPH BLD: ABNORMAL
RBC MORPH BLD: ABNORMAL
SODIUM SERPL-SCNC: 140 MMOL/L (ref 136–145)
WBC # BLD AUTO: 7.3 K/UL (ref 4.3–11.1)
WBC MORPH BLD: ABNORMAL

## 2025-07-14 RX ORDER — SPIRONOLACTONE 25 MG/1
TABLET ORAL
Qty: 45 TABLET | Refills: 0 | OUTPATIENT
Start: 2025-07-14

## 2025-07-15 DIAGNOSIS — I27.20 PULMONARY HYPERTENSION (HCC): ICD-10-CM

## 2025-07-15 DIAGNOSIS — I27.0 PRIMARY PULMONARY HYPERTENSION (HCC): ICD-10-CM

## 2025-07-15 LAB
BASOPHILS # BLD: 0.04 K/UL (ref 0–0.2)
BASOPHILS NFR BLD: 0.6 % (ref 0–2)
DIFFERENTIAL METHOD BLD: ABNORMAL
EOSINOPHIL # BLD: 0.19 K/UL (ref 0–0.8)
EOSINOPHIL NFR BLD: 2.7 % (ref 0.5–7.8)
ERYTHROCYTE [DISTWIDTH] IN BLOOD BY AUTOMATED COUNT: 17.2 % (ref 11.9–14.6)
HCT VFR BLD AUTO: 35.1 % (ref 35.8–46.3)
HGB BLD-MCNC: 10 G/DL (ref 11.7–15.4)
IMM GRANULOCYTES # BLD AUTO: 0.46 K/UL (ref 0–0.5)
IMM GRANULOCYTES NFR BLD AUTO: 6.5 % (ref 0–5)
LYMPHOCYTES # BLD: 2.93 K/UL (ref 0.5–4.6)
LYMPHOCYTES NFR BLD: 41.3 % (ref 13–44)
MCH RBC QN AUTO: 29.2 PG (ref 26.1–32.9)
MCHC RBC AUTO-ENTMCNC: 28.5 G/DL (ref 31.4–35)
MCV RBC AUTO: 102.3 FL (ref 82–102)
MONOCYTES # BLD: 0.58 K/UL (ref 0.1–1.3)
MONOCYTES NFR BLD: 8.2 % (ref 4–12)
NEUTS SEG # BLD: 2.89 K/UL (ref 1.7–8.2)
NEUTS SEG NFR BLD: 40.7 % (ref 43–78)
NRBC # BLD: 0.33 K/UL (ref 0–0.2)
PLATELET # BLD AUTO: 230 K/UL (ref 150–450)
PLATELET COMMENT: ABNORMAL
PMV BLD AUTO: 10.7 FL (ref 9.4–12.3)
RBC # BLD AUTO: 3.43 M/UL (ref 4.05–5.2)
RBC MORPH BLD: ABNORMAL
WBC # BLD AUTO: 7.1 K/UL (ref 4.3–11.1)
WBC MORPH BLD: ABNORMAL

## 2025-08-05 DIAGNOSIS — I27.20 PULMONARY HYPERTENSION (HCC): ICD-10-CM

## 2025-08-05 DIAGNOSIS — I27.0 PRIMARY PULMONARY HYPERTENSION (HCC): ICD-10-CM

## 2025-08-05 LAB
BASOPHILS # BLD: 0.09 K/UL (ref 0–0.2)
BASOPHILS NFR BLD: 0.9 % (ref 0–2)
DIFFERENTIAL METHOD BLD: ABNORMAL
EOSINOPHIL # BLD: 0.13 K/UL (ref 0–0.8)
EOSINOPHIL NFR BLD: 1.3 % (ref 0.5–7.8)
ERYTHROCYTE [DISTWIDTH] IN BLOOD BY AUTOMATED COUNT: 17.2 % (ref 11.9–14.6)
HCT VFR BLD AUTO: 33.1 % (ref 35.8–46.3)
HGB BLD-MCNC: 9.7 G/DL (ref 11.7–15.4)
IMM GRANULOCYTES # BLD AUTO: 0.89 K/UL (ref 0–0.5)
IMM GRANULOCYTES NFR BLD AUTO: 9.1 % (ref 0–5)
LYMPHOCYTES # BLD: 2.47 K/UL (ref 0.5–4.6)
LYMPHOCYTES NFR BLD: 25.2 % (ref 13–44)
MCH RBC QN AUTO: 31.1 PG (ref 26.1–32.9)
MCHC RBC AUTO-ENTMCNC: 29.3 G/DL (ref 31.4–35)
MCV RBC AUTO: 106.1 FL (ref 82–102)
MONOCYTES # BLD: 0.76 K/UL (ref 0.1–1.3)
MONOCYTES NFR BLD: 7.8 % (ref 4–12)
NEUTS SEG # BLD: 5.46 K/UL (ref 1.7–8.2)
NEUTS SEG NFR BLD: 55.7 % (ref 43–78)
NRBC # BLD: 0.73 K/UL (ref 0–0.2)
PLATELET # BLD AUTO: 187 K/UL (ref 150–450)
PLATELET COMMENT: ADEQUATE
PMV BLD AUTO: 9.6 FL (ref 9.4–12.3)
RBC # BLD AUTO: 3.12 M/UL (ref 4.05–5.2)
RBC MORPH BLD: ABNORMAL
RBC MORPH BLD: ABNORMAL
WBC # BLD AUTO: 9.8 K/UL (ref 4.3–11.1)
WBC MORPH BLD: ABNORMAL

## 2025-08-05 RX ORDER — TREPROSTINIL 64 UG/1
INHALANT ORAL
Qty: 112 EACH | Refills: 5 | Status: ACTIVE | OUTPATIENT
Start: 2025-08-05

## 2025-08-07 ENCOUNTER — TELEPHONE (OUTPATIENT)
Dept: PULMONOLOGY | Age: 80
End: 2025-08-07

## 2025-08-07 DIAGNOSIS — I27.20 PULMONARY HYPERTENSION (HCC): Primary | ICD-10-CM

## 2025-08-07 DIAGNOSIS — R06.00 DYSPNEA, UNSPECIFIED TYPE: Primary | ICD-10-CM

## 2025-08-07 RX ORDER — SPIRONOLACTONE 25 MG/1
25 TABLET ORAL DAILY
Qty: 90 TABLET | Refills: 1 | Status: SHIPPED | OUTPATIENT
Start: 2025-08-07

## 2025-08-08 DIAGNOSIS — I27.20 PULMONARY HYPERTENSION (HCC): ICD-10-CM

## 2025-08-13 RX ORDER — SPIRONOLACTONE 25 MG/1
25 TABLET ORAL DAILY
Qty: 90 TABLET | Refills: 3 | Status: SHIPPED | OUTPATIENT
Start: 2025-08-13

## 2025-08-14 ENCOUNTER — RESULTS FOLLOW-UP (OUTPATIENT)
Dept: PULMONOLOGY | Age: 80
End: 2025-08-14

## 2025-08-18 DIAGNOSIS — R06.00 DYSPNEA, UNSPECIFIED TYPE: ICD-10-CM

## 2025-08-18 LAB
ALBUMIN SERPL-MCNC: 3 G/DL (ref 3.2–4.6)
ALBUMIN/GLOB SERPL: 0.7 (ref 1–1.9)
ALP SERPL-CCNC: 75 U/L (ref 35–104)
ALT SERPL-CCNC: 12 U/L (ref 8–45)
ANION GAP SERPL CALC-SCNC: 10 MMOL/L (ref 7–16)
AST SERPL-CCNC: 25 U/L (ref 15–37)
BASOPHILS # BLD: 0.08 K/UL (ref 0–0.2)
BASOPHILS NFR BLD: 1 % (ref 0–2)
BILIRUB SERPL-MCNC: 0.3 MG/DL (ref 0–1.2)
BUN SERPL-MCNC: 14 MG/DL (ref 8–23)
CALCIUM SERPL-MCNC: 9.8 MG/DL (ref 8.8–10.2)
CHLORIDE SERPL-SCNC: 95 MMOL/L (ref 98–107)
CO2 SERPL-SCNC: 39 MMOL/L (ref 20–29)
CREAT SERPL-MCNC: 0.76 MG/DL (ref 0.6–1.1)
DIFFERENTIAL METHOD BLD: ABNORMAL
EOSINOPHIL # BLD: 0.17 K/UL (ref 0–0.8)
EOSINOPHIL NFR BLD: 2.1 % (ref 0.5–7.8)
ERYTHROCYTE [DISTWIDTH] IN BLOOD BY AUTOMATED COUNT: 16.9 % (ref 11.9–14.6)
GLOBULIN SER CALC-MCNC: 4.4 G/DL (ref 2.3–3.5)
GLUCOSE SERPL-MCNC: 184 MG/DL (ref 70–99)
HCT VFR BLD AUTO: 32 % (ref 35.8–46.3)
HGB BLD-MCNC: 9.3 G/DL (ref 11.7–15.4)
IMM GRANULOCYTES # BLD AUTO: 1.09 K/UL (ref 0–0.5)
IMM GRANULOCYTES NFR BLD AUTO: 13.3 % (ref 0–5)
LYMPHOCYTES # BLD: 2.63 K/UL (ref 0.5–4.6)
LYMPHOCYTES NFR BLD: 32.1 % (ref 13–44)
MCH RBC QN AUTO: 30.9 PG (ref 26.1–32.9)
MCHC RBC AUTO-ENTMCNC: 29.1 G/DL (ref 31.4–35)
MCV RBC AUTO: 106.3 FL (ref 82–102)
MONOCYTES # BLD: 0.66 K/UL (ref 0.1–1.3)
MONOCYTES NFR BLD: 8 % (ref 4–12)
NEUTS SEG # BLD: 3.57 K/UL (ref 1.7–8.2)
NEUTS SEG NFR BLD: 43.5 % (ref 43–78)
NRBC # BLD: 0.52 K/UL (ref 0–0.2)
NT PRO BNP: 117 PG/ML (ref 0–450)
PLATELET # BLD AUTO: 202 K/UL (ref 150–450)
PLATELET COMMENT: ADEQUATE
PMV BLD AUTO: 10.1 FL (ref 9.4–12.3)
POTASSIUM SERPL-SCNC: 3.8 MMOL/L (ref 3.5–5.1)
PROT SERPL-MCNC: 7.4 G/DL (ref 6.3–8.2)
RBC # BLD AUTO: 3.01 M/UL (ref 4.05–5.2)
RBC MORPH BLD: ABNORMAL
RBC MORPH BLD: ABNORMAL
SODIUM SERPL-SCNC: 143 MMOL/L (ref 136–145)
WBC # BLD AUTO: 8.2 K/UL (ref 4.3–11.1)
WBC MORPH BLD: ABNORMAL

## 2025-08-19 ENCOUNTER — OFFICE VISIT (OUTPATIENT)
Dept: PULMONOLOGY | Age: 80
End: 2025-08-19

## 2025-08-19 ENCOUNTER — CLINICAL SUPPORT (OUTPATIENT)
Dept: PULMONOLOGY | Age: 80
End: 2025-08-19
Payer: MEDICARE

## 2025-08-19 VITALS
DIASTOLIC BLOOD PRESSURE: 67 MMHG | TEMPERATURE: 97.2 F | SYSTOLIC BLOOD PRESSURE: 116 MMHG | BODY MASS INDEX: 30.91 KG/M2 | RESPIRATION RATE: 18 BRPM | HEIGHT: 62 IN | OXYGEN SATURATION: 93 % | WEIGHT: 168 LBS | HEART RATE: 89 BPM

## 2025-08-19 VITALS
TEMPERATURE: 97.2 F | OXYGEN SATURATION: 93 % | HEART RATE: 80 BPM | WEIGHT: 168 LBS | SYSTOLIC BLOOD PRESSURE: 116 MMHG | DIASTOLIC BLOOD PRESSURE: 64 MMHG | BODY MASS INDEX: 30.91 KG/M2 | HEIGHT: 62 IN

## 2025-08-19 DIAGNOSIS — R06.00 DYSPNEA, UNSPECIFIED TYPE: ICD-10-CM

## 2025-08-19 DIAGNOSIS — I27.20 PULMONARY HYPERTENSION (HCC): Primary | ICD-10-CM

## 2025-08-19 DIAGNOSIS — J96.11 CHRONIC RESPIRATORY FAILURE WITH HYPOXIA (HCC): ICD-10-CM

## 2025-08-19 DIAGNOSIS — R06.02 SHORTNESS OF BREATH: ICD-10-CM

## 2025-08-19 DIAGNOSIS — J47.9 BRONCHIECTASIS WITHOUT COMPLICATION (HCC): ICD-10-CM

## 2025-08-19 DIAGNOSIS — D86.0 PULMONARY SARCOIDOSIS: ICD-10-CM

## 2025-08-19 PROCEDURE — 94618 PULMONARY STRESS TESTING: CPT | Performed by: STUDENT IN AN ORGANIZED HEALTH CARE EDUCATION/TRAINING PROGRAM

## 2025-08-19 RX ORDER — TORSEMIDE 20 MG/1
20 TABLET ORAL DAILY
Qty: 90 TABLET | Refills: 3 | Status: SHIPPED | OUTPATIENT
Start: 2025-08-19

## 2025-08-19 RX ORDER — TORSEMIDE 20 MG/1
20 TABLET ORAL DAILY
Qty: 30 TABLET | Refills: 11 | Status: SHIPPED | OUTPATIENT
Start: 2025-08-19 | End: 2025-08-19

## 2025-08-26 DIAGNOSIS — I27.20 PULMONARY HYPERTENSION (HCC): ICD-10-CM

## 2025-08-26 DIAGNOSIS — I27.0 PRIMARY PULMONARY HYPERTENSION (HCC): ICD-10-CM

## 2025-08-26 LAB
BASOPHILS # BLD: 0.07 K/UL (ref 0–0.2)
BASOPHILS NFR BLD: 0.8 % (ref 0–2)
DIFFERENTIAL METHOD BLD: ABNORMAL
EOSINOPHIL # BLD: 0.2 K/UL (ref 0–0.8)
EOSINOPHIL NFR BLD: 2.2 % (ref 0.5–7.8)
ERYTHROCYTE [DISTWIDTH] IN BLOOD BY AUTOMATED COUNT: 17.2 % (ref 11.9–14.6)
HCT VFR BLD AUTO: 30.7 % (ref 35.8–46.3)
HGB BLD-MCNC: 8.9 G/DL (ref 11.7–15.4)
IMM GRANULOCYTES # BLD AUTO: 0.75 K/UL (ref 0–0.5)
IMM GRANULOCYTES NFR BLD AUTO: 8.3 % (ref 0–5)
LYMPHOCYTES # BLD: 2.66 K/UL (ref 0.5–4.6)
LYMPHOCYTES NFR BLD: 29.5 % (ref 13–44)
MCH RBC QN AUTO: 30.6 PG (ref 26.1–32.9)
MCHC RBC AUTO-ENTMCNC: 29 G/DL (ref 31.4–35)
MCV RBC AUTO: 105.5 FL (ref 82–102)
MONOCYTES # BLD: 0.66 K/UL (ref 0.1–1.3)
MONOCYTES NFR BLD: 7.3 % (ref 4–12)
NEUTS SEG # BLD: 4.67 K/UL (ref 1.7–8.2)
NEUTS SEG NFR BLD: 51.9 % (ref 43–78)
NRBC # BLD: 0.96 K/UL (ref 0–0.2)
PLATELET # BLD AUTO: 158 K/UL (ref 150–450)
PLATELET COMMENT: ADEQUATE
PMV BLD AUTO: 10.1 FL (ref 9.4–12.3)
RBC # BLD AUTO: 2.91 M/UL (ref 4.05–5.2)
RBC MORPH BLD: ABNORMAL
WBC # BLD AUTO: 9 K/UL (ref 4.3–11.1)
WBC MORPH BLD: ABNORMAL

## 2025-08-29 ENCOUNTER — HOSPITAL ENCOUNTER (OUTPATIENT)
Dept: CT IMAGING | Age: 80
Discharge: HOME | End: 2025-09-01
Payer: MEDICARE

## 2025-08-29 DIAGNOSIS — D86.0 PULMONARY SARCOIDOSIS: ICD-10-CM

## 2025-08-29 DIAGNOSIS — R06.02 SHORTNESS OF BREATH: ICD-10-CM

## 2025-08-29 PROCEDURE — 71260 CT THORAX DX C+: CPT

## 2025-08-29 PROCEDURE — 6360000004 HC RX CONTRAST MEDICATION: Performed by: STUDENT IN AN ORGANIZED HEALTH CARE EDUCATION/TRAINING PROGRAM

## 2025-08-29 RX ORDER — IOPAMIDOL 755 MG/ML
100 INJECTION, SOLUTION INTRAVASCULAR
Status: COMPLETED | OUTPATIENT
Start: 2025-08-29 | End: 2025-08-29

## 2025-08-29 RX ADMIN — IOPAMIDOL 100 ML: 755 INJECTION, SOLUTION INTRAVENOUS at 08:53

## (undated) DEVICE — SINGLE USE BIOPSY VALVE MAJ-210: Brand: SINGLE USE BIOPSY VALVE (STERILE)

## (undated) DEVICE — BRONCHOSCOPY PACK: Brand: MEDLINE INDUSTRIES, INC.

## (undated) DEVICE — SYR 50ML SLIP TIP NSAF LF STRL --

## (undated) DEVICE — KENDALL RADIOLUCENT FOAM MONITORING ELECTRODE RECTANGULAR SHAPE: Brand: KENDALL

## (undated) DEVICE — MOUTHPIECE ENDOSCP 20X27MM --

## (undated) DEVICE — SINGLE USE SUCTION VALVE MAJ-209: Brand: SINGLE USE SUCTION VALVE (STERILE)